# Patient Record
Sex: FEMALE | Race: WHITE | NOT HISPANIC OR LATINO | Employment: UNEMPLOYED | URBAN - METROPOLITAN AREA
[De-identification: names, ages, dates, MRNs, and addresses within clinical notes are randomized per-mention and may not be internally consistent; named-entity substitution may affect disease eponyms.]

---

## 2021-01-01 ENCOUNTER — APPOINTMENT (EMERGENCY)
Dept: RADIOLOGY | Facility: HOSPITAL | Age: 31
End: 2021-01-01
Payer: MEDICARE

## 2021-01-01 ENCOUNTER — HOSPITAL ENCOUNTER (EMERGENCY)
Facility: HOSPITAL | Age: 31
Discharge: HOME | End: 2021-01-01
Attending: EMERGENCY MEDICINE | Admitting: EMERGENCY MEDICINE
Payer: MEDICARE

## 2021-01-01 VITALS
DIASTOLIC BLOOD PRESSURE: 56 MMHG | RESPIRATION RATE: 28 BRPM | OXYGEN SATURATION: 96 % | TEMPERATURE: 98 F | SYSTOLIC BLOOD PRESSURE: 115 MMHG | WEIGHT: 251.32 LBS | HEART RATE: 83 BPM

## 2021-01-01 DIAGNOSIS — R07.9 CHEST PAIN: Primary | ICD-10-CM

## 2021-01-01 LAB
ALBUMIN SERPL BCP-MCNC: 3.3 G/DL (ref 3.5–5)
ALP SERPL-CCNC: 79 U/L (ref 46–116)
ALT SERPL W P-5'-P-CCNC: 26 U/L (ref 12–78)
ANION GAP SERPL CALCULATED.3IONS-SCNC: 12 MMOL/L (ref 4–13)
AST SERPL W P-5'-P-CCNC: 14 U/L (ref 5–45)
BASOPHILS # BLD AUTO: 0.03 THOUSANDS/ΜL (ref 0–0.1)
BASOPHILS NFR BLD AUTO: 0 % (ref 0–1)
BILIRUB SERPL-MCNC: 0.2 MG/DL (ref 0.2–1)
BUN SERPL-MCNC: 17 MG/DL (ref 5–25)
CALCIUM ALBUM COR SERPL-MCNC: 8.9 MG/DL (ref 8.3–10.1)
CALCIUM SERPL-MCNC: 8.3 MG/DL (ref 8.3–10.1)
CHLORIDE SERPL-SCNC: 107 MMOL/L (ref 100–108)
CO2 SERPL-SCNC: 21 MMOL/L (ref 21–32)
CREAT SERPL-MCNC: 0.82 MG/DL (ref 0.6–1.3)
EOSINOPHIL # BLD AUTO: 0.27 THOUSAND/ΜL (ref 0–0.61)
EOSINOPHIL NFR BLD AUTO: 3 % (ref 0–6)
ERYTHROCYTE [DISTWIDTH] IN BLOOD BY AUTOMATED COUNT: 13.3 % (ref 11.6–15.1)
GFR SERPL CREATININE-BSD FRML MDRD: 96 ML/MIN/1.73SQ M
GLUCOSE SERPL-MCNC: 168 MG/DL (ref 65–140)
HCT VFR BLD AUTO: 42.1 % (ref 34.8–46.1)
HGB BLD-MCNC: 13.2 G/DL (ref 11.5–15.4)
IMM GRANULOCYTES # BLD AUTO: 0.05 THOUSAND/UL (ref 0–0.2)
IMM GRANULOCYTES NFR BLD AUTO: 1 % (ref 0–2)
LYMPHOCYTES # BLD AUTO: 3.04 THOUSANDS/ΜL (ref 0.6–4.47)
LYMPHOCYTES NFR BLD AUTO: 37 % (ref 14–44)
MCH RBC QN AUTO: 29.2 PG (ref 26.8–34.3)
MCHC RBC AUTO-ENTMCNC: 31.4 G/DL (ref 31.4–37.4)
MCV RBC AUTO: 93 FL (ref 82–98)
MONOCYTES # BLD AUTO: 0.78 THOUSAND/ΜL (ref 0.17–1.22)
MONOCYTES NFR BLD AUTO: 9 % (ref 4–12)
NEUTROPHILS # BLD AUTO: 4.1 THOUSANDS/ΜL (ref 1.85–7.62)
NEUTS SEG NFR BLD AUTO: 50 % (ref 43–75)
NRBC BLD AUTO-RTO: 0 /100 WBCS
PLATELET # BLD AUTO: 233 THOUSANDS/UL (ref 149–390)
PMV BLD AUTO: 9.2 FL (ref 8.9–12.7)
POTASSIUM SERPL-SCNC: 3.9 MMOL/L (ref 3.5–5.3)
PROT SERPL-MCNC: 6.7 G/DL (ref 6.4–8.2)
RBC # BLD AUTO: 4.52 MILLION/UL (ref 3.81–5.12)
SODIUM SERPL-SCNC: 140 MMOL/L (ref 136–145)
TROPONIN I SERPL-MCNC: <0.02 NG/ML
TROPONIN I SERPL-MCNC: <0.02 NG/ML
WBC # BLD AUTO: 8.27 THOUSAND/UL (ref 4.31–10.16)

## 2021-01-01 PROCEDURE — 96374 THER/PROPH/DIAG INJ IV PUSH: CPT

## 2021-01-01 PROCEDURE — 99285 EMERGENCY DEPT VISIT HI MDM: CPT | Performed by: EMERGENCY MEDICINE

## 2021-01-01 PROCEDURE — 36415 COLL VENOUS BLD VENIPUNCTURE: CPT | Performed by: EMERGENCY MEDICINE

## 2021-01-01 PROCEDURE — 80053 COMPREHEN METABOLIC PANEL: CPT | Performed by: EMERGENCY MEDICINE

## 2021-01-01 PROCEDURE — 93005 ELECTROCARDIOGRAM TRACING: CPT

## 2021-01-01 PROCEDURE — 99285 EMERGENCY DEPT VISIT HI MDM: CPT

## 2021-01-01 PROCEDURE — 71046 X-RAY EXAM CHEST 2 VIEWS: CPT

## 2021-01-01 PROCEDURE — 85025 COMPLETE CBC W/AUTO DIFF WBC: CPT | Performed by: EMERGENCY MEDICINE

## 2021-01-01 PROCEDURE — 84484 ASSAY OF TROPONIN QUANT: CPT | Performed by: EMERGENCY MEDICINE

## 2021-01-01 RX ORDER — LORAZEPAM 0.5 MG/1
0.5 TABLET ORAL ONCE
Status: COMPLETED | OUTPATIENT
Start: 2021-01-01 | End: 2021-01-01

## 2021-01-01 RX ORDER — KETOROLAC TROMETHAMINE 30 MG/ML
15 INJECTION, SOLUTION INTRAMUSCULAR; INTRAVENOUS ONCE
Status: COMPLETED | OUTPATIENT
Start: 2021-01-01 | End: 2021-01-01

## 2021-01-01 RX ADMIN — KETOROLAC TROMETHAMINE 15 MG: 30 INJECTION, SOLUTION INTRAMUSCULAR at 19:55

## 2021-01-01 RX ADMIN — LORAZEPAM 0.5 MG: 0.5 TABLET ORAL at 20:48

## 2021-01-02 LAB
ATRIAL RATE: 84 BPM
ATRIAL RATE: 85 BPM
ATRIAL RATE: 90 BPM
P AXIS: 33 DEGREES
P AXIS: 49 DEGREES
P AXIS: 53 DEGREES
PR INTERVAL: 156 MS
PR INTERVAL: 158 MS
PR INTERVAL: 160 MS
QRS AXIS: 61 DEGREES
QRS AXIS: 69 DEGREES
QRS AXIS: 71 DEGREES
QRSD INTERVAL: 88 MS
QT INTERVAL: 344 MS
QT INTERVAL: 370 MS
QT INTERVAL: 372 MS
QTC INTERVAL: 420 MS
QTC INTERVAL: 437 MS
QTC INTERVAL: 442 MS
T WAVE AXIS: 60 DEGREES
T WAVE AXIS: 66 DEGREES
T WAVE AXIS: 68 DEGREES
VENTRICULAR RATE: 84 BPM
VENTRICULAR RATE: 85 BPM
VENTRICULAR RATE: 90 BPM

## 2021-01-02 PROCEDURE — 93010 ELECTROCARDIOGRAM REPORT: CPT | Performed by: INTERNAL MEDICINE

## 2021-01-02 NOTE — ED NOTES
Patient's visitor at nurse's station asking "when can Lawerence Lesch go home, Lawerence Lesch is feeling better her chest isn't as tight and she can breath better"  Dr Nikky Abbott overheard and explained the plan of treatment       John Garza  01/01/21 5695

## 2021-01-02 NOTE — ED PROVIDER NOTES
History  Chief Complaint   Patient presents with    Chest Pain     [facility] " Pt is c/o CP x 1 day  Received 324 ASA but provided no relief "      27 y o  F presents to the ED w CP  Patient states the pain started today - she states that she was napping when her cat when she developed chest pain, this woke her up from sleep  Patient had similar pains 1 time in the past, 1 month ago  This was attributed to anxiety and she was started on Zyprexa by her primary care provider  Patient continues to take her Zyprexa  Today she had a repeat episode of the chest pain which caused her to come to the emergency department  Patient is unable to describe the chest pain  Denies SOB  Denies cough  No F/C  No recent illness or known exposure  No nausea or vomiting  None       Past Medical History:   Diagnosis Date    Anxiety     [facility] [address] )     Epilepsy ([facility] )     Psychiatric disorder        Past Surgical History:   Procedure Laterality Date    TONSILLECTOMY         History reviewed  No pertinent family history  I have reviewed and agree with the history as documented  E-Cigarette/Vaping    E-Cigarette Use Never User      E-Cigarette/Vaping Substances    Nicotine Yes      Social History     Tobacco Use    Smoking status: Former Smoker     Quit date: 2020     Years since quittin 0    Smokeless tobacco: Never Used   Substance Use Topics    Alcohol use: Never     Frequency: Never    Drug use: Never       Review of Systems   Constitutional: Negative for chills, fatigue and fever  HENT: Negative for congestion and rhinorrhea  Respiratory: Negative for apnea, cough, chest tightness and shortness of breath  Cardiovascular: Positive for chest pain  Negative for palpitations and leg swelling  Gastrointestinal: Negative for abdominal pain, constipation, diarrhea, nausea and vomiting  Genitourinary: Negative for dysuria and flank pain     Musculoskeletal: Negative for back pain and neck pain  Skin: Negative for color change, pallor and rash  Allergic/Immunologic: Negative for immunocompromised state  Neurological: Negative for dizziness, syncope, weakness, light-headedness, numbness and headaches  Physical Exam  Physical Exam  Vitals signs reviewed  Constitutional:       General: She is not in acute distress  Appearance: She is well-developed  She is not ill-appearing, toxic-appearing or diaphoretic  HENT:      Head: Normocephalic and atraumatic  Eyes:      General: No scleral icterus  Right eye: No discharge  Left eye: No discharge  Conjunctiva/sclera: Conjunctivae normal       Pupils: Pupils are equal, round, and reactive to light  Neck:      Musculoskeletal: Normal range of motion and neck supple  Vascular: No JVD  Cardiovascular:      Rate and Rhythm: Normal rate and regular rhythm  Heart sounds: Normal heart sounds  No murmur  No friction rub  No gallop  Comments: No reproducible CP  Pulmonary:      Effort: Pulmonary effort is normal  No respiratory distress  Breath sounds: Normal breath sounds  No decreased breath sounds, wheezing, rhonchi or rales  Chest:      Chest wall: No tenderness  Abdominal:      General: Bowel sounds are normal  There is no distension  Palpations: Abdomen is soft  Tenderness: There is no abdominal tenderness  There is no guarding or rebound  Musculoskeletal: Normal range of motion  General: No tenderness or deformity  Right lower leg: She exhibits no tenderness  No edema  Left lower leg: She exhibits no tenderness  No edema  Skin:     General: Skin is warm and dry  Coloration: Skin is not pale  Findings: No erythema or rash  Neurological:      Mental Status: She is alert and oriented to person, place, and time  Cranial Nerves: No cranial nerve deficit  Psychiatric:         Mood and Affect: Mood is anxious           Behavior: Behavior normal          Vital Signs  ED Triage Vitals   Temperature Pulse Respirations Blood Pressure SpO2   01/01/21 2145 01/01/21 1922 01/01/21 1922 01/01/21 1922 01/01/21 1922   98 °F (36 7 °C) 88 20 124/77 99 %      Temp src Heart Rate Source Patient Position - Orthostatic VS BP Location FiO2 (%)   -- 01/01/21 2015 01/01/21 2015 -- --    Monitor Sitting        Pain Score       01/01/21 1922       8           Vitals:    01/01/21 2015 01/01/21 2030 01/01/21 2145 01/01/21 2230   BP: 135/67 126/59 121/60 115/56   Pulse: 85 82 84 83   Patient Position - Orthostatic VS: Sitting Sitting           Visual Acuity      ED Medications  Medications   ketorolac (TORADOL) injection 15 mg (15 mg Intravenous Given 1/1/21 1955)   LORazepam (ATIVAN) tablet 0 5 mg (0 5 mg Oral Given 1/1/21 2048)       Diagnostic Studies  Results Reviewed     Procedure Component Value Units Date/Time    Troponin I [[id number]]  (Normal) Collected: 01/01/21 2241    Lab Status: Final result Specimen: Blood from Arm, Right Updated: 01/01/21 2305     Troponin I <0 02 ng/mL     Troponin I [[id number]]  (Normal) Collected: 01/01/21 1949    Lab Status: Final result Specimen: Blood from Arm, Right Updated: 01/01/21 2017     Troponin I <0 02 ng/mL     Comprehensive metabolic panel [[id number]]  (Abnormal) Collected: 01/01/21 1949    Lab Status: Final result Specimen: Blood from Arm, Right Updated: 01/01/21 2015     Sodium 140 mmol/L      Potassium 3 9 mmol/L      Chloride 107 mmol/L      CO2 21 mmol/L      ANION GAP 12 mmol/L      BUN 17 mg/dL      Creatinine 0 82 mg/dL      Glucose 168 mg/dL      Calcium 8 3 mg/dL      Corrected Calcium 8 9 mg/dL      AST 14 U/L      ALT 26 U/L      Alkaline Phosphatase 79 U/L      Total Protein 6 7 g/dL      Albumin 3 3 g/dL      Total Bilirubin 0 20 mg/dL      eGFR 96 ml/min/1 73sq m     Narrative:      [place] guidelines for Chronic Kidney Disease (CKD):     Stage 1 with normal or high GFR (GFR > 90 mL/min/1 73 square meters)    Stage 2 Mild CKD (GFR = 60-89 mL/min/1 73 square meters)    Stage 3A Moderate CKD (GFR = 45-59 mL/min/1 73 square meters)    Stage 3B Moderate CKD (GFR = 30-44 mL/min/1 73 square meters)    Stage 4 Severe CKD (GFR = 15-29 mL/min/1 73 square meters)    Stage 5 End Stage CKD (GFR <15 mL/min/1 73 square meters)  Note: GFR calculation is accurate only with a steady state creatinine    CBC and differential [[id number]] Collected: 01/01/21 1949    Lab Status: Final result Specimen: Blood from Arm, Right Updated: 01/01/21 2000     WBC 8 27 Thousand/uL      RBC 4 52 Million/uL      Hemoglobin 13 2 g/dL      Hematocrit 42 1 %      MCV 93 fL      MCH 29 2 pg      MCHC 31 4 g/dL      RDW 13 3 %      MPV 9 2 fL      Platelets 022 Thousands/uL      nRBC 0 /100 WBCs      Neutrophils Relative 50 %      Immat GRANS % 1 %      Lymphocytes Relative 37 %      Monocytes Relative 9 %      Eosinophils Relative 3 %      Basophils Relative 0 %      Neutrophils Absolute 4 10 Thousands/µL      Immature Grans Absolute 0 05 Thousand/uL      Lymphocytes Absolute 3 04 Thousands/µL      Monocytes Absolute 0 78 Thousand/µL      Eosinophils Absolute 0 27 Thousand/µL      Basophils Absolute 0 03 Thousands/µL                  XR chest 2 views   ED Interpretation by Jovita Liriano DO (01/01 2048)   No acute cardiopulmonary abnormalities      Final Result by Gia Carrizales MD (01/02 0578)      No acute cardiopulmonary disease                    Workstation performed: [id number]                    Procedures  ECG 12 Lead Documentation Only    Date/Time: 1/1/2021 8:30 PM  Performed by: Jovita Liriano DO  Authorized by: Jovita Liriano DO     Indications / Diagnosis:  CP  ECG reviewed by me, the ED Provider: yes    Patient location:  ED  Previous ECG:     Previous ECG:  Compared to current    Comparison to cardiac monitor: Yes    Interpretation:     Interpretation: normal    Rate:     ECG rate: 85    ECG rate assessment: normal    Rhythm:     Rhythm: sinus rhythm    Ectopy:     Ectopy: none    QRS:     QRS axis:  Normal    QRS intervals:  Normal  Conduction:     Conduction: normal    ST segments:     ST segments:  Normal  T waves:     T waves: normal               ED Course  ED Course as of Jan 03 2149 Fri Jan 01, 2021 2155 Patient resting comfortably  Agreeable to be monitored for delta trop/ekg  HEART Risk Score      Most Recent Value   Heart Score Risk Calculator   History  0 Filed at: 01/01/2021 2040   ECG  1 Filed at: 01/01/2021 2040   Age  0 Filed at: 01/01/2021 2040   Risk Factors  1 Filed at: 01/01/2021 2040   Troponin  0 Filed at: 01/01/2021 2040   HEART Score  2 Filed at: 01/01/2021 2040                      SBIRT 20yo+      Most Recent Value   SBIRT (23 yo +)   In order to provide better care to our patients, we are screening all of our patients for alcohol and drug use  Would it be okay to ask you these screening questions? No Filed at: 01/01/2021 2156                    MDM  Number of Diagnoses or Management Options  Chest pain:   Diagnosis management comments: CP  Likely anxiety  Will do cardiac work up with delta trop/ekg  Will give ativan  Patient is improved in prior time 2nd troponin  Is agreeable to stay troponin and EKG which were discharged on return precautions advised to follow-up with primary care provider for further care regarding and for further workup as needed  discharged in stable condition         Amount and/or Complexity of Data Reviewed  Clinical lab tests: ordered and reviewed  Tests in the radiology section of CPT®: ordered and reviewed        Disposition  Final diagnoses:   Chest pain     Time reflects when diagnosis was documented in both MDM as applicable and the Disposition within this note     Time User Action Codes Description Comment    1/1/2021  9:14 PM Yamilex Garsia Add [R07 9] Chest pain       ED Disposition     ED Disposition Condition Date/Time Comment    Discharge Stable Fri Jan 1, 2021 10:23 PM Kim Montes discharge to home/self care  Follow-up Information     Follow up With Specialties Details Why Contact Info Additional Information    [address] [facility] Emergency Department Emergency Medicine  If symptoms worsen: worsening chest pain, trouble breathing, fever/chills, etc Jen Ribera [address] ED, [address] [place] [address], [id number]    [employer]   call to find a PCP if you do not have one [phone]             There are no discharge medications for this patient  No discharge procedures on file      PDMP Review     None          ED Provider  Electronically Signed by           Finn Villafuerte DO  01/03/21 4334

## 2021-01-19 ENCOUNTER — APPOINTMENT (EMERGENCY)
Dept: RADIOLOGY | Facility: HOSPITAL | Age: 31
DRG: 624 | End: 2021-01-19
Payer: MEDICARE

## 2021-01-19 ENCOUNTER — HOSPITAL ENCOUNTER (INPATIENT)
Facility: HOSPITAL | Age: 31
LOS: 3 days | Discharge: HOME | DRG: 624 | End: 2021-01-22
Attending: EMERGENCY MEDICINE | Admitting: INTERNAL MEDICINE
Payer: MEDICARE

## 2021-01-19 DIAGNOSIS — L97.509 DIABETIC FOOT ULCER (HCC): Primary | ICD-10-CM

## 2021-01-19 DIAGNOSIS — E11.621 DIABETIC FOOT ULCER (HCC): Primary | ICD-10-CM

## 2021-01-19 PROBLEM — E11.628 DIABETIC FOOT INFECTION (HCC): Status: ACTIVE | Noted: 2021-01-19

## 2021-01-19 PROBLEM — L08.9 DIABETIC FOOT INFECTION (HCC): Status: ACTIVE | Noted: 2021-01-19

## 2021-01-19 PROBLEM — G40.909 SEIZURE DISORDER (HCC): Status: ACTIVE | Noted: 2021-01-19

## 2021-01-19 PROBLEM — E78.5 HYPERLIPIDEMIA: Status: ACTIVE | Noted: 2021-01-19

## 2021-01-19 PROBLEM — E11.9 DIABETES (HCC): Status: ACTIVE | Noted: 2021-01-19

## 2021-01-19 LAB
ALBUMIN SERPL BCP-MCNC: 3.7 G/DL (ref 3.5–5)
ALP SERPL-CCNC: 118 U/L (ref 46–116)
ALT SERPL W P-5'-P-CCNC: 23 U/L (ref 12–78)
ANION GAP SERPL CALCULATED.3IONS-SCNC: 11 MMOL/L (ref 4–13)
AST SERPL W P-5'-P-CCNC: 14 U/L (ref 5–45)
BASOPHILS # BLD AUTO: 0.06 THOUSANDS/ΜL (ref 0–0.1)
BASOPHILS NFR BLD AUTO: 1 % (ref 0–1)
BILIRUB SERPL-MCNC: 0.4 MG/DL (ref 0.2–1)
BUN SERPL-MCNC: 15 MG/DL (ref 5–25)
CALCIUM SERPL-MCNC: 9.1 MG/DL (ref 8.3–10.1)
CHLORIDE SERPL-SCNC: 107 MMOL/L (ref 100–108)
CO2 SERPL-SCNC: 26 MMOL/L (ref 21–32)
CREAT SERPL-MCNC: 0.86 MG/DL (ref 0.6–1.3)
EOSINOPHIL # BLD AUTO: 0.23 THOUSAND/ΜL (ref 0–0.61)
EOSINOPHIL NFR BLD AUTO: 2 % (ref 0–6)
ERYTHROCYTE [DISTWIDTH] IN BLOOD BY AUTOMATED COUNT: 12.8 % (ref 11.6–15.1)
GFR SERPL CREATININE-BSD FRML MDRD: 91 ML/MIN/1.73SQ M
GLUCOSE SERPL-MCNC: 102 MG/DL (ref 65–140)
GLUCOSE SERPL-MCNC: 107 MG/DL (ref 65–140)
GLUCOSE SERPL-MCNC: 120 MG/DL (ref 65–140)
HCT VFR BLD AUTO: 46 % (ref 34.8–46.1)
HGB BLD-MCNC: 14.3 G/DL (ref 11.5–15.4)
IMM GRANULOCYTES # BLD AUTO: 0.03 THOUSAND/UL (ref 0–0.2)
IMM GRANULOCYTES NFR BLD AUTO: 0 % (ref 0–2)
LACTATE SERPL-SCNC: 2 MMOL/L (ref 0.5–2)
LACTATE SERPL-SCNC: 2.4 MMOL/L (ref 0.5–2)
LYMPHOCYTES # BLD AUTO: 3.24 THOUSANDS/ΜL (ref 0.6–4.47)
LYMPHOCYTES NFR BLD AUTO: 31 % (ref 14–44)
MCH RBC QN AUTO: 28.9 PG (ref 26.8–34.3)
MCHC RBC AUTO-ENTMCNC: 31.1 G/DL (ref 31.4–37.4)
MCV RBC AUTO: 93 FL (ref 82–98)
MONOCYTES # BLD AUTO: 0.74 THOUSAND/ΜL (ref 0.17–1.22)
MONOCYTES NFR BLD AUTO: 7 % (ref 4–12)
NEUTROPHILS # BLD AUTO: 6.18 THOUSANDS/ΜL (ref 1.85–7.62)
NEUTS SEG NFR BLD AUTO: 59 % (ref 43–75)
NRBC BLD AUTO-RTO: 0 /100 WBCS
PLATELET # BLD AUTO: 305 THOUSANDS/UL (ref 149–390)
PMV BLD AUTO: 9.1 FL (ref 8.9–12.7)
POTASSIUM SERPL-SCNC: 3.7 MMOL/L (ref 3.5–5.3)
PROT SERPL-MCNC: 7.8 G/DL (ref 6.4–8.2)
RBC # BLD AUTO: 4.94 MILLION/UL (ref 3.81–5.12)
SODIUM SERPL-SCNC: 144 MMOL/L (ref 136–145)
WBC # BLD AUTO: 10.48 THOUSAND/UL (ref 4.31–10.16)

## 2021-01-19 PROCEDURE — 82948 REAGENT STRIP/BLOOD GLUCOSE: CPT

## 2021-01-19 PROCEDURE — 99285 EMERGENCY DEPT VISIT HI MDM: CPT | Performed by: EMERGENCY MEDICINE

## 2021-01-19 PROCEDURE — 99285 EMERGENCY DEPT VISIT HI MDM: CPT

## 2021-01-19 PROCEDURE — 96365 THER/PROPH/DIAG IV INF INIT: CPT

## 2021-01-19 PROCEDURE — 80053 COMPREHEN METABOLIC PANEL: CPT | Performed by: EMERGENCY MEDICINE

## 2021-01-19 PROCEDURE — 87040 BLOOD CULTURE FOR BACTERIA: CPT | Performed by: EMERGENCY MEDICINE

## 2021-01-19 PROCEDURE — 85025 COMPLETE CBC W/AUTO DIFF WBC: CPT | Performed by: EMERGENCY MEDICINE

## 2021-01-19 PROCEDURE — 73630 X-RAY EXAM OF FOOT: CPT

## 2021-01-19 PROCEDURE — 36415 COLL VENOUS BLD VENIPUNCTURE: CPT | Performed by: EMERGENCY MEDICINE

## 2021-01-19 PROCEDURE — 83605 ASSAY OF LACTIC ACID: CPT | Performed by: EMERGENCY MEDICINE

## 2021-01-19 PROCEDURE — 99223 1ST HOSP IP/OBS HIGH 75: CPT | Performed by: INTERNAL MEDICINE

## 2021-01-19 PROCEDURE — 83036 HEMOGLOBIN GLYCOSYLATED A1C: CPT | Performed by: INTERNAL MEDICINE

## 2021-01-19 RX ORDER — SODIUM CHLORIDE, SODIUM GLUCONATE, SODIUM ACETATE, POTASSIUM CHLORIDE, MAGNESIUM CHLORIDE, SODIUM PHOSPHATE, DIBASIC, AND POTASSIUM PHOSPHATE .53; .5; .37; .037; .03; .012; .00082 G/100ML; G/100ML; G/100ML; G/100ML; G/100ML; G/100ML; G/100ML
1000 INJECTION, SOLUTION INTRAVENOUS ONCE
Status: COMPLETED | OUTPATIENT
Start: 2021-01-19 | End: 2021-01-19

## 2021-01-19 RX ORDER — OLANZAPINE 2.5 MG/1
5 TABLET ORAL
Status: DISCONTINUED | OUTPATIENT
Start: 2021-01-19 | End: 2021-01-22 | Stop reason: HOSPADM

## 2021-01-19 RX ORDER — GABAPENTIN 300 MG/1
600 CAPSULE ORAL 3 TIMES DAILY
COMMUNITY

## 2021-01-19 RX ORDER — ATORVASTATIN CALCIUM 20 MG/1
20 TABLET, FILM COATED ORAL
COMMUNITY

## 2021-01-19 RX ORDER — LAMOTRIGINE 100 MG/1
100 TABLET ORAL DAILY
Status: ON HOLD | COMMUNITY
End: 2021-01-19

## 2021-01-19 RX ORDER — ZIPRASIDONE HYDROCHLORIDE 60 MG/1
60 CAPSULE ORAL
COMMUNITY
End: 2021-07-03 | Stop reason: HOSPADM

## 2021-01-19 RX ORDER — TOPIRAMATE 100 MG/1
100 TABLET, FILM COATED ORAL
COMMUNITY

## 2021-01-19 RX ORDER — GLIPIZIDE 5 MG/1
5 TABLET, FILM COATED, EXTENDED RELEASE ORAL DAILY
COMMUNITY

## 2021-01-19 RX ORDER — ATORVASTATIN CALCIUM 20 MG/1
20 TABLET, FILM COATED ORAL
Status: DISCONTINUED | OUTPATIENT
Start: 2021-01-19 | End: 2021-01-22 | Stop reason: HOSPADM

## 2021-01-19 RX ORDER — ACETAMINOPHEN 325 MG/1
650 TABLET ORAL EVERY 6 HOURS PRN
Status: DISCONTINUED | OUTPATIENT
Start: 2021-01-19 | End: 2021-01-22 | Stop reason: HOSPADM

## 2021-01-19 RX ORDER — ZIPRASIDONE HYDROCHLORIDE 20 MG/1
60 CAPSULE ORAL 2 TIMES DAILY WITH MEALS
Status: DISCONTINUED | OUTPATIENT
Start: 2021-01-20 | End: 2021-01-22 | Stop reason: HOSPADM

## 2021-01-19 RX ORDER — SODIUM CHLORIDE 9 MG/ML
125 INJECTION, SOLUTION INTRAVENOUS CONTINUOUS
Status: DISCONTINUED | OUTPATIENT
Start: 2021-01-19 | End: 2021-01-22 | Stop reason: HOSPADM

## 2021-01-19 RX ORDER — ZIPRASIDONE HYDROCHLORIDE 60 MG/1
60 CAPSULE ORAL 2 TIMES DAILY WITH MEALS
Status: ON HOLD | COMMUNITY
End: 2021-01-19

## 2021-01-19 RX ORDER — CEFAZOLIN SODIUM 2 G/50ML
2000 SOLUTION INTRAVENOUS EVERY 8 HOURS
Status: DISCONTINUED | OUTPATIENT
Start: 2021-01-20 | End: 2021-01-22 | Stop reason: HOSPADM

## 2021-01-19 RX ORDER — GABAPENTIN 300 MG/1
300 CAPSULE ORAL 3 TIMES DAILY
Status: ON HOLD | COMMUNITY
End: 2021-01-19

## 2021-01-19 RX ORDER — ATORVASTATIN CALCIUM 20 MG/1
20 TABLET, FILM COATED ORAL DAILY
Status: ON HOLD | COMMUNITY
End: 2021-01-19

## 2021-01-19 RX ORDER — METRONIDAZOLE 500 MG/1
500 TABLET ORAL EVERY 8 HOURS SCHEDULED
Status: DISCONTINUED | OUTPATIENT
Start: 2021-01-19 | End: 2021-01-22 | Stop reason: HOSPADM

## 2021-01-19 RX ORDER — LAMOTRIGINE 100 MG/1
100 TABLET ORAL
COMMUNITY

## 2021-01-19 RX ORDER — TOPIRAMATE 100 MG/1
100 TABLET, FILM COATED ORAL 2 TIMES DAILY
Status: DISCONTINUED | OUTPATIENT
Start: 2021-01-19 | End: 2021-01-22 | Stop reason: HOSPADM

## 2021-01-19 RX ORDER — GABAPENTIN 300 MG/1
300 CAPSULE ORAL 3 TIMES DAILY
Status: DISCONTINUED | OUTPATIENT
Start: 2021-01-19 | End: 2021-01-22 | Stop reason: HOSPADM

## 2021-01-19 RX ORDER — NAPROXEN 500 MG/1
500 TABLET ORAL 2 TIMES DAILY WITH MEALS
COMMUNITY
End: 2021-07-03 | Stop reason: HOSPADM

## 2021-01-19 RX ADMIN — SODIUM CHLORIDE, SODIUM GLUCONATE, SODIUM ACETATE, POTASSIUM CHLORIDE, MAGNESIUM CHLORIDE, SODIUM PHOSPHATE, DIBASIC, AND POTASSIUM PHOSPHATE 1000 ML: .53; .5; .37; .037; .03; .012; .00082 INJECTION, SOLUTION INTRAVENOUS at 18:53

## 2021-01-19 RX ADMIN — OLANZAPINE 5 MG: 2.5 TABLET, FILM COATED ORAL at 22:19

## 2021-01-19 RX ADMIN — SODIUM CHLORIDE 125 ML/HR: 0.9 INJECTION, SOLUTION INTRAVENOUS at 20:49

## 2021-01-19 RX ADMIN — GABAPENTIN 300 MG: 300 CAPSULE ORAL at 20:50

## 2021-01-19 RX ADMIN — VANCOMYCIN HYDROCHLORIDE 1750 MG: 1 INJECTION, POWDER, LYOPHILIZED, FOR SOLUTION INTRAVENOUS at 18:54

## 2021-01-19 RX ADMIN — ATORVASTATIN CALCIUM 20 MG: 20 TABLET, FILM COATED ORAL at 20:50

## 2021-01-19 RX ADMIN — TOPIRAMATE 100 MG: 100 TABLET ORAL at 20:50

## 2021-01-19 RX ADMIN — CEFEPIME HYDROCHLORIDE 2000 MG: 2 INJECTION, POWDER, FOR SOLUTION INTRAVENOUS at 17:55

## 2021-01-19 RX ADMIN — METRONIDAZOLE 500 MG: 500 TABLET ORAL at 22:19

## 2021-01-19 NOTE — ASSESSMENT & PLAN NOTE
No results found for: HGBA1C    Recent Labs     01/19/21  1721   POCGLU 102       Blood Sugar Average: Last 72 hrs:  (P) 102     Hold home p o   Diabetic agents  No previous records patient is new to the area  Will check A1c  Sliding scale insulin

## 2021-01-19 NOTE — H&P
H&P- Mark Barillas 1990, 27 y o  female MRN: [id number]    Unit/Bed#: -01 Encounter: [id number]    Primary Care Provider: No primary care provider on file  Date and time admitted to hospital: 1/19/2021  5:17 PM        Seizure disorder [facility])  Assessment & Plan  Reports noncompliance with Topamax and Geodon  But takes Lamictal, reports no seizures in quite sometime  Will continue with Lamictal for now will need outpatient f/u    Diabetes [facility])  Assessment & Plan  No results found for: HGBA1C    Recent Labs     01/19/21  1721   POCGLU 102       Blood Sugar Average: Last 72 hrs:  (P) 102     Hold home p o  Diabetic agents  No previous records patient is new to the area  Will check A1c  Sliding scale insulin    Hyperlipidemia  Assessment & Plan  Continue home statin    VTE Prophylaxis: Pharmacologic VTE Prophylaxis contraindicated due to low risk  / sequential compression device   Code Status: full code  POLST: There is no POLST form on file for this patient (pre-hospital)  Discussion with family: pt    Anticipated Length of Stay:  Patient will be admitted on an Inpatient basis with an anticipated length of stay of  > 2 midnights  Justification for Hospital Stay:  Diabetic foot infection    Total Time for Visit, including Counseling / Coordination of Care: 1 hour  Greater than 50% of this total time spent on direct patient counseling and coordination of care  Chief Complaint:   Foot ulcer    History of Present Illness:    Mark Barillas is a 27 y o  female with past medical history significant seizure disorder, type 2 diabetes initially presented with bilateral foot ulcers  She notes over the past 1-2 weeks he has had worsening drainage redness or warmth  She denies any fevers, chills, abdominal pain, chest pain, shortness breath, cough, diarrhea, constipation, nausea, vomiting, numbness, weakness or any other symptoms    She notes she is new to the area and does not currently have a PCP     Review of Systems:    Review of Systems   Constitutional: Negative for activity change, appetite change, chills, diaphoresis, fever and unexpected weight change  HENT: Negative for congestion, facial swelling and rhinorrhea  Eyes: Negative for photophobia and visual disturbance  Respiratory: Negative for cough, shortness of breath and wheezing  Cardiovascular: Negative for chest pain and palpitations  Gastrointestinal: Negative for abdominal pain, blood in stool, constipation, diarrhea, nausea and vomiting  Genitourinary: Negative for decreased urine volume, difficulty urinating, dysuria, flank pain, frequency, hematuria and urgency  Musculoskeletal: Negative for arthralgias, back pain, joint swelling and myalgias  Neurological: Negative for dizziness, syncope, facial asymmetry, light-headedness, numbness and headaches  Psychiatric/Behavioral: Negative for confusion and decreased concentration  The patient is not nervous/anxious  Past Medical and Surgical History:     Past Medical History:   Diagnosis Date    Anxiety     Diabetes mellitus ([facility] )     Epilepsy ([facility] )     Psychiatric disorder        Past Surgical History:   Procedure Laterality Date    TONSILLECTOMY         Meds/Allergies:    Prior to Admission medications    Not on File     I have reviewed home medications with patient personally  Allergies:    Allergies   Allergen Reactions    Amoxicillin Itching    Macrobid [Nitrofurantoin] Itching    Vancomycin Rash       Social History:     Marital Status: Single     Patient Pre-hospital Living Situation: home  Patient Pre-hospital Level of Mobility: independent  Patient Pre-hospital Diet Restrictions: diabetic  Substance Use History:   Social History     Substance and Sexual Activity   Alcohol Use Never    Frequency: Never     Social History     Tobacco Use   Smoking Status Former Smoker    Quit date: 2020    Years since quittin 0   Smokeless Tobacco Never Used     Social History     Substance and Sexual Activity   Drug Use Never       Family History:    History reviewed  No pertinent family history  Physical Exam:     Vitals:   Blood Pressure: 107/65 (01/19/21 1929)  Pulse: 86 (01/19/21 1929)  Temperature: 98 6 °F (37 °C) (01/19/21 1718)  Temp Source: Oral (01/19/21 1718)  Respirations: 18 (01/19/21 1929)  SpO2: 92 % (01/19/21 1929)    Physical Exam  Constitutional:       General: She is not in acute distress  Appearance: She is well-developed  She is not diaphoretic  HENT:      Head: Normocephalic and atraumatic  Nose: Nose normal       Mouth/Throat:      Pharynx: No oropharyngeal exudate  Eyes:      General: No scleral icterus  Right eye: No discharge  Left eye: No discharge  Conjunctiva/sclera: Conjunctivae normal       Pupils: Pupils are equal, round, and reactive to light  Neck:      Musculoskeletal: Normal range of motion and neck supple  Thyroid: No thyromegaly  Vascular: No JVD  Cardiovascular:      Rate and Rhythm: Normal rate and regular rhythm  Heart sounds: Normal heart sounds  No murmur  No friction rub  No gallop  Pulmonary:      Effort: Pulmonary effort is normal  No respiratory distress  Breath sounds: Normal breath sounds  No wheezing or rales  Chest:      Chest wall: No tenderness  Abdominal:      General: Bowel sounds are normal  There is no distension  Palpations: Abdomen is soft  Tenderness: There is no abdominal tenderness  There is no guarding or rebound  Musculoskeletal: Normal range of motion  General: No tenderness or deformity  Skin:     General: Skin is warm and dry  Findings: Erythema and lesion present  No rash  Neurological:      Mental Status: She is alert and oriented to person, place, and time  Cranial Nerves: No cranial nerve deficit  Sensory: No sensory deficit  Motor: No abnormal muscle tone        Coordination: Coordination normal          Additional Data:     Lab Results: I have personally reviewed pertinent reports  Results from last 7 days   Lab Units 01/19/21  1750   WBC Thousand/uL 10 48*   HEMOGLOBIN g/dL 14 3   HEMATOCRIT % 46 0   PLATELETS Thousands/uL 305   NEUTROS PCT % 59   LYMPHS PCT % 31   MONOS PCT % 7   EOS PCT % 2     Results from last 7 days   Lab Units 01/19/21  1750   SODIUM mmol/L 144   POTASSIUM mmol/L 3 7   CHLORIDE mmol/L 107   CO2 mmol/L 26   BUN mg/dL 15   CREATININE mg/dL 0 86   ANION GAP mmol/L 11   CALCIUM mg/dL 9 1   ALBUMIN g/dL 3 7   TOTAL BILIRUBIN mg/dL 0 40   ALK PHOS U/L 118*   ALT U/L 23   AST U/L 14   GLUCOSE RANDOM mg/dL 120         Results from last 7 days   Lab Units 01/19/21  1721   POC GLUCOSE mg/dl 102         Results from last 7 days   Lab Units 01/19/21  1750   LACTIC ACID mmol/L 2 4*       Imaging: I have personally reviewed pertinent reports  XR foot 3+ views RIGHT    (Results Pending)   XR foot 3+ views LEFT    (Results Pending)       EKG, Pathology, and Other Studies Reviewed on Admission:   · EKG: reviewed    [facility] Epic Records Reviewed: Yes     ** Please Note: This note has been constructed using a voice recognition system   **

## 2021-01-19 NOTE — ASSESSMENT & PLAN NOTE
No results found for: HGBA1C    Recent Labs     01/19/21  1721   POCGLU 102       Blood Sugar Average: Last 72 hrs:  (P) 102     Presented with purulent drainage and ulceration of both of her great toes with associated redness warmth for the past week  Currently afebrile  Mild leukocytosis 10 5, lactic acid 2 4  Will provide IV cefazolin and metronidazole  Wound culture  Trend WBC count  Podiatry evaluation  X-ray pending  May need MRI to rule out osteomyelitis  Pain control  Trend WBC count fever curve

## 2021-01-19 NOTE — ED PROVIDER NOTES
History  Chief Complaint   Patient presents with    Foot Ulcer     b/l foot ulcers, purlent drainage  hx T2DM     HPI  26 yo F with PMH DM, anxiety, epilepsy presents with bilateral big toe ulcers to the plantar side  She has had them "a couple weeks" but states they are worsening with redness around toes and purulent drainage  No fevers or chills  She states she recently moved to the area and does not follow with a podiatrist or primary care doctor  None       Past Medical History:   Diagnosis Date    Anxiety     Diabetes mellitus ([facility] )     Epilepsy ([facility] )     Psychiatric disorder        Past Surgical History:   Procedure Laterality Date    TONSILLECTOMY         History reviewed  No pertinent family history  I have reviewed and agree with the history as documented  E-Cigarette/Vaping    E-Cigarette Use Never User      E-Cigarette/Vaping Substances    Nicotine Yes      Social History     Tobacco Use    Smoking status: Former Smoker     Quit date: 2020     Years since quittin 0    Smokeless tobacco: Never Used   Substance Use Topics    Alcohol use: Never     Frequency: Never    Drug use: Never       Review of Systems   Constitutional: Negative for chills and fever  HENT: Negative for dental problem and ear pain  Eyes: Negative for pain and redness  Respiratory: Negative for cough and shortness of breath  Cardiovascular: Negative for chest pain and palpitations  Gastrointestinal: Negative for abdominal pain and nausea  Endocrine: Negative for polydipsia and polyphagia  Genitourinary: Negative for dysuria and frequency  Musculoskeletal: Negative for arthralgias and joint swelling         +toe ulcers   Skin: Negative for color change and rash  Neurological: Negative for dizziness and headaches  Psychiatric/Behavioral: Negative for behavioral problems and confusion  All other systems reviewed and are negative        Physical Exam  Physical Exam  Vitals signs and nursing note reviewed  Constitutional:       General: She is not in acute distress  Appearance: She is well-developed  She is not diaphoretic  HENT:      Head: Atraumatic  Right Ear: External ear normal       Left Ear: External ear normal       Nose: Nose normal    Eyes:      Conjunctiva/sclera: Conjunctivae normal       Pupils: Pupils are equal, round, and reactive to light  Neck:      Musculoskeletal: Normal range of motion and neck supple  Vascular: No JVD  Cardiovascular:      Rate and Rhythm: Normal rate and regular rhythm  Heart sounds: Normal heart sounds  No murmur  Pulmonary:      Effort: Pulmonary effort is normal  No respiratory distress  Breath sounds: Normal breath sounds  No wheezing  Abdominal:      General: Bowel sounds are normal  There is no distension  Palpations: Abdomen is soft  Tenderness: There is no abdominal tenderness  Musculoskeletal: Normal range of motion  Comments: Bilateral great toes with ulcerations to plantar aspect with erythema around toe, no streaking   Skin:     General: Skin is warm and dry  Capillary Refill: Capillary refill takes less than 2 seconds  Neurological:      Mental Status: She is alert and oriented to person, place, and time  Cranial Nerves: No cranial nerve deficit     Psychiatric:         Behavior: Behavior normal          Vital Signs  ED Triage Vitals [01/19/21 1718]   Temperature Pulse Respirations Blood Pressure SpO2   98 6 °F (37 °C) 94 20 113/65 96 %      Temp Source Heart Rate Source Patient Position - Orthostatic VS BP Location FiO2 (%)   Oral -- Sitting Left arm --      Pain Score       --           Vitals:    01/19/21 1718   BP: 113/65   Pulse: 94   Patient Position - Orthostatic VS: Sitting         Visual Acuity      ED Medications  Medications   vancomycin (VANCOCIN) 1,750 mg in sodium chloride 0 9 % 500 mL IVPB (1,750 mg Intravenous New Bag 1/19/21 4049)   sodium chloride 0 9 % infusion (has no administration in time range)   acetaminophen (TYLENOL) tablet 650 mg (has no administration in time range)   ceFAZolin (ANCEF) IVPB (premix in dextrose) 2,000 mg 50 mL (has no administration in time range)   metroNIDAZOLE (FLAGYL) tablet 500 mg (has no administration in time range)   atorvastatin (LIPITOR) tablet 20 mg (has no administration in time range)   gabapentin (NEURONTIN) capsule 300 mg (has no administration in time range)   ziprasidone (GEODON) capsule 60 mg (has no administration in time range)   topiramate (TOPAMAX) tablet 100 mg (has no administration in time range)   insulin lispro (HumaLOG) 100 units/mL subcutaneous injection 1-6 Units (has no administration in time range)   insulin lispro (HumaLOG) 100 units/mL subcutaneous injection 1-5 Units (has no administration in time range)   cefepime (MAXIPIME) 2,000 mg in dextrose 5 % 50 mL IVPB (0 mg Intravenous Stopped 1/19/21 1825)   multi-electrolyte (PLASMALYTE-A/ISOLYTE-S PH 7 4) IV solution 1,000 mL (1,000 mL Intravenous New Bag 1/19/21 1853)       Diagnostic Studies  Results Reviewed     Procedure Component Value Units Date/Time    Wound culture and Gram stain [[id number]]     Lab Status: No result Specimen: Wound     Lactic acid, plasma [[id number]]  (Abnormal) Collected: 01/19/21 1750    Lab Status: Final result Specimen: Blood from Arm, Left Updated: 01/19/21 1838     LACTIC ACID 2 4 mmol/L     Narrative:      Result may be elevated if tourniquet was used during collection      Lactic acid 2 Hours [[id number]]     Lab Status: No result Specimen: Blood     Comprehensive metabolic panel [[id number]]  (Abnormal) Collected: 01/19/21 1750    Lab Status: Final result Specimen: Blood from Arm, Left Updated: 01/19/21 1837     Sodium 144 mmol/L      Potassium 3 7 mmol/L      Chloride 107 mmol/L      CO2 26 mmol/L      ANION GAP 11 mmol/L      BUN 15 mg/dL      Creatinine 0 86 mg/dL      Glucose 120 mg/dL      Calcium 9 1 mg/dL      AST 14 U/L      ALT 23 U/L Alkaline Phosphatase 118 U/L      Total Protein 7 8 g/dL      Albumin 3 7 g/dL      Total Bilirubin 0 40 mg/dL      eGFR 91 ml/min/1 73sq m     Narrative:      National Kidney Disease Foundation guidelines for Chronic Kidney Disease (CKD):     Stage 1 with normal or high GFR (GFR > 90 mL/min/1 73 square meters)    Stage 2 Mild CKD (GFR = 60-89 mL/min/1 73 square meters)    Stage 3A Moderate CKD (GFR = 45-59 mL/min/1 73 square meters)    Stage 3B Moderate CKD (GFR = 30-44 mL/min/1 73 square meters)    Stage 4 Severe CKD (GFR = 15-29 mL/min/1 73 square meters)    Stage 5 End Stage CKD (GFR <15 mL/min/1 73 square meters)  Note: GFR calculation is accurate only with a steady state creatinine    CBC and differential [[id number]]  (Abnormal) Collected: 01/19/21 1750    Lab Status: Final result Specimen: Blood from Arm, Left Updated: 01/19/21 1758     WBC 10 48 Thousand/uL      RBC 4 94 Million/uL      Hemoglobin 14 3 g/dL      Hematocrit 46 0 %      MCV 93 fL      MCH 28 9 pg      MCHC 31 1 g/dL      RDW 12 8 %      MPV 9 1 fL      Platelets 700 Thousands/uL      nRBC 0 /100 WBCs      Neutrophils Relative 59 %      Immat GRANS % 0 %      Lymphocytes Relative 31 %      Monocytes Relative 7 %      Eosinophils Relative 2 %      Basophils Relative 1 %      Neutrophils Absolute 6 18 Thousands/µL      Immature Grans Absolute 0 03 Thousand/uL      Lymphocytes Absolute 3 24 Thousands/µL      Monocytes Absolute 0 74 Thousand/µL      Eosinophils Absolute 0 23 Thousand/µL      Basophils Absolute 0 06 Thousands/µL     Blood culture [[id number]] Collected: 01/19/21 1750    Lab Status: In process Specimen: Blood from Arm, Right Updated: 01/19/21 1755    Blood culture [[id number]] Collected: 01/19/21 1750    Lab Status:  In process Specimen: Blood from Arm, Left Updated: 01/19/21 1755    Fingerstick Glucose (POCT) [[id number]]  (Normal) Collected: 01/19/21 1721    Lab Status: Final result Updated: 01/19/21 1722     POC Glucose 102 mg/dl                  XR foot 3+ views RIGHT    (Results Pending)   XR foot 3+ views LEFT    (Results Pending)              Procedures  Procedures         ED Course                             SBIRT 22yo+      Most Recent Value   SBIRT (22 yo +)   In order to provide better care to our patients, we are screening all of our patients for alcohol and drug use  Would it be okay to ask you these screening questions? Unable to answer at this time Filed at: 01/19/2021 1727                    MDM  28 yo F presents with infected bilateral great toe ulcers, h/o diabetes  Labs show leukocytosis, lactic acidosis  Will start broad spectrum antibiotics and admit for further management  Disposition  Final diagnoses:   Diabetic foot ulcer ([address] )     Time reflects when diagnosis was documented in both MDM as applicable and the Disposition within this note     Time User Action Codes Description Comment    1/19/2021  6:45 PM Raj Coker Add [X06 [id number],  L97 [id number]] Diabetic foot ulcer [facility])       ED Disposition     ED Disposition Condition Date/Time Comment    Admit Stable Tue Jan 19, 2021  6:50 PM Case was discussed with Dr Neel Biswas and the patient's admission status was agreed to be Admission Status: inpatient status to the service of Dr Neel Biswas   Follow-up Information    None         Patient's Medications    No medications on file     No discharge procedures on file      PDMP Review     None          ED Provider  Electronically Signed by           Seymour Barton MD  01/19/21 8472

## 2021-01-20 LAB
ANION GAP SERPL CALCULATED.3IONS-SCNC: 10 MMOL/L (ref 4–13)
BASOPHILS # BLD AUTO: 0.04 THOUSANDS/ΜL (ref 0–0.1)
BASOPHILS NFR BLD AUTO: 1 % (ref 0–1)
BUN SERPL-MCNC: 16 MG/DL (ref 5–25)
CALCIUM SERPL-MCNC: 8.3 MG/DL (ref 8.3–10.1)
CHLORIDE SERPL-SCNC: 109 MMOL/L (ref 100–108)
CO2 SERPL-SCNC: 22 MMOL/L (ref 21–32)
CREAT SERPL-MCNC: 0.69 MG/DL (ref 0.6–1.3)
EOSINOPHIL # BLD AUTO: 0.23 THOUSAND/ΜL (ref 0–0.61)
EOSINOPHIL NFR BLD AUTO: 3 % (ref 0–6)
ERYTHROCYTE [DISTWIDTH] IN BLOOD BY AUTOMATED COUNT: 12.8 % (ref 11.6–15.1)
EST. AVERAGE GLUCOSE BLD GHB EST-MCNC: 131 MG/DL
GFR SERPL CREATININE-BSD FRML MDRD: 117 ML/MIN/1.73SQ M
GLUCOSE SERPL-MCNC: 100 MG/DL (ref 65–140)
GLUCOSE SERPL-MCNC: 110 MG/DL (ref 65–140)
GLUCOSE SERPL-MCNC: 110 MG/DL (ref 65–140)
GLUCOSE SERPL-MCNC: 111 MG/DL (ref 65–140)
GLUCOSE SERPL-MCNC: 117 MG/DL (ref 65–140)
GLUCOSE SERPL-MCNC: 118 MG/DL (ref 65–140)
HBA1C MFR BLD: 6.2 %
HCT VFR BLD AUTO: 40.2 % (ref 34.8–46.1)
HGB BLD-MCNC: 12.5 G/DL (ref 11.5–15.4)
IMM GRANULOCYTES # BLD AUTO: 0.05 THOUSAND/UL (ref 0–0.2)
IMM GRANULOCYTES NFR BLD AUTO: 1 % (ref 0–2)
LYMPHOCYTES # BLD AUTO: 2.29 THOUSANDS/ΜL (ref 0.6–4.47)
LYMPHOCYTES NFR BLD AUTO: 32 % (ref 14–44)
MCH RBC QN AUTO: 28.8 PG (ref 26.8–34.3)
MCHC RBC AUTO-ENTMCNC: 31.1 G/DL (ref 31.4–37.4)
MCV RBC AUTO: 93 FL (ref 82–98)
MONOCYTES # BLD AUTO: 0.59 THOUSAND/ΜL (ref 0.17–1.22)
MONOCYTES NFR BLD AUTO: 8 % (ref 4–12)
NEUTROPHILS # BLD AUTO: 3.9 THOUSANDS/ΜL (ref 1.85–7.62)
NEUTS SEG NFR BLD AUTO: 55 % (ref 43–75)
NRBC BLD AUTO-RTO: 0 /100 WBCS
PLATELET # BLD AUTO: 241 THOUSANDS/UL (ref 149–390)
PMV BLD AUTO: 9.1 FL (ref 8.9–12.7)
POTASSIUM SERPL-SCNC: 3.7 MMOL/L (ref 3.5–5.3)
RBC # BLD AUTO: 4.34 MILLION/UL (ref 3.81–5.12)
SODIUM SERPL-SCNC: 141 MMOL/L (ref 136–145)
WBC # BLD AUTO: 7.1 THOUSAND/UL (ref 4.31–10.16)

## 2021-01-20 PROCEDURE — 0JBQ0ZZ EXCISION OF RIGHT FOOT SUBCUTANEOUS TISSUE AND FASCIA, OPEN APPROACH: ICD-10-PCS | Performed by: PODIATRIST

## 2021-01-20 PROCEDURE — 80048 BASIC METABOLIC PNL TOTAL CA: CPT | Performed by: INTERNAL MEDICINE

## 2021-01-20 PROCEDURE — 0JBR0ZZ EXCISION OF LEFT FOOT SUBCUTANEOUS TISSUE AND FASCIA, OPEN APPROACH: ICD-10-PCS | Performed by: PODIATRIST

## 2021-01-20 PROCEDURE — 87147 CULTURE TYPE IMMUNOLOGIC: CPT | Performed by: INTERNAL MEDICINE

## 2021-01-20 PROCEDURE — 85025 COMPLETE CBC W/AUTO DIFF WBC: CPT | Performed by: INTERNAL MEDICINE

## 2021-01-20 PROCEDURE — 87070 CULTURE OTHR SPECIMN AEROBIC: CPT | Performed by: INTERNAL MEDICINE

## 2021-01-20 PROCEDURE — 99232 SBSQ HOSP IP/OBS MODERATE 35: CPT | Performed by: INTERNAL MEDICINE

## 2021-01-20 PROCEDURE — 87205 SMEAR GRAM STAIN: CPT | Performed by: INTERNAL MEDICINE

## 2021-01-20 PROCEDURE — 82948 REAGENT STRIP/BLOOD GLUCOSE: CPT

## 2021-01-20 PROCEDURE — 87186 SC STD MICRODIL/AGAR DIL: CPT | Performed by: INTERNAL MEDICINE

## 2021-01-20 RX ORDER — HEPARIN SODIUM 5000 [USP'U]/ML
5000 INJECTION, SOLUTION INTRAVENOUS; SUBCUTANEOUS EVERY 8 HOURS SCHEDULED
Status: DISCONTINUED | OUTPATIENT
Start: 2021-01-20 | End: 2021-01-22 | Stop reason: HOSPADM

## 2021-01-20 RX ADMIN — SODIUM CHLORIDE 125 ML/HR: 0.9 INJECTION, SOLUTION INTRAVENOUS at 15:14

## 2021-01-20 RX ADMIN — CEFAZOLIN SODIUM 2000 MG: 2 SOLUTION INTRAVENOUS at 15:14

## 2021-01-20 RX ADMIN — GABAPENTIN 300 MG: 300 CAPSULE ORAL at 08:37

## 2021-01-20 RX ADMIN — ATORVASTATIN CALCIUM 20 MG: 20 TABLET, FILM COATED ORAL at 17:35

## 2021-01-20 RX ADMIN — OLANZAPINE 5 MG: 2.5 TABLET, FILM COATED ORAL at 21:41

## 2021-01-20 RX ADMIN — SODIUM CHLORIDE 125 ML/HR: 0.9 INJECTION, SOLUTION INTRAVENOUS at 06:20

## 2021-01-20 RX ADMIN — CEFAZOLIN SODIUM 2000 MG: 2 SOLUTION INTRAVENOUS at 06:20

## 2021-01-20 RX ADMIN — CEFAZOLIN SODIUM 2000 MG: 2 SOLUTION INTRAVENOUS at 22:39

## 2021-01-20 RX ADMIN — SODIUM CHLORIDE 125 ML/HR: 0.9 INJECTION, SOLUTION INTRAVENOUS at 22:39

## 2021-01-20 RX ADMIN — GABAPENTIN 300 MG: 300 CAPSULE ORAL at 17:35

## 2021-01-20 RX ADMIN — TOPIRAMATE 100 MG: 100 TABLET ORAL at 17:35

## 2021-01-20 RX ADMIN — METRONIDAZOLE 500 MG: 500 TABLET ORAL at 21:41

## 2021-01-20 RX ADMIN — ZIPRASIDONE HYDROCHLORIDE 60 MG: 20 CAPSULE ORAL at 17:35

## 2021-01-20 RX ADMIN — GABAPENTIN 300 MG: 300 CAPSULE ORAL at 21:41

## 2021-01-20 RX ADMIN — METRONIDAZOLE 500 MG: 500 TABLET ORAL at 13:43

## 2021-01-20 RX ADMIN — METRONIDAZOLE 500 MG: 500 TABLET ORAL at 05:20

## 2021-01-20 NOTE — ED NOTES
Patient stated that she is allergic to vancomycin  Provider spoke with patient, explaining the risk vs  Benefit  Provider stated to slow the drip rate to decrease risk of Red man syndrome  Patient educated       Nimesh Hogue RN  01/19/21 1910

## 2021-01-20 NOTE — CASE MANAGEMENT
LOS 1 DAY  RISK OF UNPLANNED READMISSION SCORE 8  30 DAY READMISSION: NO  BUNDLE: NO    CM met with patient bedside  Patient reports living with friend in a 2-story home  Patient states IPTA with all ADLs  Patient states she recently moved to the area from [facility] where she was previously living with her parents  Patient denies any DME use at home  No Hx VNA, STR, or SA identified  MH Hx confirmed  Patient states she has multiple Dx including AVE, MDD, and bipolar  Patient does not follow with an OP [place] [address] provider at this time  CM provided information regarding local OP [place] [address] provider  Patient states she will follow-up with this independently  PCP: Patient states [facility] provided  Preferred Pharmacy: [facility], no barriers identified to obtaining Rx from that location  CM reviewed discharge planning process including the following: identifying help at home, patient preference for discharge planning needs, pharmacy preference, and availability of treatment team to discuss questions or concerns patient and/or family may have regarding understanding medications and recognizing signs and symptoms once discharged  CM also encouraged patient to follow up with all recommended appointments after discharge  Patient advised of importance for patient and family to participate in managing patients medical well being  CM name and role reviewed  Discharge Checklist reviewed and CM will continue to monitor for progress toward discharge goals in nursing and provider rounds  Patient reports that her friend, [facility], will transport at discharge

## 2021-01-20 NOTE — PLAN OF CARE
Problem: Potential for Falls  Goal: Patient will remain free of falls  Description: INTERVENTIONS:  - Assess patient frequently for physical needs  -  Identify cognitive and physical deficits and behaviors that affect risk of falls    -  [place] fall precautions as indicated by assessment   - Educate patient/family on patient safety including physical limitations  - Instruct patient to call for assistance with activity based on assessment  - Modify environment to reduce risk of injury  - Consider OT/PT consult to assist with strengthening/mobility  Outcome: Progressing     Problem: PAIN - ADULT  Goal: Verbalizes/displays adequate comfort level or baseline comfort level  Description: Interventions:  - Encourage patient to monitor pain and request assistance  - Assess pain using appropriate pain scale  - Administer analgesics based on type and severity of pain and evaluate response  - Implement non-pharmacological measures as appropriate and evaluate response  - Consider cultural and social influences on pain and pain management  - Notify physician/advanced practitioner if interventions unsuccessful or patient reports new pain  Outcome: Progressing     Problem: INFECTION - ADULT  Goal: Absence or prevention of progression during hospitalization  Description: INTERVENTIONS:  - Assess and monitor for signs and symptoms of infection  - Monitor lab/diagnostic results  - Monitor all insertion sites, i e  indwelling lines, tubes, and drains  - Monitor endotracheal if appropriate and nasal secretions for changes in amount and color  - [place] appropriate cooling/warming therapies per order  - Administer medications as ordered  - Instruct and encourage patient and family to use good hand hygiene technique  - Identify and instruct in appropriate isolation precautions for identified infection/condition  Outcome: Progressing  Goal: Absence of fever/infection during neutropenic period  Description: INTERVENTIONS:  - Monitor WBC    Outcome: Progressing     Problem: SAFETY ADULT  Goal: Patient will remain free of falls  Description: INTERVENTIONS:  - Assess patient frequently for physical needs  -  Identify cognitive and physical deficits and behaviors that affect risk of falls    -  [place] fall precautions as indicated by assessment   - Educate patient/family on patient safety including physical limitations  - Instruct patient to call for assistance with activity based on assessment  - Modify environment to reduce risk of injury  - Consider OT/PT consult to assist with strengthening/mobility  Outcome: Progressing  Goal: Maintain or return to baseline ADL function  Description: INTERVENTIONS:  -  Assess patient's ability to carry out ADLs; assess patient's baseline for ADL function and identify physical deficits which impact ability to perform ADLs (bathing, care of mouth/teeth, toileting, grooming, dressing, etc )  - Assess/evaluate cause of self-care deficits   - Assess range of motion  - Assess patient's mobility; develop plan if impaired  - Assess patient's need for assistive devices and provide as appropriate  - Encourage maximum independence but intervene and supervise when necessary  - Involve family in performance of ADLs  - Assess for home care needs following discharge   - Consider OT consult to assist with ADL evaluation and planning for discharge  - Provide patient education as appropriate  Outcome: Progressing  Goal: Maintain or return mobility status to optimal level  Description: INTERVENTIONS:  - Assess patient's baseline mobility status (ambulation, transfers, stairs, etc )    - Identify cognitive and physical deficits and behaviors that affect mobility  - Identify mobility aids required to assist with transfers and/or ambulation (gait belt, sit-to-stand, lift, walker, cane, etc )  - [place] fall precautions as indicated by assessment  - Record patient progress and toleration of activity level on Mobility SBAR; progress patient to next Phase/Stage  - Instruct patient to call for assistance with activity based on assessment  - Consider rehabilitation consult to assist with strengthening/weightbearing, etc   Outcome: Progressing     Problem: DISCHARGE PLANNING  Goal: Discharge to home or other facility with appropriate resources  Description: INTERVENTIONS:  - Identify barriers to discharge w/patient and caregiver  - Arrange for needed discharge resources and transportation as appropriate  - Identify discharge learning needs (meds, wound care, etc )  - Arrange for interpretive services to assist at discharge as needed  - Refer to Case Management Department for coordinating discharge planning if the patient needs post-hospital services based on physician/advanced practitioner order or complex needs related to functional status, cognitive ability, or social support system  Outcome: Progressing     Problem: Knowledge Deficit  Goal: Patient/family/caregiver demonstrates understanding of disease process, treatment plan, medications, and discharge instructions  Description: Complete learning assessment and assess knowledge base    Interventions:  - Provide teaching at level of understanding  - Provide teaching via preferred learning methods  Outcome: Progressing

## 2021-01-20 NOTE — ASSESSMENT & PLAN NOTE
Lab Results   Component Value Date    HGBA1C 6 2 (H) 01/19/2021       Recent Labs     01/19/21  2111 01/20/21  0605 01/20/21  0740 01/20/21  1134   POCGLU 107 118 100 111     Blood Sugar Average: Last 72 hrs:  (P) 107 6     Presented with purulent drainage and ulceration of both of her great toes with associated redness warmth for the past week  Currently afebrile  Evaluated by podiatry  X-ray did not show any signs of osteomyelitis  Wound culture and blood culture still pending  Follow-up the result  Local wound care  Given IV cefepime and vancomycin in the emergency room  Currently on IV cefepime and metronidazole    Continue

## 2021-01-20 NOTE — PROGRESS NOTES
Progress Note - Michelle Buckley 1990, 27 y o  female MRN: [id number]    Unit/Bed#: -01 Encounter: [id number]    Primary Care Provider: No primary care provider on file  Date and time admitted to hospital: 1/19/2021  5:17 PM        * Diabetic foot infection [facility])  Assessment & Plan  Lab Results   Component Value Date    HGBA1C 6 2 (H) 01/19/2021       Recent Labs     01/19/21  2111 01/20/21  0605 01/20/21  0740 01/20/21  1134   POCGLU 107 118 100 111     Blood Sugar Average: Last 72 hrs:  (P) 107 6     Presented with purulent drainage and ulceration of both of her great toes with associated redness warmth for the past week  Currently afebrile  Evaluated by podiatry  X-ray did not show any signs of osteomyelitis  Wound culture and blood culture still pending  Follow-up the result  Local wound care  Given IV cefepime and vancomycin in the emergency room  Currently on IV cefepime and metronidazole  Continue    Seizure disorder [facility])  Assessment & Plan  Reports noncompliance with Topamax and Geodon  But takes Lamictal, reports no seizures in quite sometime  Will continue with Lamictal for now will need outpatient f/u    Diabetes [facility])  Assessment & Plan  No results found for: HGBA1C    Recent Labs     01/19/21  1721   POCGLU 102       Blood Sugar Average: Last 72 hrs:  (P) 102     Hold home p o  Diabetic agents  No previous records patient is new to the area  Will check A1c  Sliding scale insulin    Hyperlipidemia  Assessment & Plan  Continue home statin        VTE Pharmacologic Prophylaxis:   Pharmacologic: Heparin  Mechanical VTE Prophylaxis in Place: Yes    Patient Centered Rounds: I have performed bedside rounds with nursing staff today  Discussions with Specialists or Other Care Team Provider:  Podiatry, case management    Education and Discussions with Family / Patient:  Patient    Time Spent for Care:    More than 50% of total time spent on counseling and coordination of care as described above  Current Length of Stay: 1 day(s)    Current Patient Status: Inpatient   Certification Statement: The patient will continue to require additional inpatient hospital stay due to See above    Discharge Plan:  48 hours    Code Status: Level 1 - Full Code      Subjective:   I have seen and examined the patient bedside this morning  Patient denies any new complaints  Afebrile now  Had bedside debridement done by podiatry this morning  Objective:     Vitals:   Temp (24hrs), Av 4 °F (36 9 °C), Min:98 1 °F (36 7 °C), Max:98 6 °F (37 °C)    Temp:  [98 1 °F (36 7 °C)-98 6 °F (37 °C)] 98 1 °F (36 7 °C)  HR:  [84-94] 91  Resp:  [17-20] 18  BP: (107-113)/(62-71) 107/71  SpO2:  [91 %-96 %] 94 %  There is no height or weight on file to calculate BMI  Input and Output Summary (last 24 hours):        Intake/Output Summary (Last 24 hours) at 2021 1454  Last data filed at 2021 1300  Gross per 24 hour   Intake 660 ml   Output --   Net 660 ml       Physical Exam:     Physical Exam  General- Awake, alert and oriented x 3, looks comfortable  HEENT- Normocephalic, atraumatic  CVS- Normal S1/ S2, Regular rate and rhythm  Respiratory system- B/L clear breath sounds  Abdomen- Soft, Non distended, no tenderness  Musculoskeletal- both great toe ulcer, dressing present  CNS- No acute focal neurologic deficit noted    Additional Data:     Labs:    Results from last 7 days   Lab Units 21  0515   WBC Thousand/uL 7 10   HEMOGLOBIN g/dL 12 5   HEMATOCRIT % 40 2   PLATELETS Thousands/uL 241   NEUTROS PCT % 55   LYMPHS PCT % 32   MONOS PCT % 8   EOS PCT % 3     Results from last 7 days   Lab Units 21  0515 21  1750   SODIUM mmol/L 141 144   POTASSIUM mmol/L 3 7 3 7   CHLORIDE mmol/L 109* 107   CO2 mmol/L 22 26   BUN mg/dL 16 15   CREATININE mg/dL 0 69 0 86   ANION GAP mmol/L 10 11   CALCIUM mg/dL 8 3 9 1   ALBUMIN g/dL  --  3 7   TOTAL BILIRUBIN mg/dL  --  0 40   ALK PHOS U/L  --  118*   ALT U/L  --  23   AST U/L  --  14   GLUCOSE RANDOM mg/dL 117 120         Results from last 7 days   Lab Units 01/20/21  1134 01/20/21  0740 01/20/21  0605 01/19/21  2111 01/19/21  1721   POC GLUCOSE mg/dl 111 100 118 107 102     Results from last 7 days   Lab Units 01/19/21  1750   HEMOGLOBIN A1C % 6 2*     Results from last 7 days   Lab Units 01/19/21  2044 01/19/21  1750   LACTIC ACID mmol/L 2 0 2 4*           * I Have Reviewed All Lab Data Listed Above  * Additional Pertinent Lab Tests Reviewed: All Labs Within Last 24 Hours Reviewed    Imaging:    Imaging Reports Reviewed Today Include:   Imaging Personally Reviewed by Myself Includes:      Recent Cultures (last 7 days):     Results from last 7 days   Lab Units 01/19/21  1750   BLOOD CULTURE  Received in Microbiology Lab  Culture in Progress  Received in Microbiology Lab  Culture in Progress  Last 24 Hours Medication List:   Current Facility-Administered Medications   Medication Dose Route Frequency Provider Last Rate    acetaminophen  650 mg Oral Q6H PRN Dustin Wayne MD      atorvastatin  20 mg Oral Daily With Lisa Nichols MD      cefazolin  2,000 mg Intravenous Q8H Dustin Wayne MD 2,000 mg (01/20/21 7204)    gabapentin  300 mg Oral TID Dustin Wayne MD      insulin lispro  1-5 Units Subcutaneous HS Dustin Wayne MD      insulin lispro  1-6 Units Subcutaneous TID AC Dustin Wayne MD      metroNIDAZOLE  500 mg Oral Q8H [facility] Dustin Wayne MD      OLANZapine  5 mg Oral HS Ely Enriquez PA-C      sodium chloride  125 mL/hr Intravenous Continuous Dustin Wayne  mL/hr (01/20/21 8198)    topiramate  100 mg Oral BID Dustin Wayne MD      ziprasidone  60 mg Oral BID With Manan George MD          Today, Patient Was Seen By: Livia Ashley MD    ** Please Note: Dictation voice to text software may have been used in the creation of this document   **

## 2021-01-20 NOTE — PLAN OF CARE
Problem: Potential for Falls  Goal: Patient will remain free of falls  Description: INTERVENTIONS:  - Assess patient frequently for physical needs  -  Identify cognitive and physical deficits and behaviors that affect risk of falls    -  [place] fall precautions as indicated by assessment   - Educate patient/family on patient safety including physical limitations  - Instruct patient to call for assistance with activity based on assessment  - Modify environment to reduce risk of injury  - Consider OT/PT consult to assist with strengthening/mobility  Outcome: Progressing     Problem: PAIN - ADULT  Goal: Verbalizes/displays adequate comfort level or baseline comfort level  Description: Interventions:  - Encourage patient to monitor pain and request assistance  - Assess pain using appropriate pain scale  - Administer analgesics based on type and severity of pain and evaluate response  - Implement non-pharmacological measures as appropriate and evaluate response  - Consider cultural and social influences on pain and pain management  - Notify physician/advanced practitioner if interventions unsuccessful or patient reports new pain  Outcome: Progressing     Problem: INFECTION - ADULT  Goal: Absence or prevention of progression during hospitalization  Description: INTERVENTIONS:  - Assess and monitor for signs and symptoms of infection  - Monitor lab/diagnostic results  - Monitor all insertion sites, i e  indwelling lines, tubes, and drains  - Monitor endotracheal if appropriate and nasal secretions for changes in amount and color  - [place] appropriate cooling/warming therapies per order  - Administer medications as ordered  - Instruct and encourage patient and family to use good hand hygiene technique  - Identify and instruct in appropriate isolation precautions for identified infection/condition  Outcome: Progressing  Goal: Absence of fever/infection during neutropenic period  Description: INTERVENTIONS:  - Monitor WBC    Outcome: Progressing     Problem: SAFETY ADULT  Goal: Patient will remain free of falls  Description: INTERVENTIONS:  - Assess patient frequently for physical needs  -  Identify cognitive and physical deficits and behaviors that affect risk of falls    -  [place] fall precautions as indicated by assessment   - Educate patient/family on patient safety including physical limitations  - Instruct patient to call for assistance with activity based on assessment  - Modify environment to reduce risk of injury  - Consider OT/PT consult to assist with strengthening/mobility  Outcome: Progressing  Goal: Maintain or return to baseline ADL function  Description: INTERVENTIONS:  -  Assess patient's ability to carry out ADLs; assess patient's baseline for ADL function and identify physical deficits which impact ability to perform ADLs (bathing, care of mouth/teeth, toileting, grooming, dressing, etc )  - Assess/evaluate cause of self-care deficits   - Assess range of motion  - Assess patient's mobility; develop plan if impaired  - Assess patient's need for assistive devices and provide as appropriate  - Encourage maximum independence but intervene and supervise when necessary  - Involve family in performance of ADLs  - Assess for home care needs following discharge   - Consider OT consult to assist with ADL evaluation and planning for discharge  - Provide patient education as appropriate  Outcome: Progressing  Goal: Maintain or return mobility status to optimal level  Description: INTERVENTIONS:  - Assess patient's baseline mobility status (ambulation, transfers, stairs, etc )    - Identify cognitive and physical deficits and behaviors that affect mobility  - Identify mobility aids required to assist with transfers and/or ambulation (gait belt, sit-to-stand, lift, walker, cane, etc )  - [place] fall precautions as indicated by assessment  - Record patient progress and toleration of activity level on Mobility SBAR; progress patient to next Phase/Stage  - Instruct patient to call for assistance with activity based on assessment  - Consider rehabilitation consult to assist with strengthening/weightbearing, etc   Outcome: Progressing     Problem: DISCHARGE PLANNING  Goal: Discharge to home or other facility with appropriate resources  Description: INTERVENTIONS:  - Identify barriers to discharge w/patient and caregiver  - Arrange for needed discharge resources and transportation as appropriate  - Identify discharge learning needs (meds, wound care, etc )  - Arrange for interpretive services to assist at discharge as needed  - Refer to Case Management Department for coordinating discharge planning if the patient needs post-hospital services based on physician/advanced practitioner order or complex needs related to functional status, cognitive ability, or social support system  Outcome: Progressing     Problem: Knowledge Deficit  Goal: Patient/family/caregiver demonstrates understanding of disease process, treatment plan, medications, and discharge instructions  Description: Complete learning assessment and assess knowledge base    Interventions:  - Provide teaching at level of understanding  - Provide teaching via preferred learning methods  Outcome: Progressing

## 2021-01-20 NOTE — ASSESSMENT & PLAN NOTE
Reports noncompliance with Topamax and Geodon  But takes Lamictal, reports no seizures in quite sometime  Will continue with Lamictal for now will need outpatient f/u

## 2021-01-20 NOTE — CONSULTS
Consult - Danny Meadescottyuday 27 y o  female MRN: [id number]  Unit/Bed#: -01 Encounter: [id number]    Assessment/Plan     Assessment:  Diabetes with peripheral neuropathy  Bilateral infected ulcers both big toes  Plan:  Reviewed chart, labs, and imaging  Reviewed pathophysiology and treatment of this condition with the patient  Ulcers were debrided cleansed and dressed  Nursing instructions given for daily wound care  The patient will need follow-up in the podiatry office after discharge  The patient has been advised of the importance of following her general medical regimen and compliance with a good foot program   Excisional debridement of ulcers today with scalpel and scissors  Debridement of epidermis, dermis, subcutaneous tissue and exudate and slough to bleeding healthy tissue  Description and dimensions as below  History of Present Illness     HPI:  Tamra Villagomez is a 27 y o  female who presents with infected ulcers both big toes  Patient reports condition has been present for several weeks  She has neuropathy and does not feel her feet and is not sure how the ulcers developed  She recently moved here in November from [facility] and has no doctors in this area  She does states that she had a podiatrist at 1 time in [facility] but does not recall the last time he saw him  She does report a previous history of diabetic foot ulcers  Consults  Review of Systems   Constitutional: Negative  HENT: Negative  Eyes: Negative  Respiratory: Negative  Cardiovascular: Negative  Gastrointestinal: Negative  Musculoskeletal:  Negative   Skin:  Previous history of diabetic foot ulcers   Neurological:  History of seizure disorder  Psych: negative         Historical Information   Past Medical History:   Diagnosis Date    Anxiety     [facility] [address] [facility]     [facility]     Psychiatric disorder      Past Surgical History:   Procedure Laterality Date    OTHER SURGICAL HISTORY      Patient states she has surgery on "ear tubes", and bone spur in R hand, and 2nd toe on left foot    TONSILLECTOMY       Social History   Social History     Substance and Sexual Activity   Alcohol Use Never    Frequency: Never     Social History     Substance and Sexual Activity   Drug Use Never     Social History     Tobacco Use   Smoking Status Former Smoker    Quit date: 2020    Years since quittin 0   Smokeless Tobacco Never Used     Family History: History reviewed  No pertinent family history      Meds/Allergies   Medications Prior to Admission   Medication    glipiZIDE (GLUCOTROL XL) 5 mg 24 hr tablet    metFORMIN (GLUCOPHAGE) 1000 MG tablet    naproxen (NAPROSYN) 500 mg tablet    topiramate (TOPAMAX) 100 mg tablet    atorvastatin (LIPITOR) 20 mg tablet    Empagliflozin 10 MG TABS    gabapentin (NEURONTIN) 300 mg capsule    lamoTRIgine (LaMICtal) 100 mg tablet    Mirabegron ER 50 MG TB24    ziprasidone (GEODON) 60 mg capsule     Allergies   Allergen Reactions    Amoxicillin Itching    Macrobid [Nitrofurantoin] Itching    Vancomycin Rash       Objective   First Vitals:   Blood Pressure: 113/65 (21)  Pulse: 94 (21)  Temperature: 98 6 °F (37 °C) (21)  Temp Source: Oral (21)  Respirations: 20 (21)  SpO2: 96 % (21)    Current Vitals:   Blood Pressure: 108/62 (21)  Pulse: 84 (21)  Temperature: 98 6 °F (37 °C) (21)  Temp Source: Oral (21)  Respirations: 17 (21)  SpO2: 91 % (21)        /62   Pulse 84   Temp 98 6 °F (37 °C) (Oral)   Resp 17   SpO2 91%      General Appearance:    Alert, cooperative, no distress   Head:    Normocephalic, without obvious abnormality, atraumatic   Eyes:    PERRL, conjunctiva/corneas clear, EOM's intact        Nose:   Moist mucous membranes   Neck:   Supple, symmetrical, trachea midline   Back: Symmetric   Lungs:     Respirations unlabored   Heart:    Regular rate and rhythm, S1 and S2 normal, no murmur, rub   or gallop   Abdomen:     Soft, non-tender   Extremities: There are no acute deformities  There is no acute pain to palpation or range of motion  There is no focal weakness  Pulses:   Pedal pulses are palpable  There is no cyanosis or gangrene  There is no sign of acute evolving dysvascularity  Skin:   The skin is warm and dry  There is local edema and erythema confined mostly to the forefoot in the area of the great toe  This is present bilaterally  Left hallux shows full-thickness ulceration in the plantar aspect of the hallux and the toe sulcus  Ulcers irregularly shaped about 1 5 cm x 1 5 cm x 0 3 cm deep  There is a modest amount of dried crust and slough  There is thin filmy/fibrinous exudate which debrided to healthy bleeding tissue  There is no deep track or pus  There is no exposed bone or tendon  There is no palpable crepitus or fluctuance  There is no ascending streak  Right hallux shows full-thickness ulceration about 2 5 cm x 2 5 cm x 0 1 cm  Thin fibrinous exudate and some dry crust debride to healthy appearing bleeding tissue  There is no deep track or pus  There is no visible bone or tendon  There is no palpable crepitus or fluctuance  There is no ascending streak  Neurologic:   Gross sensation is diminished  Protective sensation is absent             Lab Results:   Admission on 01/19/2021   Component Date Value    POC Glucose 01/19/2021 102     WBC 01/19/2021 10 48*    RBC 01/19/2021 4 94     Hemoglobin 01/19/2021 14 3     Hematocrit 01/19/2021 46 0     MCV 01/19/2021 93     MCH 01/19/2021 28 9     MCHC 01/19/2021 31 1*    RDW 01/19/2021 12 8     MPV 01/19/2021 9 1     Platelets 83/29/1344 305     nRBC 01/19/2021 0     Neutrophils Relative 01/19/2021 59     Immat GRANS % 01/19/2021 0     Lymphocytes Relative 01/19/2021 31     Monocytes Relative 01/19/2021 7     Eosinophils Relative 01/19/2021 2     Basophils Relative 01/19/2021 1     Neutrophils Absolute 01/19/2021 6 18     Immature Grans Absolute 01/19/2021 0 03     Lymphocytes Absolute 01/19/2021 3 24     Monocytes Absolute 01/19/2021 0 74     Eosinophils Absolute 01/19/2021 0 23     Basophils Absolute 01/19/2021 0 06     Sodium 01/19/2021 144     Potassium 01/19/2021 3 7     Chloride 01/19/2021 107     CO2 01/19/2021 26     ANION GAP 01/19/2021 11     BUN 01/19/2021 15     Creatinine 01/19/2021 0 86     Glucose 01/19/2021 120     Calcium 01/19/2021 9 1     AST 01/19/2021 14     ALT 01/19/2021 23     Alkaline Phosphatase 01/19/2021 118*    Total Protein 01/19/2021 7 8     Albumin 01/19/2021 3 7     Total Bilirubin 01/19/2021 0 40     eGFR 01/19/2021 91     LACTIC ACID 01/19/2021 2 4*    Blood Culture 01/19/2021 Received in Microbiology Lab  Culture in Progress   Blood Culture 01/19/2021 Received in Microbiology Lab  Culture in Progress       LACTIC ACID 01/19/2021 2 0     Hemoglobin A1C 01/19/2021 6 2*    EAG 01/19/2021 131     POC Glucose 01/19/2021 107     Sodium 01/20/2021 141     Potassium 01/20/2021 3 7     Chloride 01/20/2021 109*    CO2 01/20/2021 22     ANION GAP 01/20/2021 10     BUN 01/20/2021 16     Creatinine 01/20/2021 0 69     Glucose 01/20/2021 117     Calcium 01/20/2021 8 3     eGFR 01/20/2021 117     WBC 01/20/2021 7 10     RBC 01/20/2021 4 34     Hemoglobin 01/20/2021 12 5     Hematocrit 01/20/2021 40 2     MCV 01/20/2021 93     MCH 01/20/2021 28 8     MCHC 01/20/2021 31 1*    RDW 01/20/2021 12 8     MPV 01/20/2021 9 1     Platelets 46/93/3383 241     nRBC 01/20/2021 0     Neutrophils Relative 01/20/2021 55     Immat GRANS % 01/20/2021 1     Lymphocytes Relative 01/20/2021 32     Monocytes Relative 01/20/2021 8     Eosinophils Relative 01/20/2021 3     Basophils Relative 01/20/2021 1     Neutrophils Absolute 01/20/2021 3 90     Immature Grans Absolute 01/20/2021 0 05     Lymphocytes Absolute 01/20/2021 2 29     Monocytes Absolute 01/20/2021 0 59     Eosinophils Absolute 01/20/2021 0 23     Basophils Absolute 01/20/2021 0 04     POC Glucose 01/20/2021 118              Results from last 7 days   Lab Units 01/19/21  1750   BLOOD CULTURE  Received in Microbiology Lab  Culture in Progress  Received in Microbiology Lab  Culture in Progress  Invalid input(s): LABAEARO            Imaging: I have personally reviewed pertinent films in PACS  EKG, Pathology, and Other Studies: I have personally reviewed pertinent reports        Code Status: Level 1 - Full Code  Advance Directive and Living Will:      Power of :    POLST:

## 2021-01-21 LAB
ANION GAP SERPL CALCULATED.3IONS-SCNC: 11 MMOL/L (ref 4–13)
BASOPHILS # BLD AUTO: 0.03 THOUSANDS/ΜL (ref 0–0.1)
BASOPHILS NFR BLD AUTO: 1 % (ref 0–1)
BUN SERPL-MCNC: 11 MG/DL (ref 5–25)
CALCIUM SERPL-MCNC: 8.8 MG/DL (ref 8.3–10.1)
CHLORIDE SERPL-SCNC: 109 MMOL/L (ref 100–108)
CO2 SERPL-SCNC: 22 MMOL/L (ref 21–32)
CREAT SERPL-MCNC: 0.73 MG/DL (ref 0.6–1.3)
EOSINOPHIL # BLD AUTO: 0.22 THOUSAND/ΜL (ref 0–0.61)
EOSINOPHIL NFR BLD AUTO: 4 % (ref 0–6)
ERYTHROCYTE [DISTWIDTH] IN BLOOD BY AUTOMATED COUNT: 12.7 % (ref 11.6–15.1)
EST. AVERAGE GLUCOSE BLD GHB EST-MCNC: 131 MG/DL
GFR SERPL CREATININE-BSD FRML MDRD: 111 ML/MIN/1.73SQ M
GLUCOSE SERPL-MCNC: 108 MG/DL (ref 65–140)
GLUCOSE SERPL-MCNC: 113 MG/DL (ref 65–140)
GLUCOSE SERPL-MCNC: 115 MG/DL (ref 65–140)
GLUCOSE SERPL-MCNC: 118 MG/DL (ref 65–140)
GLUCOSE SERPL-MCNC: 95 MG/DL (ref 65–140)
HBA1C MFR BLD: 6.2 %
HCT VFR BLD AUTO: 38.7 % (ref 34.8–46.1)
HGB BLD-MCNC: 12 G/DL (ref 11.5–15.4)
IMM GRANULOCYTES # BLD AUTO: 0.03 THOUSAND/UL (ref 0–0.2)
IMM GRANULOCYTES NFR BLD AUTO: 1 % (ref 0–2)
LYMPHOCYTES # BLD AUTO: 1.65 THOUSANDS/ΜL (ref 0.6–4.47)
LYMPHOCYTES NFR BLD AUTO: 30 % (ref 14–44)
MCH RBC QN AUTO: 29.1 PG (ref 26.8–34.3)
MCHC RBC AUTO-ENTMCNC: 31 G/DL (ref 31.4–37.4)
MCV RBC AUTO: 94 FL (ref 82–98)
MONOCYTES # BLD AUTO: 0.59 THOUSAND/ΜL (ref 0.17–1.22)
MONOCYTES NFR BLD AUTO: 11 % (ref 4–12)
NEUTROPHILS # BLD AUTO: 3.08 THOUSANDS/ΜL (ref 1.85–7.62)
NEUTS SEG NFR BLD AUTO: 53 % (ref 43–75)
NRBC BLD AUTO-RTO: 0 /100 WBCS
PLATELET # BLD AUTO: 207 THOUSANDS/UL (ref 149–390)
PMV BLD AUTO: 9 FL (ref 8.9–12.7)
POTASSIUM SERPL-SCNC: 3.8 MMOL/L (ref 3.5–5.3)
RBC # BLD AUTO: 4.12 MILLION/UL (ref 3.81–5.12)
SODIUM SERPL-SCNC: 142 MMOL/L (ref 136–145)
WBC # BLD AUTO: 5.6 THOUSAND/UL (ref 4.31–10.16)

## 2021-01-21 PROCEDURE — 80048 BASIC METABOLIC PNL TOTAL CA: CPT | Performed by: INTERNAL MEDICINE

## 2021-01-21 PROCEDURE — 99232 SBSQ HOSP IP/OBS MODERATE 35: CPT | Performed by: INTERNAL MEDICINE

## 2021-01-21 PROCEDURE — 85025 COMPLETE CBC W/AUTO DIFF WBC: CPT | Performed by: INTERNAL MEDICINE

## 2021-01-21 PROCEDURE — 83036 HEMOGLOBIN GLYCOSYLATED A1C: CPT | Performed by: INTERNAL MEDICINE

## 2021-01-21 PROCEDURE — 82948 REAGENT STRIP/BLOOD GLUCOSE: CPT

## 2021-01-21 RX ADMIN — CEFAZOLIN SODIUM 2000 MG: 2 SOLUTION INTRAVENOUS at 22:58

## 2021-01-21 RX ADMIN — CEFAZOLIN SODIUM 2000 MG: 2 SOLUTION INTRAVENOUS at 14:55

## 2021-01-21 RX ADMIN — METRONIDAZOLE 500 MG: 500 TABLET ORAL at 06:16

## 2021-01-21 RX ADMIN — ZIPRASIDONE HYDROCHLORIDE 60 MG: 20 CAPSULE ORAL at 17:24

## 2021-01-21 RX ADMIN — ATORVASTATIN CALCIUM 20 MG: 20 TABLET, FILM COATED ORAL at 17:24

## 2021-01-21 RX ADMIN — METRONIDAZOLE 500 MG: 500 TABLET ORAL at 14:55

## 2021-01-21 RX ADMIN — TOPIRAMATE 100 MG: 100 TABLET ORAL at 17:24

## 2021-01-21 RX ADMIN — GABAPENTIN 300 MG: 300 CAPSULE ORAL at 21:27

## 2021-01-21 RX ADMIN — CEFAZOLIN SODIUM 2000 MG: 2 SOLUTION INTRAVENOUS at 06:16

## 2021-01-21 RX ADMIN — OLANZAPINE 5 MG: 2.5 TABLET, FILM COATED ORAL at 21:28

## 2021-01-21 RX ADMIN — GABAPENTIN 300 MG: 300 CAPSULE ORAL at 17:24

## 2021-01-21 RX ADMIN — SODIUM CHLORIDE 125 ML/HR: 0.9 INJECTION, SOLUTION INTRAVENOUS at 17:24

## 2021-01-21 RX ADMIN — SODIUM CHLORIDE 125 ML/HR: 0.9 INJECTION, SOLUTION INTRAVENOUS at 08:54

## 2021-01-21 RX ADMIN — METRONIDAZOLE 500 MG: 500 TABLET ORAL at 21:28

## 2021-01-21 RX ADMIN — GABAPENTIN 300 MG: 300 CAPSULE ORAL at 08:55

## 2021-01-21 NOTE — PROGRESS NOTES
Progress Note - Tamar Villagomez 1990, 27 y o  female MRN: [id number]    Unit/Bed#: -01 Encounter: [id number]    Primary Care Provider: Estelita Medina PA-C   Date and time admitted to hospital: 1/19/2021  5:17 PM        * Diabetic foot infection [facility])  Assessment & Plan  Lab Results   Component Value Date    HGBA1C 6 2 (H) 01/21/2021       Recent Labs     01/20/21  2222 01/21/21  0700 01/21/21  1121 01/21/21  1553   POCGLU 110 108 115 118     Blood Sugar Average: Last 72 hrs:  (P) 109 9     Presented with purulent drainage and ulceration of both of her great toes with associated redness warmth for the past week  Currently afebrile  Evaluated by podiatry  X-ray did not show any signs of osteomyelitis  Wound culture growing   3+ Growth of Staphylococcus aureusAbnormal        2+ Growth of Beta Hemolytic Streptococcus Group BAbnormal          and blood culture no growth  Local wound care  Given IV cefepime and vancomycin in the emergency room  Currently on IV cefazolin and metronidazole  Continue  Based on final wound culture will decide about further antibiotic    Seizure disorder [facility])  Assessment & Plan  Reports noncompliance with Topamax and Geodon  But takes Lamictal, reports no seizures in quite sometime  Will continue with Lamictal for now will need outpatient f/u    Diabetes [facility])  Assessment & Plan  Lab Results   Component Value Date    HGBA1C 6 2 (H) 01/21/2021       Recent Labs     01/20/21  2222 01/21/21  0700 01/21/21  1121 01/21/21  1553   POCGLU 110 108 115 118       Blood Sugar Average: Last 72 hrs:  (P) 109 9     Hold home p o   Diabetic agents  No previous records patient is new to the area  Hemoglobin A1c 6 2  Sliding scale insulin    Hyperlipidemia  Assessment & Plan  Continue home statin        VTE Pharmacologic Prophylaxis:   Pharmacologic: Heparin  Mechanical VTE Prophylaxis in Place: Yes    Patient Centered Rounds: I have performed bedside rounds with nursing staff today     Discussions with Specialists or Other Care Team Provider:  Case management    Education and Discussions with Family / Patient:  Patient    Time Spent for Care: More than 50% of total time spent on counseling and coordination of care as described above  Current Length of Stay: 2 day(s)    Current Patient Status: Inpatient   Certification Statement:     Discharge Plan:  24-48 hours    Code Status: Level 1 - Full Code      Subjective:   I have seen and examined the patient bedside this morning  Patient denies any new complaints  Afebrile  Waiting for wound culture  On IV antibiotics now  Objective:     Vitals:   Temp (24hrs), Av 4 °F (36 3 °C), Min:97 3 °F (36 3 °C), Max:97 6 °F (36 4 °C)    Temp:  [97 3 °F (36 3 °C)-97 6 °F (36 4 °C)] 97 3 °F (36 3 °C)  HR:  [79-99] 81  Resp:  [14-18] 14  BP: ()/(70-89) 105/70  SpO2:  [90 %-95 %] 94 %  There is no height or weight on file to calculate BMI  Input and Output Summary (last 24 hours):        Intake/Output Summary (Last 24 hours) at 2021 1622  Last data filed at 2021 0300  Gross per 24 hour   Intake 360 ml   Output 0 ml   Net 360 ml       Physical Exam:     Physical Exam  General- Awake, alert and oriented x 3, looks comfortable  HEENT- Normocephalic, atraumatic  CVS- Normal S1/ S2, Regular rate and rhythm  Respiratory system- B/L clear breath sounds, no wheezing  Abdomen- Soft, Non distended, no tenderness, Bowel sound- present  Musculoskeletal- bilateral great toe dressing present  CNS- No acute focal neurologic deficit noted    Additional Data:     Labs:    Results from last 7 days   Lab Units 21  0458   WBC Thousand/uL 5 60   HEMOGLOBIN g/dL 12 0   HEMATOCRIT % 38 7   PLATELETS Thousands/uL 207   NEUTROS PCT % 53   LYMPHS PCT % 30   MONOS PCT % 11   EOS PCT % 4     Results from last 7 days   Lab Units 21  0458  21  1750   SODIUM mmol/L 142   < > 144   POTASSIUM mmol/L 3 8   < > 3 7   CHLORIDE mmol/L 109*   < > 107   CO2 mmol/L 22   < > 26   BUN mg/dL 11   < > 15   CREATININE mg/dL 0 73   < > 0 86   ANION GAP mmol/L 11   < > 11   CALCIUM mg/dL 8 8   < > 9 1   ALBUMIN g/dL  --   --  3 7   TOTAL BILIRUBIN mg/dL  --   --  0 40   ALK PHOS U/L  --   --  118*   ALT U/L  --   --  23   AST U/L  --   --  14   GLUCOSE RANDOM mg/dL 113   < > 120    < > = values in this interval not displayed  Results from last 7 days   Lab Units 01/21/21  1553 01/21/21  1121 01/21/21  0700 01/20/21  2222 01/20/21  1646 01/20/21  1134 01/20/21  0740 01/20/21  0605 01/19/21  2111 01/19/21  1721   POC GLUCOSE mg/dl 118 115 108 110 110 111 100 118 107 102     Results from last 7 days   Lab Units 01/21/21  0458 01/19/21  1750   HEMOGLOBIN A1C % 6 2* 6 2*     Results from last 7 days   Lab Units 01/19/21  2044 01/19/21  1750   LACTIC ACID mmol/L 2 0 2 4*           * I Have Reviewed All Lab Data Listed Above  * Additional Pertinent Lab Tests Reviewed: All Labs Within Last 24 Hours Reviewed    Imaging:    Imaging Reports Reviewed Today Include:   Imaging Personally Reviewed by Myself Includes:      Recent Cultures (last 7 days):     Results from last 7 days   Lab Units 01/20/21  0720 01/19/21  1750   BLOOD CULTURE   --  No Growth at 24 hrs  No Growth at 24 hrs     GRAM STAIN RESULT  No polys seen*  1+ Gram positive cocci in pairs*  --    WOUND CULTURE  3+ Growth of Staphylococcus aureus*  2+ Growth of Beta Hemolytic Streptococcus Group B*  --        Last 24 Hours Medication List:   Current Facility-Administered Medications   Medication Dose Route Frequency Provider Last Rate    acetaminophen  650 mg Oral Q6H PRN Dustin Wayne MD      atorvastatin  20 mg Oral Daily With Daniela Oakes MD      cefazolin  2,000 mg Intravenous Q8H Dustin Wayne MD 2,000 mg (01/21/21 0855)    gabapentin  300 mg Oral TID Dustin Wyane MD      heparin (porcine)  5,000 Units Subcutaneous Q8H [address], MD      insulin lispro  1-5 Units Subcutaneous HS Dustin Wayne MD      insulin lispro  1-6 Units Subcutaneous TID AC Dustin Wayne MD      metroNIDAZOLE  500 mg Oral Q8H [address] Dustin Wayne MD      OLANZapine  5 mg Oral HS Eleazar Enriquez PA-C      sodium chloride  125 mL/hr Intravenous Continuous Dustin Wayne  mL/hr (01/21/21 0854)    topiramate  100 mg Oral BID Dustin Wayne MD      ziprasidone  60 mg Oral BID With Garry Neumann MD          Today, Patient Was Seen By: Moise Will MD    ** Please Note: Dictation voice to text software may have been used in the creation of this document   **

## 2021-01-21 NOTE — PLAN OF CARE
Problem: Potential for Falls  Goal: Patient will remain free of falls  Description: INTERVENTIONS:  - Assess patient frequently for physical needs  -  Identify cognitive and physical deficits and behaviors that affect risk of falls    -  [place] fall precautions as indicated by assessment   - Educate patient/family on patient safety including physical limitations  - Instruct patient to call for assistance with activity based on assessment  - Modify environment to reduce risk of injury  - Consider OT/PT consult to assist with strengthening/mobility  Outcome: Progressing     Problem: PAIN - ADULT  Goal: Verbalizes/displays adequate comfort level or baseline comfort level  Description: Interventions:  - Encourage patient to monitor pain and request assistance  - Assess pain using appropriate pain scale  - Administer analgesics based on type and severity of pain and evaluate response  - Implement non-pharmacological measures as appropriate and evaluate response  - Consider cultural and social influences on pain and pain management  - Notify physician/advanced practitioner if interventions unsuccessful or patient reports new pain  Outcome: Progressing     Problem: INFECTION - ADULT  Goal: Absence or prevention of progression during hospitalization  Description: INTERVENTIONS:  - Assess and monitor for signs and symptoms of infection  - Monitor lab/diagnostic results  - Monitor all insertion sites, i e  indwelling lines, tubes, and drains  - Monitor endotracheal if appropriate and nasal secretions for changes in amount and color  - [place] appropriate cooling/warming therapies per order  - Administer medications as ordered  - Instruct and encourage patient and family to use good hand hygiene technique  - Identify and instruct in appropriate isolation precautions for identified infection/condition  Outcome: Progressing  Goal: Absence of fever/infection during neutropenic period  Description: INTERVENTIONS:  - Monitor WBC    Outcome: Progressing     Problem: SAFETY ADULT  Goal: Patient will remain free of falls  Description: INTERVENTIONS:  - Assess patient frequently for physical needs  -  Identify cognitive and physical deficits and behaviors that affect risk of falls    -  [place] fall precautions as indicated by assessment   - Educate patient/family on patient safety including physical limitations  - Instruct patient to call for assistance with activity based on assessment  - Modify environment to reduce risk of injury  - Consider OT/PT consult to assist with strengthening/mobility  Outcome: Progressing  Goal: Maintain or return to baseline ADL function  Description: INTERVENTIONS:  -  Assess patient's ability to carry out ADLs; assess patient's baseline for ADL function and identify physical deficits which impact ability to perform ADLs (bathing, care of mouth/teeth, toileting, grooming, dressing, etc )  - Assess/evaluate cause of self-care deficits   - Assess range of motion  - Assess patient's mobility; develop plan if impaired  - Assess patient's need for assistive devices and provide as appropriate  - Encourage maximum independence but intervene and supervise when necessary  - Involve family in performance of ADLs  - Assess for home care needs following discharge   - Consider OT consult to assist with ADL evaluation and planning for discharge  - Provide patient education as appropriate  Outcome: Progressing  Goal: Maintain or return mobility status to optimal level  Description: INTERVENTIONS:  - Assess patient's baseline mobility status (ambulation, transfers, stairs, etc )    - Identify cognitive and physical deficits and behaviors that affect mobility  - Identify mobility aids required to assist with transfers and/or ambulation (gait belt, sit-to-stand, lift, walker, cane, etc )  - [place] fall precautions as indicated by assessment  - Record patient progress and toleration of activity level on Mobility SBAR; progress patient to next Phase/Stage  - Instruct patient to call for assistance with activity based on assessment  - Consider rehabilitation consult to assist with strengthening/weightbearing, etc   Outcome: Progressing     Problem: DISCHARGE PLANNING  Goal: Discharge to home or other facility with appropriate resources  Description: INTERVENTIONS:  - Identify barriers to discharge w/patient and caregiver  - Arrange for needed discharge resources and transportation as appropriate  - Identify discharge learning needs (meds, wound care, etc )  - Arrange for interpretive services to assist at discharge as needed  - Refer to Case Management Department for coordinating discharge planning if the patient needs post-hospital services based on physician/advanced practitioner order or complex needs related to functional status, cognitive ability, or social support system  Outcome: Progressing     Problem: Knowledge Deficit  Goal: Patient/family/caregiver demonstrates understanding of disease process, treatment plan, medications, and discharge instructions  Description: Complete learning assessment and assess knowledge base    Interventions:  - Provide teaching at level of understanding  - Provide teaching via preferred learning methods  Outcome: Progressing

## 2021-01-21 NOTE — ASSESSMENT & PLAN NOTE
Lab Results   Component Value Date    HGBA1C 6 2 (H) 01/21/2021       Recent Labs     01/20/21  2222 01/21/21  0700 01/21/21  1121 01/21/21  1553   POCGLU 110 108 115 118     Blood Sugar Average: Last 72 hrs:  (P) 109 9     Presented with purulent drainage and ulceration of both of her great toes with associated redness warmth for the past week  Currently afebrile  Evaluated by podiatry  X-ray did not show any signs of osteomyelitis  Wound culture growing   3+ Growth of Staphylococcus aureusAbnormal        2+ Growth of Beta Hemolytic Streptococcus Group BAbnormal          and blood culture no growth  Local wound care  Given IV cefepime and vancomycin in the emergency room  Currently on IV cefazolin and metronidazole    Continue  Based on final wound culture will decide about further antibiotic

## 2021-01-21 NOTE — ASSESSMENT & PLAN NOTE
Lab Results   Component Value Date    HGBA1C 6 2 (H) 01/21/2021       Recent Labs     01/20/21  2222 01/21/21  0700 01/21/21  1121 01/21/21  1553   POCGLU 110 108 115 118       Blood Sugar Average: Last 72 hrs:  (P) 109 9     Hold home p o   Diabetic agents  No previous records patient is new to the area  Hemoglobin A1c 6 2  Sliding scale insulin

## 2021-01-21 NOTE — PLAN OF CARE
Problem: Potential for Falls  Goal: Patient will remain free of falls  Description: INTERVENTIONS:  - Assess patient frequently for physical needs  -  Identify cognitive and physical deficits and behaviors that affect risk of falls    -  [place] fall precautions as indicated by assessment   - Educate patient/family on patient safety including physical limitations  - Instruct patient to call for assistance with activity based on assessment  - Modify environment to reduce risk of injury  - Consider OT/PT consult to assist with strengthening/mobility  Outcome: Progressing     Problem: PAIN - ADULT  Goal: Verbalizes/displays adequate comfort level or baseline comfort level  Description: Interventions:  - Encourage patient to monitor pain and request assistance  - Assess pain using appropriate pain scale  - Administer analgesics based on type and severity of pain and evaluate response  - Implement non-pharmacological measures as appropriate and evaluate response  - Consider cultural and social influences on pain and pain management  - Notify physician/advanced practitioner if interventions unsuccessful or patient reports new pain  Outcome: Progressing     Problem: INFECTION - ADULT  Goal: Absence or prevention of progression during hospitalization  Description: INTERVENTIONS:  - Assess and monitor for signs and symptoms of infection  - Monitor lab/diagnostic results  - Monitor all insertion sites, i e  indwelling lines, tubes, and drains  - Monitor endotracheal if appropriate and nasal secretions for changes in amount and color  - [place] appropriate cooling/warming therapies per order  - Administer medications as ordered  - Instruct and encourage patient and family to use good hand hygiene technique  - Identify and instruct in appropriate isolation precautions for identified infection/condition  Outcome: Progressing  Goal: Absence of fever/infection during neutropenic period  Description: INTERVENTIONS:  - Monitor WBC    Outcome: Progressing     Problem: SAFETY ADULT  Goal: Patient will remain free of falls  Description: INTERVENTIONS:  - Assess patient frequently for physical needs  -  Identify cognitive and physical deficits and behaviors that affect risk of falls    -  [place] fall precautions as indicated by assessment   - Educate patient/family on patient safety including physical limitations  - Instruct patient to call for assistance with activity based on assessment  - Modify environment to reduce risk of injury  - Consider OT/PT consult to assist with strengthening/mobility  Outcome: Progressing  Goal: Maintain or return to baseline ADL function  Description: INTERVENTIONS:  -  Assess patient's ability to carry out ADLs; assess patient's baseline for ADL function and identify physical deficits which impact ability to perform ADLs (bathing, care of mouth/teeth, toileting, grooming, dressing, etc )  - Assess/evaluate cause of self-care deficits   - Assess range of motion  - Assess patient's mobility; develop plan if impaired  - Assess patient's need for assistive devices and provide as appropriate  - Encourage maximum independence but intervene and supervise when necessary  - Involve family in performance of ADLs  - Assess for home care needs following discharge   - Consider OT consult to assist with ADL evaluation and planning for discharge  - Provide patient education as appropriate  Outcome: Progressing  Goal: Maintain or return mobility status to optimal level  Description: INTERVENTIONS:  - Assess patient's baseline mobility status (ambulation, transfers, stairs, etc )    - Identify cognitive and physical deficits and behaviors that affect mobility  - Identify mobility aids required to assist with transfers and/or ambulation (gait belt, sit-to-stand, lift, walker, cane, etc )  - [place] fall precautions as indicated by assessment  - Record patient progress and toleration of activity level on Mobility SBAR; progress patient to next Phase/Stage  - Instruct patient to call for assistance with activity based on assessment  - Consider rehabilitation consult to assist with strengthening/weightbearing, etc   Outcome: Progressing     Problem: DISCHARGE PLANNING  Goal: Discharge to home or other facility with appropriate resources  Description: INTERVENTIONS:  - Identify barriers to discharge w/patient and caregiver  - Arrange for needed discharge resources and transportation as appropriate  - Identify discharge learning needs (meds, wound care, etc )  - Arrange for interpretive services to assist at discharge as needed  - Refer to Case Management Department for coordinating discharge planning if the patient needs post-hospital services based on physician/advanced practitioner order or complex needs related to functional status, cognitive ability, or social support system  Outcome: Progressing     Problem: Knowledge Deficit  Goal: Patient/family/caregiver demonstrates understanding of disease process, treatment plan, medications, and discharge instructions  Description: Complete learning assessment and assess knowledge base    Interventions:  - Provide teaching at level of understanding  - Provide teaching via preferred learning methods  Outcome: Progressing

## 2021-01-22 VITALS
TEMPERATURE: 98.5 F | SYSTOLIC BLOOD PRESSURE: 98 MMHG | RESPIRATION RATE: 19 BRPM | HEART RATE: 107 BPM | DIASTOLIC BLOOD PRESSURE: 64 MMHG | OXYGEN SATURATION: 93 %

## 2021-01-22 LAB
BACTERIA WND AEROBE CULT: ABNORMAL
BACTERIA WND AEROBE CULT: ABNORMAL
BASOPHILS # BLD AUTO: 0.03 THOUSANDS/ΜL (ref 0–0.1)
BASOPHILS NFR BLD AUTO: 1 % (ref 0–1)
CRP SERPL QL: 7 MG/L
EOSINOPHIL # BLD AUTO: 0.18 THOUSAND/ΜL (ref 0–0.61)
EOSINOPHIL NFR BLD AUTO: 3 % (ref 0–6)
ERYTHROCYTE [DISTWIDTH] IN BLOOD BY AUTOMATED COUNT: 12.4 % (ref 11.6–15.1)
GLUCOSE SERPL-MCNC: 115 MG/DL (ref 65–140)
GLUCOSE SERPL-MCNC: 98 MG/DL (ref 65–140)
GRAM STN SPEC: ABNORMAL
GRAM STN SPEC: ABNORMAL
HCT VFR BLD AUTO: 40.1 % (ref 34.8–46.1)
HGB BLD-MCNC: 12.5 G/DL (ref 11.5–15.4)
IMM GRANULOCYTES # BLD AUTO: 0.03 THOUSAND/UL (ref 0–0.2)
IMM GRANULOCYTES NFR BLD AUTO: 1 % (ref 0–2)
LYMPHOCYTES # BLD AUTO: 2.26 THOUSANDS/ΜL (ref 0.6–4.47)
LYMPHOCYTES NFR BLD AUTO: 38 % (ref 14–44)
MCH RBC QN AUTO: 28.7 PG (ref 26.8–34.3)
MCHC RBC AUTO-ENTMCNC: 31.2 G/DL (ref 31.4–37.4)
MCV RBC AUTO: 92 FL (ref 82–98)
MONOCYTES # BLD AUTO: 0.53 THOUSAND/ΜL (ref 0.17–1.22)
MONOCYTES NFR BLD AUTO: 9 % (ref 4–12)
NEUTROPHILS # BLD AUTO: 2.94 THOUSANDS/ΜL (ref 1.85–7.62)
NEUTS SEG NFR BLD AUTO: 48 % (ref 43–75)
NRBC BLD AUTO-RTO: 0 /100 WBCS
PLATELET # BLD AUTO: 214 THOUSANDS/UL (ref 149–390)
PMV BLD AUTO: 9.1 FL (ref 8.9–12.7)
PROCALCITONIN SERPL-MCNC: <0.05 NG/ML
RBC # BLD AUTO: 4.35 MILLION/UL (ref 3.81–5.12)
WBC # BLD AUTO: 5.97 THOUSAND/UL (ref 4.31–10.16)

## 2021-01-22 PROCEDURE — 86140 C-REACTIVE PROTEIN: CPT | Performed by: INTERNAL MEDICINE

## 2021-01-22 PROCEDURE — 84145 PROCALCITONIN (PCT): CPT | Performed by: INTERNAL MEDICINE

## 2021-01-22 PROCEDURE — 85025 COMPLETE CBC W/AUTO DIFF WBC: CPT | Performed by: INTERNAL MEDICINE

## 2021-01-22 PROCEDURE — 99238 HOSP IP/OBS DSCHRG MGMT 30/<: CPT | Performed by: INTERNAL MEDICINE

## 2021-01-22 PROCEDURE — 82948 REAGENT STRIP/BLOOD GLUCOSE: CPT

## 2021-01-22 RX ORDER — ACETAMINOPHEN 325 MG/1
650 TABLET ORAL EVERY 6 HOURS PRN
Refills: 0
Start: 2021-01-22 | End: 2021-01-27

## 2021-01-22 RX ORDER — SACCHAROMYCES BOULARDII 250 MG
250 CAPSULE ORAL 2 TIMES DAILY
Qty: 14 CAPSULE | Refills: 0 | Status: SHIPPED | OUTPATIENT
Start: 2021-01-22 | End: 2021-01-29

## 2021-01-22 RX ORDER — CEPHALEXIN 500 MG/1
500 CAPSULE ORAL EVERY 6 HOURS SCHEDULED
Qty: 28 CAPSULE | Refills: 0 | Status: SHIPPED | OUTPATIENT
Start: 2021-01-22 | End: 2021-01-29

## 2021-01-22 RX ADMIN — GABAPENTIN 300 MG: 300 CAPSULE ORAL at 09:04

## 2021-01-22 RX ADMIN — SODIUM CHLORIDE 125 ML/HR: 0.9 INJECTION, SOLUTION INTRAVENOUS at 10:00

## 2021-01-22 RX ADMIN — METRONIDAZOLE 500 MG: 500 TABLET ORAL at 06:15

## 2021-01-22 RX ADMIN — CEFAZOLIN SODIUM 2000 MG: 2 SOLUTION INTRAVENOUS at 06:15

## 2021-01-22 RX ADMIN — SODIUM CHLORIDE 125 ML/HR: 0.9 INJECTION, SOLUTION INTRAVENOUS at 00:17

## 2021-01-22 NOTE — DISCHARGE INSTR - AVS FIRST PAGE
Follow-up PCP in 1 week  Follow-up with podiatry in 1 week  Local wound care-   Clean both great toe with soap and water, cover with 4 x 4 Kerlex  Come back to the emergency room if condition worsen or recur  If worsening of wound, fever, chills come to ER or follow with podiatry

## 2021-01-22 NOTE — DISCHARGE INSTRUCTIONS
Diabetic Foot Ulcers   WHAT YOU NEED TO KNOW:   A diabetic foot ulcer can be redness over a bony area or an open sore  The ulcer can develop anywhere on your foot or toes  Ulcers usually develop on the bottom of the foot  You may not know you have an ulcer until you notice drainage on your sock  Drainage is fluid that may be yellow, brown, or red  The fluid may also contain pus or blood  DISCHARGE INSTRUCTIONS:   Call your local emergency number (911 in the [address] [facility]) if:  · You have a fever with chills  · You begin vomiting  · You feel faint or become confused  Call your doctor if:   · You see new drainage on your sock  · Your foot becomes red, warm, and swollen  · Your foot ulcer has a bad smell or is draining pus  · You feel pain in a foot that used to have little or no feeling  · You see black or dead tissue in or around your ulcer  · Your ulcer becomes bigger, deeper, or does not heal      · You have questions or concerns about your condition or care  Medicines:   · Antibiotics  may be given to help treat a bacterial infection  · Take your medicine as directed  Contact your healthcare provider if you think your medicine is not helping or if you have side effects  Tell him or her if you are allergic to any medicine  Keep a list of the medicines, vitamins, and herbs you take  Include the amounts, and when and why you take them  Bring the list or the pill bottles to follow-up visits  Carry your medicine list with you in case of an emergency  Care for your wound as directed:  A bandage will be put on your ulcer  Your healthcare provider will give you instructions on changing your bandage  You may need to clean the wound and change the bandage daily  The bandage may contain medicines to help your ulcer heal  You may be asked to put medicine on your foot ulcer before putting on the bandage  The medicine may also prevent growth of tissue that is not healthy   You may need to cover your wound with a plastic bag while you bathe  Ask your healthcare provider for instructions on bathing until your foot heals  Prevent diabetic foot ulcers:  Good foot care may help prevent ulcers, or keep them from getting worse  Ask someone to help you if you are not able to check your feet by yourself  You or another person may need to do any of the following:  · Keep your blood sugar levels under control  Continue the plan for your diabetes that you and your healthcare provider have discussed  Healthy food choices and taking your medicines as directed may help control blood sugars  Contact your healthcare provider if your blood sugar levels are higher than directed  · Wash your feet each day with soap and warm water  Do not use hot water, because this can injure your foot  Dry your feet gently with a towel after you wash them  Dry between and under your toes  · Apply lotion or a moisturizer on your dry feet  Ask your healthcare provider what lotions are best to use  Do not put lotion or moisturizer between your toes  Moisture between your toes could lead to skin breakdown  · Check your feet each day  Look at your whole foot, including the bottom, and between and under your toes  Check for wounds, corns, and calluses  Feel your feet by running your hands along the tops, bottoms, sides, and between your toes  Use a nonbreakable mirror to check your feet if you have trouble seeing the bottoms  Do not try to remove corns or calluses yourself  File or cut your toenails straight across  · Protect your feet  Do not walk barefoot or wear your shoes without socks  Check your shoes for rocks or other objects that can hurt your feet  Wear cotton socks to help keep your feet dry  Wear socks without toe seams, or wear them with the seams inside out  Change your socks each day  Do not wear socks that are dirty or damp  · Wear shoes that fit well    Wear shoes that do not rub against any area of your feet  Your shoes should be ½ to ¾ inch (1 to 2 centimeters) longer than your feet  Your shoes should also have extra space around the widest part of your feet  Walking or athletic shoes with laces or straps that adjust are best  Ask your healthcare provider for help to choose shoes that fit you best  Ask him or her if you need to wear an insert, orthotic, or bandage on your feet  · Do not smoke  Nicotine can cause damage to your blood vessels and increases your risk for foot ulcers  Do not use e-cigarettes or smokeless tobacco in place of cigarettes or to help you quit  They still contain nicotine  Ask your healthcare provider for information if you currently smoke and need help quitting  · Know the risks if you choose to drink alcohol  Alcohol can cause your blood sugar levels to be low if you use insulin  Alcohol can cause high blood sugar levels and weight gain if you drink too much  A drink of alcohol is 12 ounces of beer, 5 ounces of wine, or 1½ ounces of liquor  · Maintain a healthy weight  Ask your healthcare provider how much you should weigh  A healthy weight can help you control your diabetes  Ask him or her to help you create a weight loss plan if you are overweight  Even a 10 to 15 pound weight loss can help you better manage your blood sugar level  Follow up with your healthcare provider or foot specialist as directed: You may need to return often to have your wound checked  Your wound may be measured to see if it is getting smaller  Bring any offloading devices or footwear to your follow-up visits so your healthcare provider or specialist can check them  Write down your questions so you remember to ask them during your visits  © Copyright [address] Information is for End User's use only and may not be sold, redistributed or otherwise used for commercial purposes   All illustrations and images included in CareNotes® are the copyrighted property of [employer]  or [employer] [facility]  The above information is an  only  It is not intended as medical advice for individual conditions or treatments  Talk to your doctor, nurse or pharmacist before following any medical regimen to see if it is safe and effective for you

## 2021-01-22 NOTE — CASE MANAGEMENT
Per LEXII, patient is being discharged today  Her friend is going to assist with wound care  No CM needs  Cm department will continue to follow patient through discharge

## 2021-01-22 NOTE — PLAN OF CARE
Problem: Potential for Falls  Goal: Patient will remain free of falls  Description: INTERVENTIONS:  - Assess patient frequently for physical needs  -  Identify cognitive and physical deficits and behaviors that affect risk of falls    -  [place] fall precautions as indicated by assessment   - Educate patient/family on patient safety including physical limitations  - Instruct patient to call for assistance with activity based on assessment  - Modify environment to reduce risk of injury  - Consider OT/PT consult to assist with strengthening/mobility  Outcome: Progressing     Problem: PAIN - ADULT  Goal: Verbalizes/displays adequate comfort level or baseline comfort level  Description: Interventions:  - Encourage patient to monitor pain and request assistance  - Assess pain using appropriate pain scale  - Administer analgesics based on type and severity of pain and evaluate response  - Implement non-pharmacological measures as appropriate and evaluate response  - Consider cultural and social influences on pain and pain management  - Notify physician/advanced practitioner if interventions unsuccessful or patient reports new pain  Outcome: Progressing     Problem: INFECTION - ADULT  Goal: Absence or prevention of progression during hospitalization  Description: INTERVENTIONS:  - Assess and monitor for signs and symptoms of infection  - Monitor lab/diagnostic results  - Monitor all insertion sites, i e  indwelling lines, tubes, and drains  - Monitor endotracheal if appropriate and nasal secretions for changes in amount and color  - [place] appropriate cooling/warming therapies per order  - Administer medications as ordered  - Instruct and encourage patient and family to use good hand hygiene technique  - Identify and instruct in appropriate isolation precautions for identified infection/condition  Outcome: Progressing  Goal: Absence of fever/infection during neutropenic period  Description: INTERVENTIONS:  - Monitor WBC    Outcome: Progressing     Problem: SAFETY ADULT  Goal: Patient will remain free of falls  Description: INTERVENTIONS:  - Assess patient frequently for physical needs  -  Identify cognitive and physical deficits and behaviors that affect risk of falls    -  [place] fall precautions as indicated by assessment   - Educate patient/family on patient safety including physical limitations  - Instruct patient to call for assistance with activity based on assessment  - Modify environment to reduce risk of injury  - Consider OT/PT consult to assist with strengthening/mobility  Outcome: Progressing  Goal: Maintain or return to baseline ADL function  Description: INTERVENTIONS:  -  Assess patient's ability to carry out ADLs; assess patient's baseline for ADL function and identify physical deficits which impact ability to perform ADLs (bathing, care of mouth/teeth, toileting, grooming, dressing, etc )  - Assess/evaluate cause of self-care deficits   - Assess range of motion  - Assess patient's mobility; develop plan if impaired  - Assess patient's need for assistive devices and provide as appropriate  - Encourage maximum independence but intervene and supervise when necessary  - Involve family in performance of ADLs  - Assess for home care needs following discharge   - Consider OT consult to assist with ADL evaluation and planning for discharge  - Provide patient education as appropriate  Outcome: Progressing  Goal: Maintain or return mobility status to optimal level  Description: INTERVENTIONS:  - Assess patient's baseline mobility status (ambulation, transfers, stairs, etc )    - Identify cognitive and physical deficits and behaviors that affect mobility  - Identify mobility aids required to assist with transfers and/or ambulation (gait belt, sit-to-stand, lift, walker, cane, etc )  - [place] fall precautions as indicated by assessment  - Record patient progress and toleration of activity level on Mobility SBAR; progress patient to next Phase/Stage  - Instruct patient to call for assistance with activity based on assessment  - Consider rehabilitation consult to assist with strengthening/weightbearing, etc   Outcome: Progressing     Problem: DISCHARGE PLANNING  Goal: Discharge to home or other facility with appropriate resources  Description: INTERVENTIONS:  - Identify barriers to discharge w/patient and caregiver  - Arrange for needed discharge resources and transportation as appropriate  - Identify discharge learning needs (meds, wound care, etc )  - Arrange for interpretive services to assist at discharge as needed  - Refer to Case Management Department for coordinating discharge planning if the patient needs post-hospital services based on physician/advanced practitioner order or complex needs related to functional status, cognitive ability, or social support system  Outcome: Progressing     Problem: Knowledge Deficit  Goal: Patient/family/caregiver demonstrates understanding of disease process, treatment plan, medications, and discharge instructions  Description: Complete learning assessment and assess knowledge base    Interventions:  - Provide teaching at level of understanding  - Provide teaching via preferred learning methods  Outcome: Progressing

## 2021-01-22 NOTE — PLAN OF CARE
Problem: Potential for Falls  Goal: Patient will remain free of falls  Description: INTERVENTIONS:  - Assess patient frequently for physical needs  -  Identify cognitive and physical deficits and behaviors that affect risk of falls    -  [place] fall precautions as indicated by assessment   - Educate patient/family on patient safety including physical limitations  - Instruct patient to call for assistance with activity based on assessment  - Modify environment to reduce risk of injury  - Consider OT/PT consult to assist with strengthening/mobility  Outcome: Progressing     Problem: PAIN - ADULT  Goal: Verbalizes/displays adequate comfort level or baseline comfort level  Description: Interventions:  - Encourage patient to monitor pain and request assistance  - Assess pain using appropriate pain scale  - Administer analgesics based on type and severity of pain and evaluate response  - Implement non-pharmacological measures as appropriate and evaluate response  - Consider cultural and social influences on pain and pain management  - Notify physician/advanced practitioner if interventions unsuccessful or patient reports new pain  Outcome: Progressing     Problem: INFECTION - ADULT  Goal: Absence or prevention of progression during hospitalization  Description: INTERVENTIONS:  - Assess and monitor for signs and symptoms of infection  - Monitor lab/diagnostic results  - Monitor all insertion sites, i e  indwelling lines, tubes, and drains  - Monitor endotracheal if appropriate and nasal secretions for changes in amount and color  - [place] appropriate cooling/warming therapies per order  - Administer medications as ordered  - Instruct and encourage patient and family to use good hand hygiene technique  - Identify and instruct in appropriate isolation precautions for identified infection/condition  Outcome: Progressing  Goal: Absence of fever/infection during neutropenic period  Description: INTERVENTIONS:  - Monitor WBC    Outcome: Progressing     Problem: SAFETY ADULT  Goal: Patient will remain free of falls  Description: INTERVENTIONS:  - Assess patient frequently for physical needs  -  Identify cognitive and physical deficits and behaviors that affect risk of falls    -  [place] fall precautions as indicated by assessment   - Educate patient/family on patient safety including physical limitations  - Instruct patient to call for assistance with activity based on assessment  - Modify environment to reduce risk of injury  - Consider OT/PT consult to assist with strengthening/mobility  Outcome: Progressing  Goal: Maintain or return to baseline ADL function  Description: INTERVENTIONS:  -  Assess patient's ability to carry out ADLs; assess patient's baseline for ADL function and identify physical deficits which impact ability to perform ADLs (bathing, care of mouth/teeth, toileting, grooming, dressing, etc )  - Assess/evaluate cause of self-care deficits   - Assess range of motion  - Assess patient's mobility; develop plan if impaired  - Assess patient's need for assistive devices and provide as appropriate  - Encourage maximum independence but intervene and supervise when necessary  - Involve family in performance of ADLs  - Assess for home care needs following discharge   - Consider OT consult to assist with ADL evaluation and planning for discharge  - Provide patient education as appropriate  Outcome: Progressing  Goal: Maintain or return mobility status to optimal level  Description: INTERVENTIONS:  - Assess patient's baseline mobility status (ambulation, transfers, stairs, etc )    - Identify cognitive and physical deficits and behaviors that affect mobility  - Identify mobility aids required to assist with transfers and/or ambulation (gait belt, sit-to-stand, lift, walker, cane, etc )  - [place] fall precautions as indicated by assessment  - Record patient progress and toleration of activity level on Mobility SBAR; progress patient to next Phase/Stage  - Instruct patient to call for assistance with activity based on assessment  - Consider rehabilitation consult to assist with strengthening/weightbearing, etc   Outcome: Progressing     Problem: DISCHARGE PLANNING  Goal: Discharge to home or other facility with appropriate resources  Description: INTERVENTIONS:  - Identify barriers to discharge w/patient and caregiver  - Arrange for needed discharge resources and transportation as appropriate  - Identify discharge learning needs (meds, wound care, etc )  - Arrange for interpretive services to assist at discharge as needed  - Refer to Case Management Department for coordinating discharge planning if the patient needs post-hospital services based on physician/advanced practitioner order or complex needs related to functional status, cognitive ability, or social support system  Outcome: Progressing     Problem: Knowledge Deficit  Goal: Patient/family/caregiver demonstrates understanding of disease process, treatment plan, medications, and discharge instructions  Description: Complete learning assessment and assess knowledge base    Interventions:  - Provide teaching at level of understanding  - Provide teaching via preferred learning methods  Outcome: Progressing

## 2021-01-25 LAB
BACTERIA BLD CULT: NORMAL
BACTERIA BLD CULT: NORMAL

## 2021-01-25 NOTE — ASSESSMENT & PLAN NOTE
Lab Results   Component Value Date    HGBA1C 6 2 (H) 01/21/2021       No results for input(s): POCGLU in the last 72 hours      Blood Sugar Average: Last 72 hrs:  (P) 106 5     Resume home medication

## 2021-01-25 NOTE — DISCHARGE SUMMARY
Discharge- Rajeev Álvarez 1990, 27 y o  female MRN: [id number]    Unit/Bed#: -01 Encounter: [id number]    Primary Care Provider: Denise Wright PA-C   Date and time admitted to hospital: 1/19/2021  5:17 PM        * Diabetic foot infection [facility])  Assessment & Plan  Lab Results   Component Value Date    HGBA1C 6 2 (H) 01/21/2021       No results for input(s): POCGLU in the last 72 hours  Blood Sugar Average: Last 72 hrs:  (P) 106 5     Presented with purulent drainage and ulceration of both of her great toes with associated redness warmth for the past week  Currently afebrile  Evaluated by podiatry  X-ray did not show any signs of osteomyelitis  Wound culture growing MSSA  Wound looks much better now  Discussed with podiatry  Cleared for discharge with outpatient follow-up  Will discharge on oral antibiotics    Seizure disorder [facility])  Assessment & Plan  Resume home medication    Diabetes [facility])  Assessment & Plan  Lab Results   Component Value Date    HGBA1C 6 2 (H) 01/21/2021       No results for input(s): POCGLU in the last 72 hours      Blood Sugar Average: Last 72 hrs:  (P) 106 5     Resume home medication    Hyperlipidemia  Assessment & Plan  Continue home statin        Discharging Physician / Practitioner: Oscar Crigler, MD  PCP: Denise Wright PA-C  Admission Date:   Admission Orders (From admission, onward)     Ordered        01/19/21 Kurtis Garcia 57  Once                   Discharge Date: 01/22/21    Resolved Problems  Date Reviewed: 1/19/2021    None          Consultations During Hospital Stay:  · Podiatry    Procedures Performed:   · Wound cleaning and wound culture    Significant Findings / Test Results:   · Wound culture MSSA    No osteomyelitis and x-ray foot both  Chest x-ray no acute disease    Incidental Findings:   ·      Test Results Pending at Discharge (will require follow up):   ·      Outpatient Tests Requested:  · Follow-up with podiatry and PCP    Complications:      Reason for Admission:  Foot ulcer    Hospital Course:     Beka Paredes is a 27 y o  female patient who originally presented to the hospital on 1/19/2021 due to both great to foot ulcer  Ulcers are superficial, not deep  Clean by podiatry and started on IV antibiotics  Wound culture growing MSSA  Discussed with podiatry  Cleared for discharge with oral antibiotics  Follow-up with podiatry as outpatient  Local wound care  Patient said that her roommate will do the dressing, does not want VNA  Discussed with case management  Currently clinically and hemodynamically stable for discharge  Please see above list of diagnoses and related plan for additional information  Condition at Discharge: good     Discharge Day Visit / Exam:     Subjective:    Vitals: Blood Pressure: 98/64 (01/22/21 0806)  Pulse: (!) 107 (01/22/21 0806)  Temperature: 98 5 °F (36 9 °C) (01/22/21 0806)  Temp Source: Oral (01/19/21 1718)  Respirations: 19 (01/22/21 0806)  SpO2: 93 % (01/22/21 0806)  Exam:   Physical Exam  General- Awake, alert and oriented x 3, looks comfortable  HEENT- Normocephalic, atraumatic  CVS- Normal S1/ S2, Regular rate and rhythm  Respiratory system- B/L clear breath sounds, no wheezing  Abdomen- Soft, Non distended, no tenderness, Bowel sound- present  Musculoskeletal- bilateral great toe superficial wound, no redness now  CNS- No acute focal neurologic deficit noted  Discussion with Family:     Discharge instructions/Information to patient and family:   See after visit summary for information provided to patient and family  Provisions for Follow-Up Care:  See after visit summary for information related to follow-up care and any pertinent home health orders        Disposition:     Home    For Discharges to [facility] SNF:   · Not Applicable to this Patient - Not Applicable to this Patient    Planned Readmission:      Discharge Statement:  I spent 28 minutes discharging the patient  This time was spent on the day of discharge  I had direct contact with the patient on the day of discharge  Greater than 50% of the total time was spent examining patient, answering all patient questions, arranging and discussing plan of care with patient as well as directly providing post-discharge instructions  Additional time then spent on discharge activities  Discharge Medications:  See after visit summary for reconciled discharge medications provided to patient and family        ** Please Note: This note has been constructed using a voice recognition system **

## 2021-01-25 NOTE — ASSESSMENT & PLAN NOTE
Lab Results   Component Value Date    HGBA1C 6 2 (H) 01/21/2021       No results for input(s): POCGLU in the last 72 hours  Blood Sugar Average: Last 72 hrs:  (P) 106 5     Presented with purulent drainage and ulceration of both of her great toes with associated redness warmth for the past week  Currently afebrile  Evaluated by podiatry  X-ray did not show any signs of osteomyelitis  Wound culture growing MSSA  Wound looks much better now  Discussed with podiatry  Cleared for discharge with outpatient follow-up    Will discharge on oral antibiotics

## 2021-01-30 ENCOUNTER — HOSPITAL ENCOUNTER (EMERGENCY)
Facility: HOSPITAL | Age: 31
End: 2021-01-31
Attending: EMERGENCY MEDICINE
Payer: MEDICARE

## 2021-01-30 DIAGNOSIS — F32.A DEPRESSION: Primary | ICD-10-CM

## 2021-01-30 DIAGNOSIS — Z72.89 DELIBERATE SELF-CUTTING: ICD-10-CM

## 2021-01-30 LAB
ALBUMIN SERPL BCP-MCNC: 3.6 G/DL (ref 3.5–5)
ALP SERPL-CCNC: 99 U/L (ref 46–116)
ALT SERPL W P-5'-P-CCNC: 23 U/L (ref 12–78)
AMPHETAMINES SERPL QL SCN: NEGATIVE
ANION GAP SERPL CALCULATED.3IONS-SCNC: 11 MMOL/L (ref 4–13)
AST SERPL W P-5'-P-CCNC: 11 U/L (ref 5–45)
BARBITURATES UR QL: NEGATIVE
BASOPHILS # BLD AUTO: 0.02 THOUSANDS/ΜL (ref 0–0.1)
BASOPHILS NFR BLD AUTO: 0 % (ref 0–1)
BENZODIAZ UR QL: NEGATIVE
BILIRUB SERPL-MCNC: 0.4 MG/DL (ref 0.2–1)
BUN SERPL-MCNC: 17 MG/DL (ref 5–25)
CALCIUM SERPL-MCNC: 8.9 MG/DL (ref 8.3–10.1)
CHLORIDE SERPL-SCNC: 106 MMOL/L (ref 100–108)
CO2 SERPL-SCNC: 26 MMOL/L (ref 21–32)
COCAINE UR QL: NEGATIVE
CREAT SERPL-MCNC: 0.77 MG/DL (ref 0.6–1.3)
EOSINOPHIL # BLD AUTO: 0.32 THOUSAND/ΜL (ref 0–0.61)
EOSINOPHIL NFR BLD AUTO: 3 % (ref 0–6)
ERYTHROCYTE [DISTWIDTH] IN BLOOD BY AUTOMATED COUNT: 13.1 % (ref 11.6–15.1)
ETHANOL EXG-MCNC: 0 MG/DL
FLUAV RNA RESP QL NAA+PROBE: NEGATIVE
FLUBV RNA RESP QL NAA+PROBE: NEGATIVE
GFR SERPL CREATININE-BSD FRML MDRD: 104 ML/MIN/1.73SQ M
GLUCOSE SERPL-MCNC: 107 MG/DL (ref 65–140)
HCG SERPL QL: NEGATIVE
HCT VFR BLD AUTO: 44.7 % (ref 34.8–46.1)
HGB BLD-MCNC: 13.9 G/DL (ref 11.5–15.4)
LYMPHOCYTES # BLD AUTO: 4.04 THOUSANDS/ΜL (ref 0.6–4.47)
LYMPHOCYTES NFR BLD AUTO: 43 % (ref 14–44)
MCH RBC QN AUTO: 28.7 PG (ref 26.8–34.3)
MCHC RBC AUTO-ENTMCNC: 31.1 G/DL (ref 31.4–37.4)
MCV RBC AUTO: 92 FL (ref 82–98)
METHADONE UR QL: NEGATIVE
MONOCYTES # BLD AUTO: 0.75 THOUSAND/ΜL (ref 0.17–1.22)
MONOCYTES NFR BLD AUTO: 8 % (ref 4–12)
NEUTROPHILS # BLD AUTO: 4.2 THOUSANDS/ΜL (ref 1.85–7.62)
NEUTS SEG NFR BLD AUTO: 46 % (ref 43–75)
OPIATES UR QL SCN: NEGATIVE
OXYCODONE+OXYMORPHONE UR QL SCN: NEGATIVE
PCP UR QL: NEGATIVE
PLATELET # BLD AUTO: 295 THOUSANDS/UL (ref 149–390)
PMV BLD AUTO: 9.8 FL (ref 8.9–12.7)
POTASSIUM SERPL-SCNC: 3.4 MMOL/L (ref 3.5–5.3)
PROT SERPL-MCNC: 7.6 G/DL (ref 6.4–8.2)
RBC # BLD AUTO: 4.85 MILLION/UL (ref 3.81–5.12)
RSV RNA RESP QL NAA+PROBE: NEGATIVE
SARS-COV-2 RNA RESP QL NAA+PROBE: NEGATIVE
SODIUM SERPL-SCNC: 143 MMOL/L (ref 136–145)
THC UR QL: NEGATIVE
WBC # BLD AUTO: 9.33 THOUSAND/UL (ref 4.31–10.16)

## 2021-01-30 PROCEDURE — 93005 ELECTROCARDIOGRAM TRACING: CPT

## 2021-01-30 PROCEDURE — 80307 DRUG TEST PRSMV CHEM ANLYZR: CPT | Performed by: EMERGENCY MEDICINE

## 2021-01-30 PROCEDURE — 36415 COLL VENOUS BLD VENIPUNCTURE: CPT | Performed by: EMERGENCY MEDICINE

## 2021-01-30 PROCEDURE — 0241U HB NFCT DS VIR RESP RNA 4 TRGT: CPT | Performed by: EMERGENCY MEDICINE

## 2021-01-30 PROCEDURE — 80053 COMPREHEN METABOLIC PANEL: CPT | Performed by: EMERGENCY MEDICINE

## 2021-01-30 PROCEDURE — 85025 COMPLETE CBC W/AUTO DIFF WBC: CPT | Performed by: EMERGENCY MEDICINE

## 2021-01-30 PROCEDURE — 82075 ASSAY OF BREATH ETHANOL: CPT | Performed by: EMERGENCY MEDICINE

## 2021-01-30 PROCEDURE — 84703 CHORIONIC GONADOTROPIN ASSAY: CPT | Performed by: EMERGENCY MEDICINE

## 2021-01-30 PROCEDURE — 99285 EMERGENCY DEPT VISIT HI MDM: CPT | Performed by: EMERGENCY MEDICINE

## 2021-01-30 PROCEDURE — 99285 EMERGENCY DEPT VISIT HI MDM: CPT

## 2021-01-30 RX ORDER — GABAPENTIN 300 MG/1
300 CAPSULE ORAL ONCE
Status: COMPLETED | OUTPATIENT
Start: 2021-01-30 | End: 2021-01-30

## 2021-01-30 RX ORDER — LAMOTRIGINE 25 MG/1
25 TABLET ORAL ONCE
Status: COMPLETED | OUTPATIENT
Start: 2021-01-30 | End: 2021-01-30

## 2021-01-30 RX ORDER — GLIPIZIDE 2.5 MG/1
5 TABLET, EXTENDED RELEASE ORAL
Status: DISCONTINUED | OUTPATIENT
Start: 2021-01-31 | End: 2021-01-31 | Stop reason: HOSPADM

## 2021-01-30 RX ORDER — TOPIRAMATE 25 MG/1
25 TABLET ORAL 2 TIMES DAILY
Status: DISCONTINUED | OUTPATIENT
Start: 2021-01-31 | End: 2021-01-31 | Stop reason: HOSPADM

## 2021-01-30 RX ORDER — ATORVASTATIN CALCIUM 10 MG/1
10 TABLET, FILM COATED ORAL
Status: DISCONTINUED | OUTPATIENT
Start: 2021-01-31 | End: 2021-01-31 | Stop reason: HOSPADM

## 2021-01-30 RX ORDER — SACCHAROMYCES BOULARDII 250 MG
250 CAPSULE ORAL 2 TIMES DAILY
Status: DISCONTINUED | OUTPATIENT
Start: 2021-01-31 | End: 2021-01-31 | Stop reason: HOSPADM

## 2021-01-30 RX ADMIN — METFORMIN HYDROCHLORIDE 500 MG: 500 TABLET ORAL at 23:37

## 2021-01-30 RX ADMIN — GABAPENTIN 300 MG: 300 CAPSULE ORAL at 23:37

## 2021-01-30 RX ADMIN — LAMOTRIGINE 25 MG: 25 TABLET ORAL at 23:38

## 2021-01-30 NOTE — ED PROVIDER NOTES
History  Chief Complaint   Patient presents with    Psychiatric Evaluation     Pt presents to ED via BLS from home with c/o's depression, pt states she had an anxiety attack last night, states she cut herself on her (L) wrist yesterday morning  Healthy appearing adult presents in no distress  27year old female patient presents emergency department for evaluation of suicide attempt by cutting  There are no lacerations that appear in need of suturing  The patient states that she did not feel safe at home, she is concerned that she will kill herself, she came in voluntarily to commit herself for psychiatric evaluation  Because of the clear plan on her suicide attempt, if the patient were to decide to not sign a 201 I would petition a 302  History provided by:  Patient   used: No    Psychiatric Evaluation  Presenting symptoms: suicidal thoughts, suicidal threats and suicide attempt    Degree of incapacity (severity): Moderate  Timing:  Constant  Relieved by:  Nothing  Associated symptoms: anhedonia, anxiety and poor judgment        Prior to Admission Medications   Prescriptions Last Dose Informant Patient Reported? Taking?    Empagliflozin 10 MG TABS   Yes No   Sig: Take 10 mg by mouth   Mirabegron ER 50 MG TB24   Yes No   Sig: Take 50 mg by mouth   atorvastatin (LIPITOR) 20 mg tablet   Yes No   Sig: Take 20 mg by mouth   cephalexin (KEFLEX) 500 mg capsule   No No   Sig: Take 1 capsule (500 mg total) by mouth every 6 (six) hours for 7 days   gabapentin (NEURONTIN) 300 mg capsule   Yes No   Sig: Take 300 mg by mouth   glipiZIDE (GLUCOTROL XL) 5 mg 24 hr tablet   Yes No   Sig: Take 5 mg by mouth daily   lamoTRIgine (LaMICtal) 100 mg tablet   Yes No   Sig: Take 100 mg by mouth   metFORMIN (GLUCOPHAGE) 1000 MG tablet   Yes No   Sig: Take 1,000 mg by mouth 2 (two) times a day with meals   naproxen (NAPROSYN) 500 mg tablet   Yes No   Sig: Take 500 mg by mouth 2 (two) times a day with meals saccharomyces boulardii (FLORASTOR) 250 mg capsule   No No   Sig: Take 1 capsule (250 mg total) by mouth 2 (two) times a day for 7 days   topiramate (TOPAMAX) 100 mg tablet   Yes No   Sig: Take 100 mg by mouth Takes 2 tabs at bed time   ziprasidone (GEODON) 60 mg capsule   Yes No   Sig: Take 60 mg by mouth      Facility-Administered Medications: None       Past Medical History:   Diagnosis Date    Anxiety     Bipolar disorder ([place] [address] )     Depression     Diabetes mellitus ([place] [address] )     Epilepsy ([place] [address] )     Psychiatric disorder        Past Surgical History:   Procedure Laterality Date    OTHER SURGICAL HISTORY      Patient states she has surgery on "ear tubes", and bone spur in R hand, and 2nd toe on left foot    TONSILLECTOMY         No family history on file  I have reviewed and agree with the history as documented  E-Cigarette/Vaping     E-Cigarette/Vaping Substances    Nicotine No      Social History     Tobacco Use    Smoking status: Former Smoker     Quit date: 2020     Years since quittin 1    Smokeless tobacco: Never Used    Tobacco comment: Pt reports smoking aprox 1 pk of cigarettes daily   Substance Use Topics    Alcohol use: Never     Frequency: Never    Drug use: Never       Review of Systems   Psychiatric/Behavioral: Positive for suicidal ideas  The patient is nervous/anxious  All other systems reviewed and are negative  Physical Exam  Physical Exam  Vitals signs and nursing note reviewed  Constitutional:       Appearance: She is well-developed  HENT:      Head: Normocephalic and atraumatic  Right Ear: External ear normal       Left Ear: External ear normal    Eyes:      Conjunctiva/sclera: Conjunctivae normal    Neck:      Thyroid: No thyromegaly  Vascular: No JVD  Trachea: No tracheal deviation  Cardiovascular:      Rate and Rhythm: Normal rate  Pulmonary:      Effort: Pulmonary effort is normal       Breath sounds: Normal breath sounds  No stridor  Abdominal:      General: There is no distension  Palpations: Abdomen is soft  There is no mass  Tenderness: There is no abdominal tenderness  There is no guarding  Hernia: No hernia is present  Musculoskeletal: Normal range of motion  General: No tenderness or deformity  Lymphadenopathy:      Cervical: No cervical adenopathy  Skin:     General: Skin is warm  Coloration: Skin is not pale  Findings: No erythema or rash  Neurological:      Mental Status: She is alert and oriented to person, place, and time     Psychiatric:         Behavior: Behavior normal          Vital Signs  ED Triage Vitals   Temperature Pulse Respirations Blood Pressure SpO2   01/30/21 1617 01/30/21 1617 01/30/21 1617 01/30/21 1617 01/30/21 1617   99 °F (37 2 °C) 94 18 134/73 98 %      Temp Source Heart Rate Source Patient Position - Orthostatic VS BP Location FiO2 (%)   01/30/21 1617 01/30/21 1617 01/30/21 1617 01/30/21 1617 --   Oral Monitor Sitting Right arm       Pain Score       01/30/21 2120       No Pain           Vitals:    01/30/21 1617 01/30/21 2120 01/31/21 0727 01/31/21 1303   BP: 134/73 117/78 101/55 115/62   Pulse: 94 82 104 98   Patient Position - Orthostatic VS: Sitting Sitting Lying Sitting         Visual Acuity      ED Medications  Medications   gabapentin (NEURONTIN) capsule 300 mg (300 mg Oral Given 1/30/21 2337)   lamoTRIgine (LaMICtal) tablet 25 mg (25 mg Oral Given 1/30/21 2338)   metFORMIN (GLUCOPHAGE) tablet 500 mg (500 mg Oral Given 1/30/21 2337)       Diagnostic Studies  Results Reviewed     Procedure Component Value Units Date/Time    Fingerstick Glucose (POCT) [[id number]]  (Normal) Collected: 01/31/21 0725    Lab Status: Final result Updated: 01/31/21 0924     POC Glucose 115 mg/dl     Comprehensive metabolic panel [[id number]]  (Abnormal) Collected: 01/30/21 2317    Lab Status: Final result Specimen: Blood from Arm, Right Updated: 01/30/21 2334     Sodium 143 mmol/L Potassium 3 4 mmol/L      Chloride 106 mmol/L      CO2 26 mmol/L      ANION GAP 11 mmol/L      BUN 17 mg/dL      Creatinine 0 77 mg/dL      Glucose 107 mg/dL      Calcium 8 9 mg/dL      AST 11 U/L      ALT 23 U/L      Alkaline Phosphatase 99 U/L      Total Protein 7 6 g/dL      Albumin 3 6 g/dL      Total Bilirubin 0 40 mg/dL      eGFR 104 ml/min/1 73sq m     Narrative:      National Kidney Disease Foundation guidelines for Chronic Kidney Disease (CKD):     Stage 1 with normal or high GFR (GFR > 90 mL/min/1 73 square meters)    Stage 2 Mild CKD (GFR = 60-89 mL/min/1 73 square meters)    Stage 3A Moderate CKD (GFR = 45-59 mL/min/1 73 square meters)    Stage 3B Moderate CKD (GFR = 30-44 mL/min/1 73 square meters)    Stage 4 Severe CKD (GFR = 15-29 mL/min/1 73 square meters)    Stage 5 End Stage CKD (GFR <15 mL/min/1 73 square meters)  Note: GFR calculation is accurate only with a steady state creatinine    CBC and differential [[id number]]  (Abnormal) Collected: 01/30/21 2317    Lab Status: Final result Specimen: Blood from Arm, Right Updated: 01/30/21 2321     WBC 9 33 Thousand/uL      RBC 4 85 Million/uL      Hemoglobin 13 9 g/dL      Hematocrit 44 7 %      MCV 92 fL      MCH 28 7 pg      MCHC 31 1 g/dL      RDW 13 1 %      MPV 9 8 fL      Platelets 197 Thousands/uL      Neutrophils Relative 46 %      Lymphocytes Relative 43 %      Monocytes Relative 8 %      Eosinophils Relative 3 %      Basophils Relative 0 %      Neutrophils Absolute 4 20 Thousands/µL      Lymphocytes Absolute 4 04 Thousands/µL      Monocytes Absolute 0 75 Thousand/µL      Eosinophils Absolute 0 32 Thousand/µL      Basophils Absolute 0 02 Thousands/µL     COVID19, Influenza A/B, RSV PCR, [facility] [[id number]]  (Normal) Collected: 01/30/21 1751    Lab Status: Final result Specimen: Nares from Nasopharyngeal Swab Updated: 01/30/21 1847     SARS-CoV-2 Negative     INFLUENZA A PCR Negative     INFLUENZA B PCR Negative     RSV PCR Negative Narrative: This test has been authorized by FDA under an EUA (Emergency Use Assay) for use by authorized laboratories  Clinical caution and judgement should be used with the interpretation of these results with consideration of the clinical impression and other laboratory testing  Testing reported as "Positive" or "Negative" has been proven to be accurate according to standard laboratory validation requirements  All testing is performed with control materials showing appropriate reactivity at standard intervals  hCG, qualitative pregnancy [[id number]]  (Normal) Collected: 01/30/21 1751    Lab Status: Final result Specimen: Blood from Arm, Right Updated: 01/30/21 1820     Preg, Serum Negative    Rapid drug screen, urine [[id number]]  (Normal) Collected: 01/30/21 1648    Lab Status: Final result Specimen: Urine, Clean Catch Updated: 01/30/21 1719     Amph/Meth UR Negative     Barbiturate Ur Negative     Benzodiazepine Urine Negative     Cocaine Urine Negative     Methadone Urine Negative     Opiate Urine Negative     PCP Ur Negative     THC Urine Negative     Oxycodone Urine Negative    Narrative:      FOR MEDICAL PURPOSES ONLY  IF CONFIRMATION NEEDED PLEASE CONTACT THE LAB WITHIN 5 DAYS  Drug Screen Cutoff Levels:  AMPHETAMINE/METHAMPHETAMINES  1000 ng/mL  BARBITURATES     200 ng/mL  BENZODIAZEPINES     200 ng/mL  COCAINE      300 ng/mL  METHADONE      300 ng/mL  OPIATES      300 ng/mL  PHENCYCLIDINE     25 ng/mL  THC       50 ng/mL  OXYCODONE      100 ng/mL    POCT alcohol breath test [[id number]]  (Normal) Resulted: 01/30/21 1647    Lab Status: Final result Updated: 01/30/21 1647     EXTBreath Alcohol 0 000                 No orders to display              Procedures  Procedures         ED Course                             SBIRT 20yo+      Most Recent Value   SBIRT (25 yo +)   In order to provide better care to our patients, we are screening all of our patients for alcohol and drug use   Would it be okay to ask you these screening questions? Yes Filed at: 01/30/2021 1708   Initial Alcohol Screen: US AUDIT-C    1  How often do you have a drink containing alcohol?  0 Filed at: 01/30/2021 1708   2  How many drinks containing alcohol do you have on a typical day you are drinking? 0 Filed at: 01/30/2021 1708   3a  Male UNDER 65: How often do you have five or more drinks on one occasion? 0 Filed at: 01/30/2021 1708   3b  FEMALE Any Age, or MALE 65+: How often do you have 4 or more drinks on one occassion? 0 Filed at: 01/30/2021 1708   Audit-C Score  0 Filed at: 01/30/2021 1708   HERACLIO: How many times in the past year have you    Used an illegal drug or used a prescription medication for non-medical reasons? Never Filed at: 01/30/2021 1708                    MDM  Number of Diagnoses or Management Options  Deliberate self-cutting: new and requires workup  Depression: new and requires workup      Disposition  Final diagnoses:   Depression   Deliberate self-cutting     Time reflects when diagnosis was documented in both MDM as applicable and the Disposition within this note     Time User Action Codes Description Comment    1/31/2021  2:59 AM Mino Silveira Add [F32 9] Depression     1/31/2021  2:59 AM Mino Silveira Add [Z72 89] Deliberate self-cutting       ED Disposition     ED Disposition Condition Date/Time Comment    Transfer to [facility] Jan 31, 2021  2:59 AM Zhanna Shultz should be transferred out to [facility] and has been medically cleared          MD Documentation      Most Recent Value   Patient Condition  The patient has been stabilized such that within reasonable medical probability, no material deterioration of the patient condition or the condition of the unborn child(kalina) is likely to result from the transfer   Reason for Transfer  Level of Care needed not available at this facility   Benefits of Transfer  Specialized equipment and/or services available at the receiving facility (Include comment)________________________   Risks of Transfer  Potential for delay in receiving treatment   Accepting Physician  Dr Alycia Avery Name, [facility], [address] , [facility] [address]    (Name & Tel number)  Latia Harrison, [phone]   Transported by [employer] and Unit #)  CTS   Sending MD Dr Isrrael Lovelace Name, [facility], [address] , [facility] [address]    (Name & Tel number)  Latia Harrison, [phone]   Transported by [employer] and Unit #)  CTS   Patient Belongings Disposition  Sent with patient   Transfer Date  01/31/21      Follow-up Information    None         Discharge Medication List as of 1/31/2021  2:41 PM      CONTINUE these medications which have NOT CHANGED    Details   glipiZIDE (GLUCOTROL XL) 5 mg 24 hr tablet Take 5 mg by mouth daily, Historical Med      metFORMIN (GLUCOPHAGE) 1000 MG tablet Take 1,000 mg by mouth 2 (two) times a day with meals, Historical Med      naproxen (NAPROSYN) 500 mg tablet Take 500 mg by mouth 2 (two) times a day with meals, Historical Med      topiramate (TOPAMAX) 100 mg tablet Take 100 mg by mouth Takes 2 tabs at bed time, Historical Med      atorvastatin (LIPITOR) 20 mg tablet Take 20 mg by mouth, Historical Med      Empagliflozin 10 MG TABS Take 10 mg by mouth, Historical Med      gabapentin (NEURONTIN) 300 mg capsule Take 300 mg by mouth, Historical Med      lamoTRIgine (LaMICtal) 100 mg tablet Take 100 mg by mouth, Historical Med      Mirabegron ER 50 MG TB24 Take 50 mg by mouth, Historical Med      ziprasidone (GEODON) 60 mg capsule Take 60 mg by mouth, Historical Med         STOP taking these medications       cephalexin (KEFLEX) 500 mg capsule Comments:   Reason for Stopping:         saccharomyces boulardii (FLORASTOR) 250 mg capsule Comments:   Reason for Stopping: No discharge procedures on file      PDMP Review     None          ED Provider  Electronically Signed by           Antoine Morrow DO  02/03/21 6694

## 2021-01-30 NOTE — ED NOTES
Pt Belongings: In duffle bag:  -5 bracelets  -1 necklace  -4 leggings  -1 pair of jeans  -sweatshirt  -5 shirts  -hairbrush  -mouthwash  -toothpaste  -sweatpants  -underwear  -1 pair of shoes (boots)  -jacket    In Purse:  -1 bottle of Cephalexin  -1 mask  -wallet  -Epi-pen (pt states, "It is my friend's epi-pen")    Belongings charted on paper and are currently hanging in patient's room   (rm 06)     Kathi Hammond  01/30/21 2667

## 2021-01-30 NOTE — LETTER
[address] [facility] [address]  [address] [facility] [address]  Dept: [phone]      EMTALA TRANSFER CONSENT    NAME Gurpreet Leon                        1990                              MRN [id number]    I have been informed of my rights regarding examination, treatment, and transfer   by Dr iMrna Hernandez: Specialized equipment and/or services available at the receiving facility (Include comment)________________________    Risks: Potential for delay in receiving treatment      Consent for Transfer:  I acknowledge that my medical condition has been evaluated and explained to me by the emergency department physician or other qualified medical person and/or my attending physician, who has recommended that I be transferred to the service of  Accepting Physician: Dr Patel Vieira at [address] [facility] [address] [address] Name, [address] : [place] [address]  The above potential benefits of such transfer, the potential risks associated with such transfer, and the probable risks of not being transferred have been explained to me, and I fully understand them  The doctor has explained that, in my case, the benefits of transfer outweigh the risks  I agree to be transferred  I authorize the performance of emergency medical procedures and treatments upon me in both transit and upon arrival at the receiving facility  Additionally, I authorize the release of any and all medical records to the receiving facility and request they be transported with me, if possible  I understand that the safest mode of transportation during a medical emergency is an ambulance and that the Hospital advocates the use of this mode of transport  Risks of traveling to the receiving facility by car, including absence of medical control, life sustaining equipment, such as oxygen, and medical personnel has been explained to me and I fully understand them      ([address]RECT [address] BELOW)  [X]  I consent to the stated transfer and to be transported by ambulance/helicopter  [  ]  I consent to the stated transfer, but refuse transportation by ambulance and accept full responsibility for my transportation by car  I understand the risks of non-ambulance transfers and I exonerate the Hospital and its staff from any deterioration in my condition that results from this refusal     X___________________________________________    DATE  21  TIME________  Signature of patient or legally responsible individual signing on patient behalf           RELATIONSHIP TO PATIENT_________________________          Provider Certification    NAME Vania Heart                       1990                              MRN [id number]    A medical screening exam was performed on the above named patient  Based on the examination:    Condition Necessitating Transfer The primary encounter diagnosis was Depression  A diagnosis of Deliberate self-cutting was also pertinent to this visit      Patient Condition: The patient has been stabilized such that within reasonable medical probability, no material deterioration of the patient condition or the condition of the unborn child(kalina) is likely to result from the transfer    Reason for Transfer: Level of Care needed not available at this facility    Transfer Requirements: [facility], [address] , [address] [address]   · Space available and qualified personnel available for treatment as acknowledged by Jose Villavicencio, [address], [phone]  · Agreed to accept transfer and to provide appropriate medical treatment as acknowledged by       Dr Gemma Allen  · Appropriate medical records of the examination and treatment of the patient are provided at the time of transfer   [address] _______  · Transfer will be performed by qualified personnel from [address] [place] [address]  and appropriate transfer equipment as required, including the use of necessary and appropriate life support measures  Provider Certification: I have examined the patient and explained the following risks and benefits of being transferred/refusing transfer to the patient/family:         Based on these reasonable risks and benefits to the patient and/or the unborn child(kalina), and based upon the information available at the time of the patients examination, I certify that the medical benefits reasonably to be expected from the provision of appropriate medical treatments at another medical facility outweigh the increasing risks, if any, to the individuals medical condition, and in the case of labor to the unborn child, from effecting the transfer      X____________________________________________ DATE 01/31/21        TIME_______      ORIGINAL - SEND TO MEDICAL RECORDS   COPY - SEND WITH PATIENT DURING TRANSFER

## 2021-01-30 NOTE — ED NOTES
Pt presents from home via EMS as a self-referral due to increased depression, anxiety attack, and cut wrist  Pt reports a hx of bipolar disorder, depression, and anxiety for many years  Pt reports a hx of IP and OP svcs  Pt states, "I think the depression is getting worse  I got scared last night which made me have a panic attack after a nightmare and my sleep hasn't been good"  Pt reports being compliant w/meds but states, "I think my Geodon requires some changes, I don't think it's working the way it used to"  Pt denies HI's and or AVH's  Pt does not endorse active SI's w/plan but cannot contract for safety outside of a hosptial environment at this time  Pt is fearful she may act on something  Pt did have a psychiatrist in [address]  Pt reports moving to the area recently and was provided w/a new psychiatrist and is currently waiting for the appointment but forgot dr's name  Pt denies legal issues, use of illegal substances and or ETOH  Pt does confirm use of tobacco products, aprox 1 pack of cigarettes daily  Pt denies any issues w/appetite  CW provided pt w/bed search placement process and pt is receptive to plan  CW provided Dr Du Garcia w/details and findings       TDS, CW

## 2021-01-30 NOTE — ED NOTES
CW notes, pt and Dr Michael Major have completed the 201 commitment  CW read and reviewed 201 rights w/pt  CW currently awaiting lab results  Once lab results are available CW will begin bed search  CW reminded pt of bed search process  Pt did not indicate any barriers to location of treatment       TDS, CW

## 2021-01-31 VITALS
TEMPERATURE: 98.3 F | DIASTOLIC BLOOD PRESSURE: 62 MMHG | RESPIRATION RATE: 18 BRPM | WEIGHT: 251.32 LBS | BODY MASS INDEX: 40.39 KG/M2 | HEART RATE: 98 BPM | HEIGHT: 66 IN | SYSTOLIC BLOOD PRESSURE: 115 MMHG | OXYGEN SATURATION: 98 %

## 2021-01-31 LAB
ATRIAL RATE: 83 BPM
GLUCOSE SERPL-MCNC: 115 MG/DL (ref 65–140)
P AXIS: 46 DEGREES
PR INTERVAL: 152 MS
QRS AXIS: 80 DEGREES
QRSD INTERVAL: 84 MS
QT INTERVAL: 368 MS
QTC INTERVAL: 432 MS
T WAVE AXIS: 64 DEGREES
VENTRICULAR RATE: 83 BPM

## 2021-01-31 PROCEDURE — 82948 REAGENT STRIP/BLOOD GLUCOSE: CPT

## 2021-01-31 PROCEDURE — 93010 ELECTROCARDIOGRAM REPORT: CPT | Performed by: INTERNAL MEDICINE

## 2021-01-31 RX ADMIN — ZIPRASIDONE HYDROCHLORIDE 60 MG: 40 CAPSULE ORAL at 11:58

## 2021-01-31 RX ADMIN — GLIPIZIDE 5 MG: 2.5 TABLET, EXTENDED RELEASE ORAL at 11:57

## 2021-01-31 RX ADMIN — Medication 250 MG: at 11:58

## 2021-01-31 RX ADMIN — TOPIRAMATE 25 MG: 25 TABLET, FILM COATED ORAL at 11:58

## 2021-01-31 NOTE — ED NOTES
[facility]IS faxed referral to [facility] for review      No beds available at:  Joel()  [facility](Gloria)  [facility](Dede)  [facility](Milagros)

## 2021-01-31 NOTE — ED NOTES
CTS arrived for Pt transport   All Pt belongings were given to Transport team      Carine Lozada  01/31/21 3587

## 2021-01-31 NOTE — ED NOTES
Patient vitals due at this time  Patient is currently sleeping  Will take vitals when patient arises       Jeb High  01/30/21 7953

## 2021-01-31 NOTE — ED NOTES
Patient is accepted at [facility]   Patient is accepted by Dr Ivone Taylor per Chrissy Chatman  Transportation is arranged with TBD  Transportation is scheduled for **  Patient may go to the floor at **  Nurse report is to be called to [phone] prior to patient transfer

## 2021-01-31 NOTE — ED NOTES
CW placed call to [facility], spoke samantha/Al  Pt was accepted by Dr Jammie Leyden and may arrive at any time  CW requested nurse to nurse report  Once pt is awake, CW will provide pt w/wireless phone for pt to speak w/toy  CW placed call to [facility], ana luisa singh/Vincenzo  As per Mare Kearns, will call back w/ETA once available  CW placed call to [facility], ana luisa singh/Александр  CW provided COB information      TDS, CW

## 2021-01-31 NOTE — ED NOTES
CW received call from [facility]  As per Jen Tucker, facility doctor is requesting pt's arrive a little later today  As per Jen Tucker perhaps after 2ish  CW placed call to [facility], spoke w/Vincenzo  As per Cisco Ceballos will inquire w/CTS and provide ETA when available, possible holds due to pending weather, but will clarify w/CTS      TDS, CW

## 2021-01-31 NOTE — ED NOTES
[facility] requested the following information which was now provided to [facility]:  Patient graduated from high school, lives with five friends, last had a seizure approximately six years ago, and was hospitalized for mental health in [address], 2015, [address], 2014 and [place], 2006/2007  CIS provided information to [facility] also requested CBC and CMP  CIS informed Provider

## 2021-01-31 NOTE — ED NOTES
CW placed call to [facility], spoke w/Namita    Insurance Authorization for admission:   Phone call placed to [facility]  Phone number: [phone]     Spoke to [facility]     ** days approved  Level of care: **   Review on **  Authorization # CW requested COB  As per [facility] to call [facility] upon pt's arrival and fax COB paperwork upon pt's discharge  EVS (Eligibility Verification System) called - [phone]  Automated system indicates: N/A    NO PRE-CERT REQUIRED FOR CTS TRANSPORT    Insurance Authorization for Transportation:    Phone call placed to **  Phone number **  Spoke to **     Authorization #: **

## 2021-02-01 NOTE — ED CARE HANDOFF
Emergency Department Sign Out Note        Sign out and transfer of care from [facility]  See Separate Emergency Department note  The patient, Mark Barillas, was evaluated by the previous provider for SI  Workup Completed:  Psychiatric work up    ED Course / Workup Pending (followup):  201 for SI  Pending placement  Patient will require a 302 if 201 rescinded  Procedures  MDM  Number of Diagnoses or Management Options  Deliberate self-cutting:   Depression:   Diagnosis management comments: Depression, SI with deliberate self cutting  Patient signed a 12 and is transferred to [facility] for further care  Disposition  Final diagnoses:   Depression   Deliberate self-cutting     Time reflects when diagnosis was documented in both MDM as applicable and the Disposition within this note     Time User Action Codes Description Comment    1/31/2021  2:59 AM Mahoney-Tesoriero, Patrcia Favre Add [F32 9] Depression     1/31/2021  2:59 AM Mahoney-Tesoriero, Patrcia Favre Add [Z72 89] Deliberate self-cutting       ED Disposition     ED Disposition Condition Date/Time Comment    Transfer to [address] [place] [address] Jan 31, 2021  2:59 AM Mark Barillas should be transferred out to [facility] and has been medically cleared          MD Documentation      Most Recent Value   Patient Condition  The patient has been stabilized such that within reasonable medical probability, no material deterioration of the patient condition or the condition of the unborn child(kalina) is likely to result from the transfer   Reason for Transfer  Level of Care needed not available at this facility   Benefits of Transfer  Specialized equipment and/or services available at the receiving facility (Include comment)________________________   Risks of Transfer  Potential for delay in receiving treatment   Accepting Physician  Dr Palomares Fees Name, [facility], [place] [address] [place] [address]    (Name & Tel number)  Latia Beasley, [phone]   Transported by [employer] and Unit #)  [facility]   Sending MD Banuelos  [address] Name, [facility], [address] [address]    (Name & Tel number)  Latia Beasley, [phone]   Transported by [employer] and Unit #)  [facility]   Patient Belongings Disposition  Sent with patient   Transfer Date  01/31/21      Follow-up Information    None       Discharge Medication List as of 1/31/2021  2:41 PM      CONTINUE these medications which have NOT CHANGED    Details   glipiZIDE (GLUCOTROL XL) 5 mg 24 hr tablet Take 5 mg by mouth daily, Historical Med      metFORMIN (GLUCOPHAGE) 1000 MG tablet Take 1,000 mg by mouth 2 (two) times a day with meals, Historical Med      naproxen (NAPROSYN) 500 mg tablet Take 500 mg by mouth 2 (two) times a day with meals, Historical Med      topiramate (TOPAMAX) 100 mg tablet Take 100 mg by mouth Takes 2 tabs at bed time, Historical Med      atorvastatin (LIPITOR) 20 mg tablet Take 20 mg by mouth, Historical Med      Empagliflozin 10 MG TABS Take 10 mg by mouth, Historical Med      gabapentin (NEURONTIN) 300 mg capsule Take 300 mg by mouth, Historical Med      lamoTRIgine (LaMICtal) 100 mg tablet Take 100 mg by mouth, Historical Med      Mirabegron ER 50 MG TB24 Take 50 mg by mouth, Historical Med      ziprasidone (GEODON) 60 mg capsule Take 60 mg by mouth, Historical Med         STOP taking these medications       cephalexin (KEFLEX) 500 mg capsule Comments:   Reason for Stopping:         saccharomyces boulardii (FLORASTOR) 250 mg capsule Comments:   Reason for Stopping:             No discharge procedures on file         ED Provider  Electronically Signed by     Luisa Sanii DO  02/01/21 [id number]

## 2021-02-18 ENCOUNTER — APPOINTMENT (EMERGENCY)
Dept: CT IMAGING | Facility: HOSPITAL | Age: 31
DRG: 854 | End: 2021-02-18
Payer: MEDICARE

## 2021-02-18 ENCOUNTER — APPOINTMENT (EMERGENCY)
Dept: RADIOLOGY | Facility: HOSPITAL | Age: 31
DRG: 854 | End: 2021-02-18
Payer: MEDICARE

## 2021-02-18 ENCOUNTER — HOSPITAL ENCOUNTER (INPATIENT)
Facility: HOSPITAL | Age: 31
LOS: 3 days | Discharge: HOME | DRG: 854 | End: 2021-02-21
Attending: EMERGENCY MEDICINE | Admitting: INTERNAL MEDICINE
Payer: MEDICARE

## 2021-02-18 DIAGNOSIS — E11.621 DIABETIC TOE ULCER (HCC): ICD-10-CM

## 2021-02-18 DIAGNOSIS — N39.0 URINARY TRACT INFECTION: ICD-10-CM

## 2021-02-18 DIAGNOSIS — L97.509 DIABETIC TOE ULCER (HCC): ICD-10-CM

## 2021-02-18 DIAGNOSIS — A41.9 SEPSIS (HCC): Primary | ICD-10-CM

## 2021-02-18 DIAGNOSIS — L03.039 CELLULITIS OF TOE, UNSPECIFIED LATERALITY: ICD-10-CM

## 2021-02-18 DIAGNOSIS — L03.031 CELLULITIS OF GREAT TOE, RIGHT: ICD-10-CM

## 2021-02-18 PROBLEM — R07.89 OTHER CHEST PAIN: Status: ACTIVE | Noted: 2021-02-18

## 2021-02-18 PROBLEM — F31.9 BIPOLAR 1 DISORDER (HCC): Status: ACTIVE | Noted: 2021-02-18

## 2021-02-18 LAB
ALBUMIN SERPL BCP-MCNC: 2.9 G/DL (ref 3.5–5)
ALP SERPL-CCNC: 125 U/L (ref 46–116)
ALT SERPL W P-5'-P-CCNC: 21 U/L (ref 12–78)
ANION GAP SERPL CALCULATED.3IONS-SCNC: 14 MMOL/L (ref 4–13)
APTT PPP: 31 SECONDS (ref 23–37)
AST SERPL W P-5'-P-CCNC: 13 U/L (ref 5–45)
BACTERIA UR QL AUTO: ABNORMAL /HPF
BASOPHILS # BLD AUTO: 0.03 THOUSANDS/ΜL (ref 0–0.1)
BASOPHILS NFR BLD AUTO: 0 % (ref 0–1)
BILIRUB SERPL-MCNC: 0.4 MG/DL (ref 0.2–1)
BILIRUB UR QL STRIP: NEGATIVE
BUN SERPL-MCNC: 7 MG/DL (ref 5–25)
CALCIUM ALBUM COR SERPL-MCNC: 10.1 MG/DL (ref 8.3–10.1)
CALCIUM SERPL-MCNC: 9.2 MG/DL (ref 8.3–10.1)
CHLORIDE SERPL-SCNC: 102 MMOL/L (ref 100–108)
CLARITY UR: CLEAR
CO2 SERPL-SCNC: 22 MMOL/L (ref 21–32)
COLOR UR: ABNORMAL
CREAT SERPL-MCNC: 1 MG/DL (ref 0.6–1.3)
CRP SERPL HS-MCNC: 110.03 MG/L
D DIMER PPP FEU-MCNC: 1.48 UG/ML FEU
EOSINOPHIL # BLD AUTO: 0.09 THOUSAND/ΜL (ref 0–0.61)
EOSINOPHIL NFR BLD AUTO: 1 % (ref 0–6)
ERYTHROCYTE [DISTWIDTH] IN BLOOD BY AUTOMATED COUNT: 12.3 % (ref 11.6–15.1)
ERYTHROCYTE [SEDIMENTATION RATE] IN BLOOD: 53 MM/HOUR (ref 0–19)
EXT PREG TEST URINE: NEGATIVE
EXT. CONTROL ED NAV: NORMAL
GFR SERPL CREATININE-BSD FRML MDRD: 76 ML/MIN/1.73SQ M
GLUCOSE SERPL-MCNC: 123 MG/DL (ref 65–140)
GLUCOSE SERPL-MCNC: 213 MG/DL (ref 65–140)
GLUCOSE SERPL-MCNC: 251 MG/DL (ref 65–140)
GLUCOSE UR STRIP-MCNC: ABNORMAL MG/DL
HCT VFR BLD AUTO: 42.6 % (ref 34.8–46.1)
HGB BLD-MCNC: 13.2 G/DL (ref 11.5–15.4)
HGB UR QL STRIP.AUTO: NEGATIVE
IMM GRANULOCYTES # BLD AUTO: 0.13 THOUSAND/UL (ref 0–0.2)
IMM GRANULOCYTES NFR BLD AUTO: 1 % (ref 0–2)
INR PPP: 1.15 (ref 0.84–1.19)
KETONES UR STRIP-MCNC: NEGATIVE MG/DL
LACTATE SERPL-SCNC: 3.4 MMOL/L (ref 0.5–2)
LACTATE SERPL-SCNC: 4.5 MMOL/L (ref 0.5–2)
LEUKOCYTE ESTERASE UR QL STRIP: ABNORMAL
LYMPHOCYTES # BLD AUTO: 1.01 THOUSANDS/ΜL (ref 0.6–4.47)
LYMPHOCYTES NFR BLD AUTO: 10 % (ref 14–44)
MCH RBC QN AUTO: 27.2 PG (ref 26.8–34.3)
MCHC RBC AUTO-ENTMCNC: 31 G/DL (ref 31.4–37.4)
MCV RBC AUTO: 88 FL (ref 82–98)
MONOCYTES # BLD AUTO: 0.59 THOUSAND/ΜL (ref 0.17–1.22)
MONOCYTES NFR BLD AUTO: 6 % (ref 4–12)
NEUTROPHILS # BLD AUTO: 8.32 THOUSANDS/ΜL (ref 1.85–7.62)
NEUTS SEG NFR BLD AUTO: 82 % (ref 43–75)
NITRITE UR QL STRIP: NEGATIVE
NON-SQ EPI CELLS URNS QL MICRO: ABNORMAL /HPF
NRBC BLD AUTO-RTO: 0 /100 WBCS
PH UR STRIP.AUTO: 8 [PH]
PLATELET # BLD AUTO: 336 THOUSANDS/UL (ref 149–390)
PMV BLD AUTO: 9 FL (ref 8.9–12.7)
POTASSIUM SERPL-SCNC: 3.6 MMOL/L (ref 3.5–5.3)
PROT SERPL-MCNC: 8.8 G/DL (ref 6.4–8.2)
PROT UR STRIP-MCNC: NEGATIVE MG/DL
PROTHROMBIN TIME: 14.9 SECONDS (ref 11.6–14.5)
RBC # BLD AUTO: 4.86 MILLION/UL (ref 3.81–5.12)
RBC #/AREA URNS AUTO: ABNORMAL /HPF
SODIUM SERPL-SCNC: 138 MMOL/L (ref 136–145)
SP GR UR STRIP.AUTO: 1.01 (ref 1–1.03)
TROPONIN I SERPL-MCNC: <0.02 NG/ML
TROPONIN I SERPL-MCNC: <0.02 NG/ML
UROBILINOGEN UR QL STRIP.AUTO: 1 E.U./DL
WBC # BLD AUTO: 10.17 THOUSAND/UL (ref 4.31–10.16)
WBC #/AREA URNS AUTO: ABNORMAL /HPF

## 2021-02-18 PROCEDURE — 80053 COMPREHEN METABOLIC PANEL: CPT | Performed by: PHYSICIAN ASSISTANT

## 2021-02-18 PROCEDURE — 84145 PROCALCITONIN (PCT): CPT | Performed by: PHYSICIAN ASSISTANT

## 2021-02-18 PROCEDURE — 83605 ASSAY OF LACTIC ACID: CPT | Performed by: NURSE PRACTITIONER

## 2021-02-18 PROCEDURE — 99223 1ST HOSP IP/OBS HIGH 75: CPT | Performed by: INTERNAL MEDICINE

## 2021-02-18 PROCEDURE — 71275 CT ANGIOGRAPHY CHEST: CPT

## 2021-02-18 PROCEDURE — 99285 EMERGENCY DEPT VISIT HI MDM: CPT

## 2021-02-18 PROCEDURE — 85379 FIBRIN DEGRADATION QUANT: CPT | Performed by: PHYSICIAN ASSISTANT

## 2021-02-18 PROCEDURE — 96368 THER/DIAG CONCURRENT INF: CPT

## 2021-02-18 PROCEDURE — 85652 RBC SED RATE AUTOMATED: CPT | Performed by: PHYSICIAN ASSISTANT

## 2021-02-18 PROCEDURE — 96365 THER/PROPH/DIAG IV INF INIT: CPT

## 2021-02-18 PROCEDURE — 84484 ASSAY OF TROPONIN QUANT: CPT | Performed by: PHYSICIAN ASSISTANT

## 2021-02-18 PROCEDURE — 81025 URINE PREGNANCY TEST: CPT | Performed by: PHYSICIAN ASSISTANT

## 2021-02-18 PROCEDURE — 82948 REAGENT STRIP/BLOOD GLUCOSE: CPT

## 2021-02-18 PROCEDURE — 85610 PROTHROMBIN TIME: CPT | Performed by: PHYSICIAN ASSISTANT

## 2021-02-18 PROCEDURE — 81001 URINALYSIS AUTO W/SCOPE: CPT | Performed by: PHYSICIAN ASSISTANT

## 2021-02-18 PROCEDURE — 84484 ASSAY OF TROPONIN QUANT: CPT | Performed by: NURSE PRACTITIONER

## 2021-02-18 PROCEDURE — 86141 C-REACTIVE PROTEIN HS: CPT | Performed by: PHYSICIAN ASSISTANT

## 2021-02-18 PROCEDURE — 93005 ELECTROCARDIOGRAM TRACING: CPT

## 2021-02-18 PROCEDURE — 83605 ASSAY OF LACTIC ACID: CPT | Performed by: PHYSICIAN ASSISTANT

## 2021-02-18 PROCEDURE — 99285 EMERGENCY DEPT VISIT HI MDM: CPT | Performed by: PHYSICIAN ASSISTANT

## 2021-02-18 PROCEDURE — 36415 COLL VENOUS BLD VENIPUNCTURE: CPT | Performed by: PHYSICIAN ASSISTANT

## 2021-02-18 PROCEDURE — 85025 COMPLETE CBC W/AUTO DIFF WBC: CPT | Performed by: PHYSICIAN ASSISTANT

## 2021-02-18 PROCEDURE — 87040 BLOOD CULTURE FOR BACTERIA: CPT | Performed by: PHYSICIAN ASSISTANT

## 2021-02-18 PROCEDURE — 96366 THER/PROPH/DIAG IV INF ADDON: CPT

## 2021-02-18 PROCEDURE — 73660 X-RAY EXAM OF TOE(S): CPT

## 2021-02-18 PROCEDURE — 71045 X-RAY EXAM CHEST 1 VIEW: CPT

## 2021-02-18 PROCEDURE — 85730 THROMBOPLASTIN TIME PARTIAL: CPT | Performed by: PHYSICIAN ASSISTANT

## 2021-02-18 RX ORDER — TOPIRAMATE 100 MG/1
200 TABLET, FILM COATED ORAL DAILY
Status: DISCONTINUED | OUTPATIENT
Start: 2021-02-19 | End: 2021-02-21 | Stop reason: HOSPADM

## 2021-02-18 RX ORDER — ACETAMINOPHEN 325 MG/1
650 TABLET ORAL EVERY 6 HOURS PRN
Status: DISCONTINUED | OUTPATIENT
Start: 2021-02-18 | End: 2021-02-21 | Stop reason: HOSPADM

## 2021-02-18 RX ORDER — CEFAZOLIN SODIUM 2 G/50ML
2000 SOLUTION INTRAVENOUS EVERY 8 HOURS
Status: DISCONTINUED | OUTPATIENT
Start: 2021-02-18 | End: 2021-02-21 | Stop reason: HOSPADM

## 2021-02-18 RX ORDER — ATORVASTATIN CALCIUM 20 MG/1
20 TABLET, FILM COATED ORAL
Status: DISCONTINUED | OUTPATIENT
Start: 2021-02-19 | End: 2021-02-21 | Stop reason: HOSPADM

## 2021-02-18 RX ORDER — DULAGLUTIDE 1.5 MG/.5ML
1.5 INJECTION, SOLUTION SUBCUTANEOUS WEEKLY
COMMUNITY

## 2021-02-18 RX ORDER — SODIUM CHLORIDE, SODIUM GLUCONATE, SODIUM ACETATE, POTASSIUM CHLORIDE, MAGNESIUM CHLORIDE, SODIUM PHOSPHATE, DIBASIC, AND POTASSIUM PHOSPHATE .53; .5; .37; .037; .03; .012; .00082 G/100ML; G/100ML; G/100ML; G/100ML; G/100ML; G/100ML; G/100ML
1000 INJECTION, SOLUTION INTRAVENOUS ONCE
Status: COMPLETED | OUTPATIENT
Start: 2021-02-18 | End: 2021-02-18

## 2021-02-18 RX ORDER — SODIUM CHLORIDE, SODIUM GLUCONATE, SODIUM ACETATE, POTASSIUM CHLORIDE, MAGNESIUM CHLORIDE, SODIUM PHOSPHATE, DIBASIC, AND POTASSIUM PHOSPHATE .53; .5; .37; .037; .03; .012; .00082 G/100ML; G/100ML; G/100ML; G/100ML; G/100ML; G/100ML; G/100ML
746 INJECTION, SOLUTION INTRAVENOUS ONCE
Status: COMPLETED | OUTPATIENT
Start: 2021-02-18 | End: 2021-02-18

## 2021-02-18 RX ORDER — ONDANSETRON 2 MG/ML
4 INJECTION INTRAMUSCULAR; INTRAVENOUS EVERY 6 HOURS PRN
Status: DISCONTINUED | OUTPATIENT
Start: 2021-02-18 | End: 2021-02-21 | Stop reason: HOSPADM

## 2021-02-18 RX ORDER — NAPROXEN 250 MG/1
500 TABLET ORAL 2 TIMES DAILY WITH MEALS
Status: DISCONTINUED | OUTPATIENT
Start: 2021-02-19 | End: 2021-02-21 | Stop reason: HOSPADM

## 2021-02-18 RX ORDER — DOCUSATE SODIUM 100 MG/1
100 CAPSULE, LIQUID FILLED ORAL 2 TIMES DAILY
Status: DISCONTINUED | OUTPATIENT
Start: 2021-02-19 | End: 2021-02-21 | Stop reason: HOSPADM

## 2021-02-18 RX ORDER — GABAPENTIN 300 MG/1
300 CAPSULE ORAL DAILY
Status: DISCONTINUED | OUTPATIENT
Start: 2021-02-19 | End: 2021-02-21 | Stop reason: HOSPADM

## 2021-02-18 RX ORDER — OXYBUTYNIN CHLORIDE 5 MG/1
10 TABLET, EXTENDED RELEASE ORAL DAILY
Status: DISCONTINUED | OUTPATIENT
Start: 2021-02-19 | End: 2021-02-21 | Stop reason: HOSPADM

## 2021-02-18 RX ORDER — MAGNESIUM HYDROXIDE/ALUMINUM HYDROXICE/SIMETHICONE 120; 1200; 1200 MG/30ML; MG/30ML; MG/30ML
30 SUSPENSION ORAL EVERY 6 HOURS PRN
Status: DISCONTINUED | OUTPATIENT
Start: 2021-02-18 | End: 2021-02-21 | Stop reason: HOSPADM

## 2021-02-18 RX ORDER — LAMOTRIGINE 100 MG/1
100 TABLET ORAL DAILY
Status: DISCONTINUED | OUTPATIENT
Start: 2021-02-19 | End: 2021-02-21 | Stop reason: HOSPADM

## 2021-02-18 RX ORDER — HEPARIN SODIUM 5000 [USP'U]/ML
5000 INJECTION, SOLUTION INTRAVENOUS; SUBCUTANEOUS EVERY 8 HOURS SCHEDULED
Status: DISCONTINUED | OUTPATIENT
Start: 2021-02-18 | End: 2021-02-21 | Stop reason: HOSPADM

## 2021-02-18 RX ADMIN — IOHEXOL 100 ML: 350 INJECTION, SOLUTION INTRAVENOUS at 17:27

## 2021-02-18 RX ADMIN — CEFEPIME HYDROCHLORIDE 2000 MG: 2 INJECTION, POWDER, FOR SOLUTION INTRAVENOUS at 17:09

## 2021-02-18 RX ADMIN — VANCOMYCIN HYDROCHLORIDE 1750 MG: 1 INJECTION, POWDER, LYOPHILIZED, FOR SOLUTION INTRAVENOUS at 17:39

## 2021-02-18 RX ADMIN — SODIUM CHLORIDE, SODIUM GLUCONATE, SODIUM ACETATE, POTASSIUM CHLORIDE, MAGNESIUM CHLORIDE, SODIUM PHOSPHATE, DIBASIC, AND POTASSIUM PHOSPHATE 746 ML: .53; .5; .37; .037; .03; .012; .00082 INJECTION, SOLUTION INTRAVENOUS at 17:39

## 2021-02-18 RX ADMIN — SODIUM CHLORIDE, SODIUM GLUCONATE, SODIUM ACETATE, POTASSIUM CHLORIDE, MAGNESIUM CHLORIDE, SODIUM PHOSPHATE, DIBASIC, AND POTASSIUM PHOSPHATE 1000 ML: .53; .5; .37; .037; .03; .012; .00082 INJECTION, SOLUTION INTRAVENOUS at 16:16

## 2021-02-18 NOTE — ASSESSMENT & PLAN NOTE
Tachycardia, leukocytosis of 10 17, lactate 4 5, in the setting of bilateral cellulitis great toes with diabetic foot ulcers  Follow-up on blood cultures  Follow-up procalcitonin trend  Repeat lactic acid  Started on IV cefepime and Vanco in the ED  Will transition to IV Ancef    Chief 2 L of IV fluids in the ED

## 2021-02-18 NOTE — ED PROVIDER NOTES
History  Chief Complaint   Patient presents with    Toe Pain     Pt presents via EMS with toe pain, also reports chest pain in center of chest     Abby Chaudhary is a 77-year-old female with history of morbid obesity, diabetes, bipolar disorder, epilepsy arriving to the emergency department accompanied by her friend for evaluation of bilateral great toe ulcers  The patient had previously been evaluated in the emergency department for this complaint on 1/19/21 at which time she was found to have a lactic acidosis prompting admission for IV antibiotics  Upon discharge the patient was prescribed oral antibiotics; however, the patient's friend states that the patient had been admitted at a psychiatric facility at which time she did not continue with the oral antibiotic course as prescribed ultimately not finishing the course as directed  She also does not receive formal wound care, and she has not followed up with a podiatrist since her previous hospital admission  She has not appreciated any discharge or drainage from the ulcers  The patient admits that she has been experiencing chills over the past couple of days but has not taken her temperature or has documented fevers  The patient admits to compliance with her anti-hyperglycemic medications though states she has not been checking her blood glucose at home  She additionally reports complaint of non-radiating central chest pain beginning this morning around 11:00 a m  While lying down  The patient relates that this was associated with sweats and shortness of breath  She states that her pain has been constant since onset and she is still short of breath  She is unable to identify exacerbated or relieving factors noting that her symptoms are not changed or worsened by exertion, movement, or positioning  She denies any calf pain or swelling  She has no personal or family history of DVT/PE    The patient was a previous 1 pack per day smoker though admits that she had quit smoking last week  She denies significant personal or family cardiac history  History provided by:  Patient and friend  Chest Pain  Pain location:  Substernal area  Pain quality: tightness    Pain radiates to:  Does not radiate  Pain radiates to the back: no    Pain severity:  Mild  Onset quality:  Sudden  Timing:  Constant  Chronicity:  New  Relieved by:  None tried  Worsened by:  Nothing tried  Ineffective treatments:  None tried  Associated symptoms: no abdominal pain, no cough, no diaphoresis, no fatigue, no fever, no nausea, no numbness, no palpitations, no shortness of breath, not vomiting and no weakness        Prior to Admission Medications   Prescriptions Last Dose Informant Patient Reported? Taking?    Dulaglutide (Trulicity) 1 5 LE/2 3EJ SOPN  Self Yes Yes   Sig: Inject 1 5 mg under the skin once a week   Empagliflozin 10 MG TABS   Yes No   Sig: Take 10 mg by mouth   Mirabegron ER 50 MG TB24   Yes No   Sig: Take 50 mg by mouth   atorvastatin (LIPITOR) 20 mg tablet   Yes No   Sig: Take 20 mg by mouth   gabapentin (NEURONTIN) 300 mg capsule   Yes No   Sig: Take 300 mg by mouth   glipiZIDE (GLUCOTROL XL) 5 mg 24 hr tablet   Yes No   Sig: Take 5 mg by mouth daily   lamoTRIgine (LaMICtal) 100 mg tablet   Yes No   Sig: Take 100 mg by mouth   metFORMIN (GLUCOPHAGE) 1000 MG tablet   Yes No   Sig: Take 1,000 mg by mouth 2 (two) times a day with meals   naproxen (NAPROSYN) 500 mg tablet   Yes No   Sig: Take 500 mg by mouth 2 (two) times a day with meals   topiramate (TOPAMAX) 100 mg tablet   Yes No   Sig: Take 100 mg by mouth Takes 2 tabs at bed time   ziprasidone (GEODON) 60 mg capsule Not Taking at Unknown time  Yes No   Sig: Take 60 mg by mouth      Facility-Administered Medications: None         Past Medical History:   Diagnosis Date    Anxiety     [facility] [address] [facility]     Depression     [facility]     Epilepsy [facility]     Psychiatric disorder        Past Surgical History: Procedure Laterality Date    OTHER SURGICAL HISTORY      Patient states she has surgery on "ear tubes", and bone spur in R hand, and 2nd toe on left foot    TONSILLECTOMY         History reviewed  No pertinent family history  I have reviewed and agree with the history as documented  E-Cigarette/Vaping     E-Cigarette/Vaping Substances    Nicotine No      Social History     Tobacco Use    Smoking status: Former Smoker     Quit date: 2020     Years since quittin 1    Smokeless tobacco: Never Used    Tobacco comment: Pt reports smoking aprox 1 pk of cigarettes daily   Substance Use Topics    Alcohol use: Never     Frequency: Never    Drug use: Never       Review of Systems   Constitutional: Positive for chills  Negative for diaphoresis, fatigue and fever  Respiratory: Negative for cough and shortness of breath  Cardiovascular: Positive for chest pain  Negative for palpitations and leg swelling  Gastrointestinal: Negative for abdominal pain, nausea and vomiting  Musculoskeletal: Negative for neck pain and neck stiffness  Skin: Positive for wound  Diabetic foot ulcers   Neurological: Negative for weakness, light-headedness and numbness  All other systems reviewed and are negative  Physical Exam  Physical Exam  Vitals signs and nursing note reviewed  Constitutional:       General: She is not in acute distress  Appearance: Normal appearance  She is well-developed  She is obese  She is not ill-appearing, toxic-appearing or diaphoretic  HENT:      Head: Normocephalic and atraumatic  Eyes:      Conjunctiva/sclera: Conjunctivae normal    Neck:      Musculoskeletal: Normal range of motion and neck supple  Cardiovascular:      Rate and Rhythm: Regular rhythm  Tachycardia present  Heart sounds: Normal heart sounds  Pulmonary:      Effort: Pulmonary effort is normal  No respiratory distress  Breath sounds: Normal breath sounds  No wheezing     Abdominal: General: Bowel sounds are normal  There is no distension  Palpations: Abdomen is soft  Tenderness: There is no abdominal tenderness  Musculoskeletal: Normal range of motion  Skin:     General: Skin is warm and dry  Capillary Refill: Capillary refill takes less than 2 seconds  Comments: There is extensive cellulitis involving the right great toe  Bilateral diabetic foot ulcers involving the plantar surface of the digit pads with necrotic soft tissue  No purulent discharge or active drainage  No blistering or hemorrhagic bullae, no palpable crepitus  No streaking  Neurological:      Mental Status: She is alert  Sensory: Sensation is intact  No sensory deficit  Motor: Motor function is intact  No weakness                             Vital Signs  ED Triage Vitals   Temperature Pulse Respirations Blood Pressure SpO2   02/18/21 1445 02/18/21 1442 02/18/21 1442 02/18/21 1442 02/18/21 1442   99 °F (37 2 °C) (!) 125 18 132/74 95 %      Temp src Heart Rate Source Patient Position - Orthostatic VS BP Location FiO2 (%)   -- 02/18/21 1442 02/18/21 1442 02/18/21 1442 --    Monitor Lying Right arm       Pain Score       02/18/21 1442       Worst Possible Pain           Vitals:    02/18/21 1442 02/18/21 1630 02/18/21 1745   BP: 132/74 142/62 119/58   Pulse: (!) 125 (!) 130 (!) 116   Patient Position - Orthostatic VS: Lying Lying Lying         Visual Acuity      ED Medications  Medications   vancomycin (VANCOCIN) 1,750 mg in sodium chloride 0 9 % 500 mL IVPB (1,750 mg Intravenous New Bag 2/18/21 1739)   multi-electrolyte (ISOLYTE-S PH 7 4) bolus 746 mL (746 mL Intravenous New Bag 2/18/21 1739)   multi-electrolyte (ISOLYTE-S PH 7 4) bolus 1,000 mL (0 mL Intravenous Stopped 2/18/21 1745)   cefepime (MAXIPIME) 2,000 mg in dextrose 5 % 50 mL IVPB (0 mg Intravenous Stopped 2/18/21 1745)   iohexol (OMNIPAQUE) 350 MG/ML injection (MULTI-DOSE) 100 mL (100 mL Intravenous Given 2/18/21 1727) Diagnostic Studies  Results Reviewed     Procedure Component Value Units Date/Time    High sensitivity CRP [[id number]] Collected: 02/18/21 1608    Lab Status: Final result Specimen: Blood from Arm, Left Updated: 02/18/21 1736     CRP, High Sensitivity 110 03 mg/L     Narrative:            HsCRP Level       Relative Risk           <1 0 mg/L          Low           1 0 to 3 0 mg/L    Average           >3 0 mg/L          High        Urine Microscopic [[id number]]  (Abnormal) Collected: 02/18/21 1629    Lab Status: Final result Specimen: Urine, Other Updated: 02/18/21 1658     RBC, UA None Seen /hpf      WBC, UA 0-1 /hpf      Epithelial Cells Moderate /hpf      Bacteria, UA Moderate /hpf     Lactic acid [[id number]]  (Abnormal) Collected: 02/18/21 1608    Lab Status: Final result Specimen: Blood from Arm, Left Updated: 02/18/21 1646     LACTIC ACID 4 5 mmol/L     Narrative:      Result may be elevated if tourniquet was used during collection  Lactic acid 2 Hours [[id number]]     Lab Status: No result Specimen: Blood     D-dimer, quantitative [[id number]]  (Abnormal) Collected: 02/18/21 1608    Lab Status: Final result Specimen: Blood from Arm, Left Updated: 02/18/21 1645     D-Dimer, Quant 1 48 ug/ml FEU     Protime-INR [[id number]]  (Abnormal) Collected: 02/18/21 1608    Lab Status: Final result Specimen: Blood from Arm, Left Updated: 02/18/21 1644     Protime 14 9 seconds      INR 1 15    APTT [[id number]]  (Normal) Collected: 02/18/21 1608    Lab Status: Final result Specimen: Blood from Arm, Left Updated: 02/18/21 1644     PTT 31 seconds     UA w Reflex to Microscopic w Reflex to Culture [[id number]]  (Abnormal) Collected: 02/18/21 1629    Lab Status: Final result Specimen: Urine, Other Updated: 02/18/21 1639     Color, UA Light Yellow     Clarity, UA Clear     Specific Gravity, UA 1 015     pH, UA 8 0     Leukocytes, UA Elevated glucose may cause decreased leukocyte values   See urine microscopic for [facility] result/ Nitrite, UA Negative     Protein, UA Negative mg/dl      Glucose, UA >=1000 (1%) mg/dl      Ketones, UA Negative mg/dl      Urobilinogen, UA 1 0 E U /dl      Bilirubin, UA Negative     Blood, UA Negative    Troponin I [[id number]]  (Normal) Collected: 02/18/21 1608    Lab Status: Final result Specimen: Blood from Arm, Left Updated: 02/18/21 1638     Troponin I <0 02 ng/mL     Comprehensive metabolic panel [[id number]]  (Abnormal) Collected: 02/18/21 1608    Lab Status: Final result Specimen: Blood from Arm, Left Updated: 02/18/21 1637     Sodium 138 mmol/L      Potassium 3 6 mmol/L      Chloride 102 mmol/L      CO2 22 mmol/L      ANION GAP 14 mmol/L      BUN 7 mg/dL      Creatinine 1 00 mg/dL      Glucose 251 mg/dL      Calcium 9 2 mg/dL      Corrected Calcium 10 1 mg/dL      AST 13 U/L      ALT 21 U/L      Alkaline Phosphatase 125 U/L      Total Protein 8 8 g/dL      Albumin 2 9 g/dL      Total Bilirubin 0 40 mg/dL      eGFR 76 ml/min/1 73sq m     Narrative:      [facility] guidelines for Chronic Kidney Disease (CKD):     Stage 1 with normal or high GFR (GFR > 90 mL/min/1 73 square meters)    Stage 2 Mild CKD (GFR = 60-89 mL/min/1 73 square meters)    Stage 3A Moderate CKD (GFR = 45-59 mL/min/1 73 square meters)    Stage 3B Moderate CKD (GFR = 30-44 mL/min/1 73 square meters)    Stage 4 Severe CKD (GFR = 15-29 mL/min/1 73 square meters)    Stage 5 End Stage CKD (GFR <15 mL/min/1 73 square meters)  Note: GFR calculation is accurate only with a steady state creatinine    POCT pregnancy, urine [[id number]]  (Normal) Resulted: 02/18/21 1631    Lab Status: Final result Updated: 02/18/21 1631     EXT PREG TEST UR (Ref: Negative) Negative     Control Valid    Sedimentation rate, automated [[id number]]  (Abnormal) Collected: 02/18/21 1608    Lab Status: Final result Specimen: Blood from Arm, Left Updated: 02/18/21 1625     Sed Rate 53 mm/hour     Narrative:      New method- Test performed using  automated Rheology Technology  If following a patient's inflammatory disease during treatment, a new baseline should be established  CBC and differential [[id number]]  (Abnormal) Collected: 02/18/21 1608    Lab Status: Final result Specimen: Blood from Arm, Left Updated: 02/18/21 1623     WBC 10 17 Thousand/uL      RBC 4 86 Million/uL      Hemoglobin 13 2 g/dL      Hematocrit 42 6 %      MCV 88 fL      MCH 27 2 pg      MCHC 31 0 g/dL      RDW 12 3 %      MPV 9 0 fL      Platelets 794 Thousands/uL      nRBC 0 /100 WBCs      Neutrophils Relative 82 %      Immat GRANS % 1 %      Lymphocytes Relative 10 %      Monocytes Relative 6 %      Eosinophils Relative 1 %      Basophils Relative 0 %      Neutrophils Absolute 8 32 Thousands/µL      Immature Grans Absolute 0 13 Thousand/uL      Lymphocytes Absolute 1 01 Thousands/µL      Monocytes Absolute 0 59 Thousand/µL      Eosinophils Absolute 0 09 Thousand/µL      Basophils Absolute 0 03 Thousands/µL     Procalcitonin with AM Reflex [[id number]] Collected: 02/18/21 1608    Lab Status: In process Specimen: Blood from Arm, Left Updated: 02/18/21 1617    Blood culture #1 [[id number]] Collected: 02/18/21 1608    Lab Status: In process Specimen: Blood from Arm, Left Updated: 02/18/21 1616    Blood culture #2 [[id number]] Collected: 02/18/21 1608    Lab Status: In process Specimen: Blood from Arm, Left Updated: 02/18/21 1616    Fingerstick Glucose (POCT) [[id number]]  (Abnormal) Collected: 02/18/21 1528    Lab Status: Final result Updated: 02/18/21 1529     POC Glucose 213 mg/dl                  CTA ED chest PE Study   Final Result by Jaycee Butts MD (02/18 1742)      No pulmonary arterial embolism  Enlarged pulmonary artery which may indicate pulmonary arterial hypertension  No pulmonary infiltrate or consolidation              Workstation performed: [id number]         XR chest 1 view portable   Final Result by Nanette Denton MD (02/18 1611)      No active pulmonary disease  Workstation performed: [id number]         XR toe great min 2 views LEFT   Final Result by Bettye Najjar, MD (02/18 1610)      No radiographic evidence of osteomyelitis  Findings compatible with first toe ulcer, plantar medial aspect  Workstation performed: [id number]         XR toe great min 2 views RIGHT   Final Result by Bettye Najjar, MD (02/18 1613)      No radiographic evidence of osteomyelitis  Workstation performed: [id number]                    Procedures  Procedures         ED Course  ED Course as of Feb 18 1828   u Feb 18, 2021   1656 Sepsis alert called  Will resuscitate with Isolyte 30cc/kg IBW      1745 SLIM paged for admission      1825 Dr Raghu Cabello accepts patient for admission                                SBIRT 22yo+      Most Recent Value   SBIRT (23 yo +)   In order to provide better care to our patients, we are screening all of our patients for alcohol and drug use  Would it be okay to ask you these screening questions? Yes Filed at: 02/18/2021 1503   Initial Alcohol Screen: US AUDIT-C    1  How often do you have a drink containing alcohol?  0 Filed at: 02/18/2021 1503   2  How many drinks containing alcohol do you have on a typical day you are drinking? 0 Filed at: 02/18/2021 1503   3a  Male UNDER 65: How often do you have five or more drinks on one occasion? 0 Filed at: 02/18/2021 1503   3b  FEMALE Any Age, or MALE 65+: How often do you have 4 or more drinks on one occassion? 0 Filed at: 02/18/2021 1503   Audit-C Score  0 Filed at: 02/18/2021 1503   HERACLIO: How many times in the past year have you    Used an illegal drug or used a prescription medication for non-medical reasons?   Never Filed at: 02/18/2021 1503          Wells' Criteria for PE      Most Recent Value   Wells' Criteria for PE   Clinical signs and symptoms of DVT  0 Filed at: 02/18/2021 1714   PE is primary diagnosis or equally likely  3 Filed at: 02/18/2021 1714 HR >100  1 5 Filed at: 02/18/2021 1714   Immobilization at least 3 days or Surgery in the previous 4 weeks  0 Filed at: 02/18/2021 1714   Previous, objectively diagnosed PE or DVT  0 Filed at: 02/18/2021 1714   Hemoptysis  0 Filed at: 02/18/2021 1714   Malignancy with treatment within 6 months or palliative  0 Filed at: 02/18/2021 1714   Wells' Criteria Total  4 5 Filed at: 02/18/2021 1714                MDM  Number of Diagnoses or Management Options  Cellulitis of great toe, right: new and requires workup  Diabetic toe ulcer ([facility] [address] ): new and requires workup  Sepsis [facility]):   Urinary tract infection:   Diagnosis management comments: This is a 77-year-old female with history of obesity and diabetes, bipolar disorder arriving to the emergency department for evaluation of bilateral toe ulcers  The patient had previously been evaluated for this complaint in the emergency department in January at which time she had been admitted, seen by Podiatry, and discharged on antibiotic course  The patient's friend states that the patient afterward had an inpatient psychiatric admission at which time she was unable to complete the antibiotic course as directed  The patient's states that she noticed worsening of her symptoms over the past 3-4 days  She states that she has not appreciated any drainage or discharge  She reports chills over the past 2 days but has not formally checked her temperature  The patient additionally relates that she had developed central chest discomfort this morning while she was lying down, admitting that this is associated with subjective dyspnea  There are no identifiable exacerbating or relieving factors, with the patient noting that her symptoms are not changed or worsened in relation to movement, positioning, or exertion  She denies significant personal or family cardiac history, or history of DVT/PE  She had recently quit smoking tobacco and denies other substance use       Differential diagnosis includes but not limited to:  Diabetic foot ulcers, cellulitis, osteomyelitis, sepsis, dehydration, metabolic derangement, electrolyte disturbance, ACS rule out, PE rule out, arrhythmia, myocarditis, costochondritis, pleurisy, msk pain, gerd, stress reaction      Initial ED plan:  Sepsis workup, x-ray bilateral great toes    Final ED Assessment:  Vital signs on hospital arrival notable for tachycardia, examination as documented above  The patient's diabetic foot ulcers now appear to be infected  All labs and imaging independently reviewed with imaging interpreted by the radiologist   X-rays negative for evidence of osteomyelitis per official reads  D-dimer positive  CTA PE study negative for acute pulmonary embolism  Patient also noted to have a urinary tract infection  Initial lactate 4 5 for which a sepsis alert was initiated  Patient given 30 cc/kg fluids IBW, and antibiotics initiated in the emergency department  The patient had been updated of all lab and imaging findings with plan for hospital admission which she is understanding and agreeable with  The case was discussed with Dr Anny Medeiros, who accepts patient for hospital admission  The patient had been monitored in the emergency department for nearly 3 hours without new or worsening symptoms, remaining hemodynamically stable  She is stable for hospital admission           Amount and/or Complexity of Data Reviewed  Clinical lab tests: ordered and reviewed  Tests in the radiology section of CPT®: ordered and reviewed  Review and summarize past medical records: yes  Independent visualization of images, tracings, or specimens: yes    Risk of Complications, Morbidity, and/or Mortality  Presenting problems: moderate  Diagnostic procedures: moderate  Management options: moderate    Patient Progress  Patient progress: stable      Disposition  Final diagnoses:   Sepsis ([address] )   Diabetic toe ulcer ([facility] [address] ) - Left great toe and right great toe Urinary tract infection   Cellulitis of great toe, right     Time reflects when diagnosis was documented in both MDM as applicable and the Disposition within this note     Time User Action Codes Description Comment    2/18/2021  6:03 PM Vandana Blackwater Add [A41 9] Sepsis ([facility] [address] )     2/18/2021  6:03 PM Vandana Blackwater Add [V78 897,  V00 818] Di[facility] toe ulcer ([facility]     2/18/2021  6:03 PM Vandana Blackwater Modify [L65 572,  L97 509] Diabetic toe ulcer ([facility] [address] ) Left great toe and right great toe    2/18/2021  6:03 PM Vandana Blackwater Add [N39 0] Urinary tract infection     2/18/2021  6:09 PM Vandana Blackwater Add [F72 225] Cellulitis of great toe, right       ED Disposition     ED Disposition Condition Date/Time Comment    Admit Stable Thu Feb 18, 2021  6:02 PM Case was discussed with Dr Darry Kocher and the patient's admission status was agreed to be Admission Status: inpatient status to the service of Dr Darry Kocher   Follow-up Information    None         Patient's Medications   Discharge Prescriptions    No medications on file     No discharge procedures on file      PDMP Review     None          ED Provider  Electronically Signed by           Travon Astudillo PA-C  02/21/21 6577

## 2021-02-18 NOTE — ASSESSMENT & PLAN NOTE
Bilateral great toe diabetic ulcers on the plantar surface digit pads with surrounding erythema of the right great toe     X-ray showed no indication of osteomyelitis  Podiatry consulted  Started on Ancef IV  Local wound care  Symptom management  Previously hospitalized in January for the same problem discharged with antibiotics however patient did not complete course

## 2021-02-18 NOTE — ASSESSMENT & PLAN NOTE
Lab Results   Component Value Date    HGBA1C 6 2 (H) 01/21/2021       Recent Labs     02/18/21  1528   POCGLU 213*       Blood Sugar Average: Last 72 hrs:  (P) 213   Holding home oral anti hyperglycemic medications  Sliding scale  Carb controlled diet  Fingersticks  Hypoglycemia protocol

## 2021-02-18 NOTE — ASSESSMENT & PLAN NOTE
Lab Results   Component Value Date    HGBA1C 6 2 (H) 01/21/2021       Recent Labs     02/18/21  1528   POCGLU 213*       Blood Sugar Average: Last 72 hrs:  (P) 213   Previously hospitalized for diabetic foot ulcers  Sent home with oral antibiotics  Patient did not take antibiotics to completion when she was inpatient psych for bipolar disorder  Bilateral great toe diabetic ulcers with surrounding cellulitis of the R great toe     Consult podiatry

## 2021-02-19 LAB
ANION GAP SERPL CALCULATED.3IONS-SCNC: 11 MMOL/L (ref 4–13)
ATRIAL RATE: 109 BPM
ATRIAL RATE: 109 BPM
ATRIAL RATE: 61 BPM
BUN SERPL-MCNC: 8 MG/DL (ref 5–25)
CALCIUM SERPL-MCNC: 8 MG/DL (ref 8.3–10.1)
CHLORIDE SERPL-SCNC: 103 MMOL/L (ref 100–108)
CO2 SERPL-SCNC: 22 MMOL/L (ref 21–32)
CREAT SERPL-MCNC: 0.84 MG/DL (ref 0.6–1.3)
ERYTHROCYTE [DISTWIDTH] IN BLOOD BY AUTOMATED COUNT: 12.4 % (ref 11.6–15.1)
GFR SERPL CREATININE-BSD FRML MDRD: 94 ML/MIN/1.73SQ M
GLUCOSE SERPL-MCNC: 151 MG/DL (ref 65–140)
GLUCOSE SERPL-MCNC: 156 MG/DL (ref 65–140)
GLUCOSE SERPL-MCNC: 156 MG/DL (ref 65–140)
GLUCOSE SERPL-MCNC: 161 MG/DL (ref 65–140)
GLUCOSE SERPL-MCNC: 172 MG/DL (ref 65–140)
GLUCOSE SERPL-MCNC: 210 MG/DL (ref 65–140)
HCT VFR BLD AUTO: 34.7 % (ref 34.8–46.1)
HGB BLD-MCNC: 10.9 G/DL (ref 11.5–15.4)
LACTATE SERPL-SCNC: 1.2 MMOL/L (ref 0.5–2)
MCH RBC QN AUTO: 27.5 PG (ref 26.8–34.3)
MCHC RBC AUTO-ENTMCNC: 31.4 G/DL (ref 31.4–37.4)
MCV RBC AUTO: 87 FL (ref 82–98)
P AXIS: 36 DEGREES
P AXIS: 37 DEGREES
PLATELET # BLD AUTO: 240 THOUSANDS/UL (ref 149–390)
PMV BLD AUTO: 9.1 FL (ref 8.9–12.7)
POTASSIUM SERPL-SCNC: 2.9 MMOL/L (ref 3.5–5.3)
PR INTERVAL: 162 MS
PR INTERVAL: 164 MS
PROCALCITONIN SERPL-MCNC: 0.09 NG/ML
PROCALCITONIN SERPL-MCNC: <0.05 NG/ML
QRS AXIS: 11 DEGREES
QRS AXIS: 62 DEGREES
QRS AXIS: 63 DEGREES
QRSD INTERVAL: 84 MS
QRSD INTERVAL: 86 MS
QRSD INTERVAL: 90 MS
QT INTERVAL: 326 MS
QT INTERVAL: 326 MS
QT INTERVAL: 430 MS
QTC INTERVAL: 439 MS
QTC INTERVAL: 439 MS
QTC INTERVAL: 615 MS
RBC # BLD AUTO: 3.97 MILLION/UL (ref 3.81–5.12)
SODIUM SERPL-SCNC: 136 MMOL/L (ref 136–145)
T WAVE AXIS: -2 DEGREES
T WAVE AXIS: 39 DEGREES
T WAVE AXIS: 43 DEGREES
TROPONIN I SERPL-MCNC: <0.02 NG/ML
VENTRICULAR RATE: 109 BPM
VENTRICULAR RATE: 109 BPM
VENTRICULAR RATE: 123 BPM
WBC # BLD AUTO: 6.72 THOUSAND/UL (ref 4.31–10.16)

## 2021-02-19 PROCEDURE — 82948 REAGENT STRIP/BLOOD GLUCOSE: CPT

## 2021-02-19 PROCEDURE — 80048 BASIC METABOLIC PNL TOTAL CA: CPT | Performed by: NURSE PRACTITIONER

## 2021-02-19 PROCEDURE — 87186 SC STD MICRODIL/AGAR DIL: CPT | Performed by: NURSE PRACTITIONER

## 2021-02-19 PROCEDURE — 87147 CULTURE TYPE IMMUNOLOGIC: CPT | Performed by: NURSE PRACTITIONER

## 2021-02-19 PROCEDURE — 87205 SMEAR GRAM STAIN: CPT | Performed by: NURSE PRACTITIONER

## 2021-02-19 PROCEDURE — 87075 CULTR BACTERIA EXCEPT BLOOD: CPT | Performed by: NURSE PRACTITIONER

## 2021-02-19 PROCEDURE — 93010 ELECTROCARDIOGRAM REPORT: CPT | Performed by: INTERNAL MEDICINE

## 2021-02-19 PROCEDURE — 87070 CULTURE OTHR SPECIMN AEROBIC: CPT | Performed by: NURSE PRACTITIONER

## 2021-02-19 PROCEDURE — 0JBQ0ZZ EXCISION OF RIGHT FOOT SUBCUTANEOUS TISSUE AND FASCIA, OPEN APPROACH: ICD-10-PCS | Performed by: PODIATRIST

## 2021-02-19 PROCEDURE — 84145 PROCALCITONIN (PCT): CPT | Performed by: PHYSICIAN ASSISTANT

## 2021-02-19 PROCEDURE — 99232 SBSQ HOSP IP/OBS MODERATE 35: CPT | Performed by: NURSE PRACTITIONER

## 2021-02-19 PROCEDURE — 85027 COMPLETE CBC AUTOMATED: CPT | Performed by: NURSE PRACTITIONER

## 2021-02-19 RX ORDER — OXYCODONE HYDROCHLORIDE 5 MG/1
5 TABLET ORAL EVERY 4 HOURS PRN
Status: DISCONTINUED | OUTPATIENT
Start: 2021-02-19 | End: 2021-02-21 | Stop reason: HOSPADM

## 2021-02-19 RX ORDER — POTASSIUM CHLORIDE 20 MEQ/1
40 TABLET, EXTENDED RELEASE ORAL
Status: COMPLETED | OUTPATIENT
Start: 2021-02-19 | End: 2021-02-20

## 2021-02-19 RX ADMIN — POTASSIUM CHLORIDE 40 MEQ: 1500 TABLET, EXTENDED RELEASE ORAL at 16:40

## 2021-02-19 RX ADMIN — TOPIRAMATE 200 MG: 100 TABLET ORAL at 09:20

## 2021-02-19 RX ADMIN — CEFAZOLIN SODIUM 2000 MG: 2 SOLUTION INTRAVENOUS at 16:41

## 2021-02-19 RX ADMIN — OXYCODONE HYDROCHLORIDE 5 MG: 5 TABLET ORAL at 01:05

## 2021-02-19 RX ADMIN — ATORVASTATIN CALCIUM 20 MG: 20 TABLET, FILM COATED ORAL at 16:40

## 2021-02-19 RX ADMIN — POTASSIUM CHLORIDE 40 MEQ: 1500 TABLET, EXTENDED RELEASE ORAL at 13:10

## 2021-02-19 RX ADMIN — CEFAZOLIN SODIUM 2000 MG: 2 SOLUTION INTRAVENOUS at 09:21

## 2021-02-19 RX ADMIN — CEFAZOLIN SODIUM 2000 MG: 2 SOLUTION INTRAVENOUS at 01:06

## 2021-02-19 RX ADMIN — INSULIN LISPRO 2 UNITS: 100 INJECTION, SOLUTION INTRAVENOUS; SUBCUTANEOUS at 18:37

## 2021-02-19 RX ADMIN — OXYBUTYNIN 10 MG: 5 TABLET, FILM COATED, EXTENDED RELEASE ORAL at 09:20

## 2021-02-19 RX ADMIN — GABAPENTIN 300 MG: 300 CAPSULE ORAL at 09:20

## 2021-02-19 RX ADMIN — NAPROXEN 500 MG: 250 TABLET ORAL at 09:21

## 2021-02-19 RX ADMIN — INSULIN LISPRO 2 UNITS: 100 INJECTION, SOLUTION INTRAVENOUS; SUBCUTANEOUS at 22:22

## 2021-02-19 RX ADMIN — NAPROXEN 500 MG: 250 TABLET ORAL at 16:40

## 2021-02-19 RX ADMIN — ACETAMINOPHEN 650 MG: 325 TABLET ORAL at 01:06

## 2021-02-19 RX ADMIN — LAMOTRIGINE 100 MG: 100 TABLET ORAL at 09:20

## 2021-02-19 RX ADMIN — INSULIN LISPRO 2 UNITS: 100 INJECTION, SOLUTION INTRAVENOUS; SUBCUTANEOUS at 13:11

## 2021-02-19 NOTE — PLAN OF CARE
Problem: Potential for Falls  Goal: Patient will remain free of falls  Description: INTERVENTIONS:  - Assess patient frequently for physical needs  -  Identify cognitive and physical deficits and behaviors that affect risk of falls  -  [place] fall precautions as indicated by assessment   - Educate patient/family on patient safety including physical limitations  - Instruct patient to call for assistance with activity based on assessment  - Modify environment to reduce risk of injury  - Consider OT/PT consult to assist with strengthening/mobility  Outcome: Progressing     Problem: PAIN - ADULT  Goal: Verbalizes/displays adequate comfort level or baseline comfort level  Description: Interventions:  - Encourage patient to monitor pain and request assistance  - Assess pain using appropriate pain scale  - Administer analgesics based on type and severity of pain and evaluate response  - Implement non-pharmacological measures as appropriate and evaluate response  - Consider cultural and social influences on pain and pain management  - Notify physician/advanced practitioner if interventions unsuccessful or patient reports new pain  Outcome: Progressing     Problem: SAFETY ADULT  Goal: Patient will remain free of falls  Description: INTERVENTIONS:  - Assess patient frequently for physical needs  -  Identify cognitive and physical deficits and behaviors that affect risk of falls    -  [place] fall precautions as indicated by assessment   - Educate patient/family on patient safety including physical limitations  - Instruct patient to call for assistance with activity based on assessment  - Modify environment to reduce risk of injury  - Consider OT/PT consult to assist with strengthening/mobility  Outcome: Progressing

## 2021-02-19 NOTE — ASSESSMENT & PLAN NOTE
Patient complains of some intermittent chest tightness, chest pain, in shortness of breath    She was previously maintained in a psychiatric facility related to anxiety and depression  Troponin initially negative, will continue to trend  Chest x-ray unremarkable  No indication for further cardiac workup at this time  Chest CT negative for PE

## 2021-02-19 NOTE — CONSULTS
Consult - Cheryl Andrews 27 y o  female MRN: [id number]  Unit/Bed#: -01 Encounter: [id number]    Assessment/Plan     Assessment:  Diabetes with peripheral neuropathy  Infected diabetic ulcers both great toe  Plan:  Reviewed chart, labs, and imaging  Leukocytosis is improving  X-ray show no sign of osteomyelitis  Reviewed pathophysiology and treatment of this condition with the patient  Ulcers were debrided cleansed and dressed  Swabs were taken for culture and sensitivity  Excisional debridement of epidermis dermis subcutaneous tissue and fat and necrotic soft tissues and slough  Scalpel and scissors were used  Right hallux ulcer approximately 3 0 cm x 2 5 cm times 0 3 cm deep  Left hallux ulcer approximately 2 5 cm x 2 0 cm x 0 3 cm deep  Nursing instructions were given for daily wound care  Present care should be supportive/symptomatic  Patient is on IV antibiotics  General diabetic skin care precautions should be observed  Additional diagnostic modalities or changes in the treatment regimen will be predicated by the clinical course and appearance and results of labs  The patient was seen only a month ago for this condition but she did not follow up in the podiatry office  She was advised  The patient was advised of the importance of following her general medical regimen and her podiatric regimen including follow-up for these ulcers  She was advised if these wounds persist or worsen or if there is any infection which penetrates to bone that there could be a more significant debridements or even amputation(s) in the operating room    History of Present Illness     HPI:  Tracy Hatfield is a 27 y o  female who presents with bilateral infected diabetic ulcers in both big toes    The patient was seen for the same condition about a month ago in this hospital   She was apparently being cared for in a psychiatric facility and did not follow up for necessary wound care     Consults  Review of Systems   Constitutional:  Patient reports fevers and chills and nausea at home  Felt achy all over  Feeling a lot better today  HENT:  Per admit H&P  Eyes:  Per admit H&P  Respiratory:  Per admit H&P  Cardiovascular:  Per admit H&P  Gastrointestinal:  Per admit H&P  Musculoskeletal:  Negative   Skin:  Previous diabetic skin ulcerations and infections   Neurological:  Distal diabetic peripheral sensory neuropathy  Psych:  History of bipolar affective disorder  Historical Information   Past Medical History:   Diagnosis Date    Anxiety     Bipolar disorder ([place] [address] )     Depression     Diabetes mellitus ([place] [address] )     Epilepsy ([place] [address] )     Psychiatric disorder      Past Surgical History:   Procedure Laterality Date    OTHER SURGICAL HISTORY      Patient states she has surgery on "ear tubes", and bone spur in R hand, and 2nd toe on left foot    TONSILLECTOMY       Social History   Social History     Substance and Sexual Activity   Alcohol Use Never    Frequency: Never     Social History     Substance and Sexual Activity   Drug Use Never     Social History     Tobacco Use   Smoking Status Former Smoker    Quit date: 2020    Years since quittin 1   Smokeless Tobacco Never Used   Tobacco Comment    Pt reports smoking aprox 1 pk of cigarettes daily     Family History: History reviewed  No pertinent family history      Meds/Allergies   Medications Prior to Admission   Medication    Dulaglutide (Trulicity) 1 5 GM/9 1SV SOPN    atorvastatin (LIPITOR) 20 mg tablet    Empagliflozin 10 MG TABS    gabapentin (NEURONTIN) 300 mg capsule    glipiZIDE (GLUCOTROL XL) 5 mg 24 hr tablet    lamoTRIgine (LaMICtal) 100 mg tablet    metFORMIN (GLUCOPHAGE) 1000 MG tablet    Mirabegron ER 50 MG TB24    naproxen (NAPROSYN) 500 mg tablet    topiramate (TOPAMAX) 100 mg tablet    ziprasidone (GEODON) 60 mg capsule     Allergies   Allergen Reactions    Amoxicillin Itching    Macrobid [Nitrofurantoin] Itching    Vancomycin Rash       Objective   First Vitals:   Blood Pressure: 132/74 (02/18/21 1442)  Pulse: (!) 125 (02/18/21 1442)  Temperature: 99 °F (37 2 °C) (02/18/21 1445)  Temp Source: Oral (02/18/21 2003)  Respirations: 18 (02/18/21 1442)  Height: 5' 5" (165 1 cm) (02/18/21 2003)  Weight - Scale: 115 kg (253 lb 15 5 oz) (02/18/21 2003)  SpO2: 95 % (02/18/21 1442)    Current Vitals:   Blood Pressure: 98/57 (02/19/21 1602)  Pulse: 94 (02/19/21 1602)  Temperature: 98 8 °F (37 1 °C) (02/19/21 1602)  Temp Source: Oral (02/18/21 2003)  Respirations: 17 (02/19/21 1602)  Height: 5' 5" (165 1 cm) (02/19/21 1049)  Weight - Scale: 115 kg (253 lb 15 5 oz) (02/18/21 2003)  SpO2: 95 % (02/19/21 1602)        BP 98/57   Pulse 94   Temp 98 8 °F (37 1 °C)   Resp 17   Ht 5' 5" (1 651 m)   Wt 115 kg (253 lb 15 5 oz)   SpO2 95%   BMI 42 26 kg/m²      General Appearance:    Alert, cooperative, no distress   Head:    Normocephalic, without obvious abnormality, atraumatic   Eyes:    PERRL, conjunctiva/corneas clear, EOM's intact        Nose:   Moist mucous membranes   Neck:   Supple, symmetrical, trachea midline   Back:     Symmetric   Lungs:     Respirations unlabored   Heart:    Regular rate and rhythm, S1 and S2 normal, no murmur, rub   or gallop   Abdomen:     Soft, non-tender   Extremities: There are no acute deformity seen  There is no acute pain to palpation or range of motion  There is no focal weakness  Pulses:   Pedal pulses are palpable  There is no cyanosis or gangrene  There is no sign of acute evolving dysvascularity  Skin:   The skin is warm and dry  There is local edema and erythema in the hallux bilaterally  This is more pronounced on the right  There are the aforementioned ulcerations present as above  There is a modest amount of necrotic subcutaneous tissue and some slough  There is no deep track, there is no pus expressed    There is no palpable fluctuance or soft tissue crepitus  There is a mild malodor  Neurologic:   Gross sensation is diminished  Protective sensation is absent  Lab Results:   Admission on 02/18/2021   Component Date Value    WBC 02/18/2021 10 17*    RBC 02/18/2021 4 86     Hemoglobin 02/18/2021 13 2     Hematocrit 02/18/2021 42 6     MCV 02/18/2021 88     MCH 02/18/2021 27 2     MCHC 02/18/2021 31 0*    RDW 02/18/2021 12 3     MPV 02/18/2021 9 0     Platelets 45/56/8696 336     nRBC 02/18/2021 0     Neutrophils Relative 02/18/2021 82*    Immat GRANS % 02/18/2021 1     Lymphocytes Relative 02/18/2021 10*    Monocytes Relative 02/18/2021 6     Eosinophils Relative 02/18/2021 1     Basophils Relative 02/18/2021 0     Neutrophils Absolute 02/18/2021 8 32*    Immature Grans Absolute 02/18/2021 0 13     Lymphocytes Absolute 02/18/2021 1 01     Monocytes Absolute 02/18/2021 0 59     Eosinophils Absolute 02/18/2021 0 09     Basophils Absolute 02/18/2021 0 03     Sodium 02/18/2021 138     Potassium 02/18/2021 3 6     Chloride 02/18/2021 102     CO2 02/18/2021 22     ANION GAP 02/18/2021 14*    BUN 02/18/2021 7     Creatinine 02/18/2021 1 00     Glucose 02/18/2021 251*    Calcium 02/18/2021 9 2     Corrected Calcium 02/18/2021 10 1     AST 02/18/2021 13     ALT 02/18/2021 21     Alkaline Phosphatase 02/18/2021 125*    Total Protein 02/18/2021 8 8*    Albumin 02/18/2021 2 9*    Total Bilirubin 02/18/2021 0 40     eGFR 02/18/2021 76     LACTIC ACID 02/18/2021 4 5*    Procalcitonin 02/18/2021 <0 05     Protime 02/18/2021 14 9*    INR 02/18/2021 1 15     PTT 02/18/2021 31     Blood Culture 02/18/2021 Received in Microbiology Lab  Culture in Progress   Blood Culture 02/18/2021 Received in Microbiology Lab  Culture in Progress       Color, UA 02/18/2021 Light Yellow     Clarity, UA 02/18/2021 Clear     Specific Gravity, UA 02/18/2021 1 015     pH, UA 02/18/2021 8 0     Leukocytes, UA 02/18/2021 Elevated glucose may cause decreased leukocyte values   See urine microscopic for [facility] result/*    Nitrite, UA 02/18/2021 Negative     Protein, UA 02/18/2021 Negative     Glucose, UA 02/18/2021 >=1000 (1%)*    Ketones, UA 02/18/2021 Negative     Urobilinogen, UA 02/18/2021 1 0     Bilirubin, UA 02/18/2021 Negative     Blood, UA 02/18/2021 Negative     Troponin I 02/18/2021 <0 02     EXT PREG TEST UR (Ref: N* 02/18/2021 Negative     Control 02/18/2021 Valid     CRP, High Sensitivity 02/18/2021 110 03     Sed Rate 02/18/2021 53*    D-Dimer, Quant 02/18/2021 1 48*    POC Glucose 02/18/2021 213*    RBC, UA 02/18/2021 None Seen     WBC, UA 02/18/2021 0-1     Epithelial Cells 02/18/2021 Moderate*    Bacteria, UA 02/18/2021 Moderate*    LACTIC ACID 02/18/2021 3 4*    Troponin I 02/18/2021 <0 02     Troponin I 02/18/2021 <0 02     LACTIC ACID 02/18/2021 1 2     POC Glucose 02/18/2021 123     Procalcitonin 02/19/2021 0 09     Sodium 02/19/2021 136     Potassium 02/19/2021 2 9*    Chloride 02/19/2021 103     CO2 02/19/2021 22     ANION GAP 02/19/2021 11     BUN 02/19/2021 8     Creatinine 02/19/2021 0 84     Glucose 02/19/2021 210*    Calcium 02/19/2021 8 0*    eGFR 02/19/2021 94     WBC 02/19/2021 6 72     RBC 02/19/2021 3 97     Hemoglobin 02/19/2021 10 9*    Hematocrit 02/19/2021 34 7*    MCV 02/19/2021 87     MCH 02/19/2021 27 5     MCHC 02/19/2021 31 4     RDW 02/19/2021 12 4     Platelets 55/21/9713 240     MPV 02/19/2021 9 1     POC Glucose 02/19/2021 151*    Ventricular Rate 02/18/2021 109     Atrial Rate 02/18/2021 109     MT Interval 02/18/2021 162     QRSD Interval 02/18/2021 90     QT Interval 02/18/2021 326     QTC Interval 02/18/2021 439     P Axis 02/18/2021 37     QRS Axis 02/18/2021 63     T Wave Axis 02/18/2021 39     Ventricular Rate 02/18/2021 109     Atrial Rate 02/18/2021 109     MT Interval 02/18/2021 164     QRSD Interval 02/18/2021 86  QT Interval 02/18/2021 326     QTC Interval 02/18/2021 439     P Axis 02/18/2021 36     QRS Axis 02/18/2021 62     T Wave Axis 02/18/2021 43     Ventricular Rate 02/18/2021 123     Atrial Rate 02/18/2021 61     QRSD Interval 02/18/2021 84     QT Interval 02/18/2021 430     QTC Interval 02/18/2021 615     QRS Axis 02/18/2021 11     T Wave Axis 02/18/2021 -2     POC Glucose 02/19/2021 172*             Results from last 7 days   Lab Units 02/18/21  1608   BLOOD CULTURE  Received in Microbiology Lab  Culture in Progress  Received in Microbiology Lab  Culture in Progress  Invalid input(s): LABAEARO            Imaging: I have personally reviewed pertinent films in PACS  EKG, Pathology, and Other Studies: I have personally reviewed pertinent reports        Code Status: Level 1 - Full Code  Advance Directive and Living Will:      Power of :    POLST:

## 2021-02-19 NOTE — ASSESSMENT & PLAN NOTE
Patient complains of some intermittent chest tightness, chest pain, in shortness of breath    She was previously maintained in a psychiatric facility related to anxiety and depression  Troponin trend negative  Chest x-ray and EKG unremarkable  No indication for further cardiac workup at this time  Chest CT negative for PE

## 2021-02-19 NOTE — ASSESSMENT & PLAN NOTE
Lab Results   Component Value Date    HGBA1C 6 2 (H) 01/21/2021       Recent Labs     02/18/21  1528 02/18/21  2340 02/19/21  0807   POCGLU 213* 123 151*       Blood Sugar Average: Last 72 hrs:  (P) 799 [id number]   Holding home oral anti hyperglycemic medications  Sliding scale  Carb controlled diet  Fingersticks  Hypoglycemia protocol

## 2021-02-19 NOTE — H&P
H&P- Zhanna Shultz 1990, 27 y o  female MRN: [id number]    Unit/Bed#: [facility] Encounter: [id number]    Primary Care Provider: Jarrod Jackson PA-C   Date and time admitted to hospital: 2/18/2021  2:39 PM    * Cellulitis of toe  Assessment & Plan  Bilateral great toe diabetic ulcers on the plantar surface digit pads with surrounding erythema of the right great toe  Podiatry consulted  Started on Ancef IV  Local wound care  Symptom management  Previously hospitalized in January for the same problem discharged with antibiotics however patient did not complete course    Diabetic foot infection [facility])  Assessment & Plan  Lab Results   Component Value Date    HGBA1C 6 2 (H) 01/21/2021       Recent Labs     02/18/21  1528   POCGLU 213*       Blood Sugar Average: Last 72 hrs:  (P) 213   Previously hospitalized for diabetic foot ulcers  Sent home with oral antibiotics  Patient did not take antibiotics to completion when she was inpatient psych for bipolar disorder  Bilateral great toe diabetic ulcers with surrounding cellulitis of the R great toe  Consult podiatry    Sepsis [facility])  Assessment & Plan  Tachycardia, leukocytosis of 10 17, lactate 4 5, in the setting of bilateral cellulitis great toes with diabetic foot ulcers  Follow-up on blood cultures  Repeat lactic acid  Started on IV cefepime and Vanco in the ED  Will transition to Ancef  Chief 2 L of IV fluids in the ED      Other chest pain  Assessment & Plan  Patient complains of some intermittent chest tightness, chest pain, in shortness of breath    She was previously maintained in a psychiatric facility related to anxiety and depression  Troponin initially negative, will continue to trend  Chest x-ray unremarkable  No indication for further cardiac workup at this time  Chest CT negative for PE    Seizure disorder [facility])  Assessment & Plan  Continue home dose Lamictal and seizure precautions    Diabetes [facility])  Assessment & Plan  Lab Results   Component Value Date    HGBA1C 6 2 (H) 01/21/2021       Recent Labs     02/18/21  1528   POCGLU 213*       Blood Sugar Average: Last 72 hrs:  (P) 213   Holding home oral anti hyperglycemic medications  Sliding scale  Carb controlled diet  Fingersticks  Hypoglycemia protocol      Hyperlipidemia  Assessment & Plan  Continue atorvastatin  Educated on lifestyle and diet modifications     VTE Prophylaxis: Heparin  / sequential compression device   Code Status:  Full code  POLST: POLST form is not discussed and not completed at this time  Discussion with family:  Not available    Anticipated Length of Stay:  Patient will be admitted on an Inpatient basis with an anticipated length of stay of  > 2 midnights  Justification for Hospital Stay:  Podiatry evaluation, wound care, IV antibiotics, management of sepsis    Total Time for Visit, including Counseling / Coordination of Care: 1 hour  Greater than 50% of this total time spent on direct patient counseling and coordination of care  Chief Complaint:      Chief Complaint   Patient presents with    Toe Pain     Pt presents via EMS with toe pain, also reports chest pain in center of chest       History of Present Illness:    Matias Mosley is a 27 y o  female with a past medical history of type 2 diabetes, bipolar 1 disorder, hyperlipidemia, seizure disorder who presents with toe pain and epigastric chest pain  The patient had previously been evaluated in the emergency department for this complaint at which time she was found to have a lactic acidosis prompting admission for IV antibiotics  Upon discharge the patient was taking oral antibiotics; however, the patient's friend states that the patient had been admitted at a psychiatric facility at which time she did not continue with oral antibiotics as prescribed and did not finish the course  States that the ulcers have been getting larger and draining blood and pus especially when walking   States that she has numbness and tingling in bilateral feet and some pain in the toes when ambulating  She was discharged from psychiatric facility 2-3 weeks ago  The patient admits that she has been experiencing chest tightness over the past couple of days but has not taken her temperature for any documented fevers  She additionally reports complaint of central chest pain beginning this morning around 11:00 a m  While lying down  The patient relates that this was associated with sweats and shortness of breath, and could therefore be likely related to patient's anxiety  Denies current suicidal or homicidal ideations  States she feels better since being discharged from an inpatient psych center  She states that her pain has been constant since onset and is still short of breath  She denies any calf pain or swelling  She has no personal or family history of DVT/PE  The patient was a previous 1 pack per day smoker though admits that she had quit smoking last week  She denies significant personal or family cardiac history  Review of Systems:    Review of Systems   Constitutional: Positive for chills and diaphoresis  Negative for activity change, appetite change, fatigue and fever  HENT: Negative  Eyes: Negative  Respiratory: Positive for chest tightness and shortness of breath  Negative for cough and wheezing  Cardiovascular: Positive for chest pain  Negative for palpitations and leg swelling  Gastrointestinal: Negative  Endocrine: Negative  Genitourinary: Negative  Musculoskeletal: Negative  Allergic/Immunologic: Negative  Neurological: Positive for headaches  Negative for dizziness, syncope, speech difficulty, weakness and light-headedness  Hematological: Negative  Psychiatric/Behavioral: Positive for decreased concentration and dysphoric mood  Negative for agitation, self-injury and suicidal ideas         Past Medical and Surgical History:     Past Medical History:   Diagnosis Date    Anxiety     Bipolar disorder ([facility] )     Depression     Diabetes mellitus ([facility] )     Epilepsy ([place] [address] )     Psychiatric disorder        Past Surgical History:   Procedure Laterality Date    OTHER SURGICAL HISTORY      Patient states she has surgery on "ear tubes", and bone spur in R hand, and 2nd toe on left foot    TONSILLECTOMY         Meds/Allergies:    Prior to Admission medications    Medication Sig Start Date End Date Taking? Authorizing Provider   Dulaglutide (Trulicity) 1 5 QC/3 5WZ SOPN Inject 1 5 mg under the skin once a week   Yes Historical Provider, MD   atorvastatin (LIPITOR) 20 mg tablet Take 20 mg by mouth    Historical Provider, MD   Empagliflozin 10 MG TABS Take 10 mg by mouth    Historical Provider, MD   gabapentin (NEURONTIN) 300 mg capsule Take 300 mg by mouth    Historical Provider, MD   glipiZIDE (GLUCOTROL XL) 5 mg 24 hr tablet Take 5 mg by mouth daily    Historical Provider, MD   lamoTRIgine (LaMICtal) 100 mg tablet Take 100 mg by mouth    Historical Provider, MD   metFORMIN (GLUCOPHAGE) 1000 MG tablet Take 1,000 mg by mouth 2 (two) times a day with meals    Historical Provider, MD   Mirabegron ER 50 MG TB24 Take 50 mg by mouth    Historical Provider, MD   naproxen (NAPROSYN) 500 mg tablet Take 500 mg by mouth 2 (two) times a day with meals    Historical Provider, MD   topiramate (TOPAMAX) 100 mg tablet Take 100 mg by mouth Takes 2 tabs at bed time    Historical Provider, MD   ziprasidone (GEODON) 60 mg capsule Take 60 mg by mouth    Historical Provider, MD     I have reviewed home medications using allscripts  Allergies:    Allergies   Allergen Reactions    Amoxicillin Itching    Macrobid [Nitrofurantoin] Itching    Vancomycin Rash       Social History:     Marital Status: Single   Occupation:  [employer]  Patient Pre-hospital Living Situation:  Private residence  Patient Pre-hospital Level of Mobility:  Independent  Patient Pre-hospital Diet Restrictions:  Carb control  Substance Use History:   Social History     Substance and Sexual Activity   Alcohol Use Never    Frequency: Never     Social History     Tobacco Use   Smoking Status Former Smoker    Quit date: 2020    Years since quittin 1   Smokeless Tobacco Never Used   Tobacco Comment    Pt reports smoking aprox 1 pk of cigarettes daily     Social History     Substance and Sexual Activity   Drug Use Never       Family History:    History reviewed  No pertinent family history  Physical Exam:     Vitals:   Blood Pressure: 96/57 (21 1830)  Pulse: (!) 107 (21 183)  Temperature: 99 °F (37 2 °C) (21 1445)  Respirations: 22 (21)  SpO2: 98 % (21)    Physical Exam  Constitutional:       Appearance: She is obese  HENT:      Head: Normocephalic and atraumatic  Nose: Nose normal       Mouth/Throat:      Mouth: Mucous membranes are moist    Eyes:      Pupils: Pupils are equal, round, and reactive to light  Neck:      Musculoskeletal: Normal range of motion  Cardiovascular:      Rate and Rhythm: Normal rate and regular rhythm  Pulses: Normal pulses  Heart sounds: Normal heart sounds  No murmur  No gallop  Pulmonary:      Effort: Pulmonary effort is normal  No respiratory distress  Breath sounds: Normal breath sounds  No rhonchi or rales  Chest:      Chest wall: Tenderness present  Abdominal:      General: There is no distension  Palpations: Abdomen is soft  Tenderness: There is no abdominal tenderness  There is no guarding  Musculoskeletal: Normal range of motion  Skin:     General: Skin is warm and dry  Capillary Refill: Capillary refill takes less than 2 seconds  Neurological:      General: No focal deficit present  Mental Status: She is alert and oriented to person, place, and time  Cranial Nerves: No cranial nerve deficit     Psychiatric:         Behavior: Behavior normal          Additional Data:     Lab Results: I have personally reviewed pertinent reports  Results from last 7 days   Lab Units 02/18/21  1608   WBC Thousand/uL 10 17*   HEMOGLOBIN g/dL 13 2   HEMATOCRIT % 42 6   PLATELETS Thousands/uL 336   NEUTROS PCT % 82*   LYMPHS PCT % 10*   MONOS PCT % 6   EOS PCT % 1     Results from last 7 days   Lab Units 02/18/21  1608   SODIUM mmol/L 138   POTASSIUM mmol/L 3 6   CHLORIDE mmol/L 102   CO2 mmol/L 22   BUN mg/dL 7   CREATININE mg/dL 1 00   ANION GAP mmol/L 14*   CALCIUM mg/dL 9 2   ALBUMIN g/dL 2 9*   TOTAL BILIRUBIN mg/dL 0 40   ALK PHOS U/L 125*   ALT U/L 21   AST U/L 13   GLUCOSE RANDOM mg/dL 251*     Results from last 7 days   Lab Units 02/18/21  1608   INR  1 15     Results from last 7 days   Lab Units 02/18/21  1528   POC GLUCOSE mg/dl 213*         Results from last 7 days   Lab Units 02/18/21  1817 02/18/21  1608   LACTIC ACID mmol/L 3 4* 4 5*       Imaging: I have personally reviewed pertinent reports  CTA ED chest PE Study   Final Result by Luis Moses MD (02/18 1742)      No pulmonary arterial embolism  Enlarged pulmonary artery which may indicate pulmonary arterial hypertension  No pulmonary infiltrate or consolidation  Workstation performed: [id number]         XR chest 1 view portable   Final Result by Kyle Pinto MD (02/18 1611)      No active pulmonary disease  Workstation performed: [id number]         XR toe great min 2 views LEFT   Final Result by Kyle Pinto MD (02/18 1610)      No radiographic evidence of osteomyelitis  Findings compatible with first toe ulcer, plantar medial aspect  Workstation performed: [id number]         XR toe great min 2 views RIGHT   Final Result by Kyle Pinto MD (02/18 1613)      No radiographic evidence of osteomyelitis              Workstation performed: [id number]             EKG, Pathology, and Other Studies Reviewed on Admission:   · EKG:  Not available    [facility] Reviewed: Yes     ** Please Note: This note has been constructed using a voice recognition system   **

## 2021-02-19 NOTE — MALNUTRITION/BMI
This medical record reflects one or more clinical indicators suggestive of malnutrition and/or morbid obesity  Malnutrition Findings:              BMI Findings:  Adult BMI Classifications: Morbid Obesity 40-44 9     Body mass index is 42 26 kg/m²  See Nutrition note dated 2/19/21 for additional details  Completed nutrition assessment is viewable in the nutrition documentation

## 2021-02-19 NOTE — ASSESSMENT & PLAN NOTE
Bilateral great toe diabetic ulcers on the plantar surface digit pads with surrounding erythema of the right great toe     X-ray showed no indication of osteomyelitis  Podiatry consulted  Continue Ancef IV  Local wound care  Symptom management  Previously hospitalized in January for the same problem discharged with antibiotics however patient did not complete course

## 2021-02-19 NOTE — ASSESSMENT & PLAN NOTE
Lab Results   Component Value Date    HGBA1C 6 2 (H) 01/21/2021       Recent Labs     02/18/21  1528 02/18/21  2340 02/19/21  0807   POCGLU 213* 123 151*       Blood Sugar Average: Last 72 hrs:  (P) 468 [id number]   Previously hospitalized for diabetic foot ulcers  Sent home with oral antibiotics  Patient did not take antibiotics to completion when she was inpatient psych for bipolar disorder  Bilateral great toe diabetic ulcers with surrounding cellulitis of the R great toe     Consult podiatry

## 2021-02-19 NOTE — PLAN OF CARE
Problem: Potential for Falls  Goal: Patient will remain free of falls  Description: INTERVENTIONS:  - Assess patient frequently for physical needs  -  Identify cognitive and physical deficits and behaviors that affect risk of falls    -  [place] fall precautions as indicated by assessment   - Educate patient/family on patient safety including physical limitations  - Instruct patient to call for assistance with activity based on assessment  - Modify environment to reduce risk of injury  - Consider OT/PT consult to assist with strengthening/mobility  Outcome: Progressing     Problem: PAIN - ADULT  Goal: Verbalizes/displays adequate comfort level or baseline comfort level  Description: Interventions:  - Encourage patient to monitor pain and request assistance  - Assess pain using appropriate pain scale  - Administer analgesics based on type and severity of pain and evaluate response  - Implement non-pharmacological measures as appropriate and evaluate response  - Consider cultural and social influences on pain and pain management  - Notify physician/advanced practitioner if interventions unsuccessful or patient reports new pain  Outcome: Progressing     Problem: INFECTION - ADULT  Goal: Absence or prevention of progression during hospitalization  Description: INTERVENTIONS:  - Assess and monitor for signs and symptoms of infection  - Monitor lab/diagnostic results  - Monitor all insertion sites, i e  indwelling lines, tubes, and drains  - Monitor endotracheal if appropriate and nasal secretions for changes in amount and color  - [place] appropriate cooling/warming therapies per order  - Administer medications as ordered  - Instruct and encourage patient and family to use good hand hygiene technique  - Identify and instruct in appropriate isolation precautions for identified infection/condition  Outcome: Progressing  Goal: Absence of fever/infection during neutropenic period  Description: INTERVENTIONS:  - Monitor WBC    Outcome: Progressing     Problem: SAFETY ADULT  Goal: Patient will remain free of falls  Description: INTERVENTIONS:  - Assess patient frequently for physical needs  -  Identify cognitive and physical deficits and behaviors that affect risk of falls    -  [place] fall precautions as indicated by assessment   - Educate patient/family on patient safety including physical limitations  - Instruct patient to call for assistance with activity based on assessment  - Modify environment to reduce risk of injury  - Consider OT/PT consult to assist with strengthening/mobility  Outcome: Progressing  Goal: Maintain or return to baseline ADL function  Description: INTERVENTIONS:  -  Assess patient's ability to carry out ADLs; assess patient's baseline for ADL function and identify physical deficits which impact ability to perform ADLs (bathing, care of mouth/teeth, toileting, grooming, dressing, etc )  - Assess/evaluate cause of self-care deficits   - Assess range of motion  - Assess patient's mobility; develop plan if impaired  - Assess patient's need for assistive devices and provide as appropriate  - Encourage maximum independence but intervene and supervise when necessary  - Involve family in performance of ADLs  - Assess for home care needs following discharge   - Consider OT consult to assist with ADL evaluation and planning for discharge  - Provide patient education as appropriate  Outcome: Progressing  Goal: Maintain or return mobility status to optimal level  Description: INTERVENTIONS:  - Assess patient's baseline mobility status (ambulation, transfers, stairs, etc )    - Identify cognitive and physical deficits and behaviors that affect mobility  - Identify mobility aids required to assist with transfers and/or ambulation (gait belt, sit-to-stand, lift, walker, cane, etc )  - [place] fall precautions as indicated by assessment  - Record patient progress and toleration of activity level on Mobility SBAR; progress patient to next Phase/Stage  - Instruct patient to call for assistance with activity based on assessment  - Consider rehabilitation consult to assist with strengthening/weightbearing, etc   Outcome: Progressing     Problem: DISCHARGE PLANNING  Goal: Discharge to home or other facility with appropriate resources  Description: INTERVENTIONS:  - Identify barriers to discharge w/patient and caregiver  - Arrange for needed discharge resources and transportation as appropriate  - Identify discharge learning needs (meds, wound care, etc )  - Arrange for interpretive services to assist at discharge as needed  - Refer to Case Management Department for coordinating discharge planning if the patient needs post-hospital services based on physician/advanced practitioner order or complex needs related to functional status, cognitive ability, or social support system  Outcome: Progressing     Problem: Knowledge Deficit  Goal: Patient/family/caregiver demonstrates understanding of disease process, treatment plan, medications, and discharge instructions  Description: Complete learning assessment and assess knowledge base    Interventions:  - Provide teaching at level of understanding  - Provide teaching via preferred learning methods  Outcome: Progressing

## 2021-02-19 NOTE — PROGRESS NOTES
Progress Note - Ronadl Rascon 1990, 27 y o  female MRN: [id number]    Unit/Bed#: -01 Encounter: [id number]    Primary Care Provider: Karla Jimenez PA-C   Date and time admitted to hospital: 2/18/2021  2:39 PM    * Cellulitis of toe  Assessment & Plan  Bilateral great toe diabetic ulcers on the plantar surface digit pads with surrounding erythema of the right great toe  X-ray showed no indication of osteomyelitis  Podiatry consulted  Continue Ancef IV  Local wound care  Symptom management  Previously hospitalized in January for the same problem discharged with antibiotics however patient did not complete course    Diabetic foot infection [facility])  Assessment & Plan  Lab Results   Component Value Date    HGBA1C 6 2 (H) 01/21/2021       Recent Labs     02/18/21  1528 02/18/21  2340 02/19/21  0807   POCGLU 213* 123 151*       Blood Sugar Average: Last 72 hrs:  (P) 187 [id number]   Previously hospitalized for diabetic foot ulcers  Sent home with oral antibiotics  Patient did not take antibiotics to completion when she was inpatient psych for bipolar disorder  Bilateral great toe diabetic ulcers with surrounding cellulitis of the R great toe  Consult podiatry    Sepsis [facility])  Assessment & Plan  Tachycardia, leukocytosis of 10 17, lactate 4 5, in the setting of bilateral cellulitis great toes with diabetic foot ulcers  Follow-up on blood cultures  Procalcitonin negative x2  Continue IV Ancef  Chief 2 L of IV fluids in the ED   Lactic acidosis and leukocytosis resolved    Bipolar 1 disorder ([facility] [address] )  Assessment & Plan  Denies current suicidal or homicidal ideations  Was recently at an inpatient psychiatric center discharge 2 weeks ago  States she is feeling better since discharge  Continue Lamictal and Topamax  Other chest pain  Assessment & Plan  Patient complains of some intermittent chest tightness, chest pain, in shortness of breath    She was previously maintained in a psychiatric facility related to anxiety and depression  Troponin trend negative  Chest x-ray and EKG unremarkable  No indication for further cardiac workup at this time  Chest CT negative for PE    Seizure disorder [facility])  Assessment & Plan  Continue home dose Lamictal and seizure precautions    Diabetes [facility])  Assessment & Plan  Lab Results   Component Value Date    HGBA1C 6 2 (H) 01/21/2021       Recent Labs     02/18/21  1528 02/18/21  2340 02/19/21  0807   POCGLU 213* 123 151*       Blood Sugar Average: Last 72 hrs:  (P) 897 [id number]   Holding home oral anti hyperglycemic medications  Sliding scale  Carb controlled diet  Fingersticks  Hypoglycemia protocol      Hyperlipidemia  Assessment & Plan  Continue atorvastatin  Educated on lifestyle and diet modifications    VTE Pharmacologic Prophylaxis:   Pharmacologic: Heparin  Mechanical VTE Prophylaxis in Place: Yes    Patient Centered Rounds: I have performed bedside rounds with nursing staff today  Discussions with Specialists or Other Care Team Provider:  Spoke with primary RN  Podiatry consulted  Education and Discussions with Family / Patient:  Spoke with patient and friend  Answered all questions and concerns  Time Spent for Care: 20 minutes  More than 50% of total time spent on counseling and coordination of care as described above  Current Length of Stay: 1 day(s)    Current Patient Status: Inpatient   Certification Statement: The patient will continue to require additional inpatient hospital stay due to IV antibiotics and awaiting Podiatry recommendations    Discharge Plan / Estimated Discharge Date: 1-2 days depending on podiatry recommendations and potential surgery  Code Status: Level 1 - Full Code      Subjective:   Patient comfortable and states she is feeling much better than yesterday  No longer having any chest pain after given Tylenol and oxycodone   Denies fevers, chest tightness, shortness of breath, nausea, vomiting, or abdominal pain  She has less fatigue today and no chills  Is tolerating PO  Is ambulating with a walker in her room  Denies any new issues  Objective:     Vitals:   Temp (24hrs), Av 5 °F (37 5 °C), Min:99 °F (37 2 °C), Max:100 3 °F (37 9 °C)    Temp:  [99 °F (37 2 °C)-100 3 °F (37 9 °C)] 99 5 °F (37 5 °C)  HR:  [] 88  Resp:  [18-22] 18  BP: ()/(56-74) 120/69  SpO2:  [93 %-100 %] 93 %  Body mass index is 42 26 kg/m²  Input and Output Summary (last 24 hours): Intake/Output Summary (Last 24 hours) at 2021 1402  Last data filed at 2021 1201  Gross per 24 hour   Intake 540 ml   Output --   Net 540 ml       Physical Exam:     Physical Exam  Constitutional:       Appearance: She is obese  HENT:      Mouth/Throat:      Mouth: Mucous membranes are moist       Pharynx: Oropharynx is clear  Cardiovascular:      Rate and Rhythm: Normal rate and regular rhythm  Pulses: Normal pulses  Heart sounds: Normal heart sounds  No murmur  No gallop  Pulmonary:      Effort: Pulmonary effort is normal  No respiratory distress  Breath sounds: No wheezing  Abdominal:      General: There is no distension  Palpations: Abdomen is soft  There is no mass  Tenderness: There is no abdominal tenderness  Musculoskeletal:        Feet:    Lymphadenopathy:      Cervical: No cervical adenopathy  Skin:     General: Skin is warm and dry  Capillary Refill: Capillary refill takes less than 2 seconds  Neurological:      General: No focal deficit present  Mental Status: She is alert and oriented to person, place, and time     Psychiatric:         Mood and Affect: Mood normal          Behavior: Behavior normal          Additional Data:     Labs:    Results from last 7 days   Lab Units 21  0532 21  1608   WBC Thousand/uL 6 72 10 17*   HEMOGLOBIN g/dL 10 9* 13 2   HEMATOCRIT % 34 7* 42 6   PLATELETS Thousands/uL 240 336   NEUTROS PCT %  --  82*   LYMPHS PCT %  --  10*   MONOS PCT %  --  6   EOS PCT %  --  1     Results from last 7 days   Lab Units 02/19/21  0532 02/18/21  1608   POTASSIUM mmol/L 2 9* 3 6   CHLORIDE mmol/L 103 102   CO2 mmol/L 22 22   BUN mg/dL 8 7   CREATININE mg/dL 0 84 1 00   CALCIUM mg/dL 8 0* 9 2   ALK PHOS U/L  --  125*   ALT U/L  --  21   AST U/L  --  13     Results from last 7 days   Lab Units 02/18/21  1608   INR  1 15       * I Have Reviewed All Lab Data Listed Above  * Additional Pertinent Lab Tests Reviewed: [facility]      Recent Cultures (last 7 days):     Results from last 7 days   Lab Units 02/18/21  1608   BLOOD CULTURE  Received in Microbiology Lab  Culture in Progress  Received in Microbiology Lab  Culture in Progress         Last 24 Hours Medication List:   Current Facility-Administered Medications   Medication Dose Route Frequency Provider Last Rate    acetaminophen  650 mg Oral Q6H PRN Jadene Cheeks, CRNP      aluminum-magnesium hydroxide-simethicone  30 mL Oral Q6H PRN Jadene Cheeks, CRNP      atorvastatin  20 mg Oral Daily With HiConversion, CRNP      cefazolin  2,000 mg Intravenous Q8H Jadene Cheeks, CRNP 2,000 mg (02/19/21 0921)    docusate sodium  100 mg Oral BID Jadene Cheeks, CRNP      gabapentin  300 mg Oral Daily Jadene Cheeks, CRNP      heparin (porcine)  5,000 Units Subcutaneous [facility] Jadene Cheeks, CRNP      insulin lispro  2-12 Units Subcutaneous TID AC Jadene Cheeks, CRNP      insulin lispro  2-12 Units Subcutaneous HS Jadene Cheeks, CRNP      lamoTRIgine  100 mg Oral Daily Jadene Cheeks, CRNP      naproxen  500 mg Oral BID With Meals Jadene Cheeks, CRNP      ondansetron  4 mg Intravenous Q6H PRN Jadene Cheeks, CRNP      oxybutynin  10 mg Oral Daily Jadene Cheeks, CRNP      oxyCODONE  5 mg Oral Q4H PRN Marcelino Caba PA-C      potassium chloride  40 mEq Oral TID With Meals Jadene Cheeks, CRNP      topiramate  200 mg Oral Daily NADER Altamirano          Today, Patient Was Seen By: NADER Altamirano    ** Please Note: Dragon 360 Dictation voice to text software may have been used in the creation of this document   **

## 2021-02-19 NOTE — ASSESSMENT & PLAN NOTE
Denies current suicidal or homicidal ideations  Was recently at an inpatient psychiatric center discharge 2 weeks ago  States she is feeling better since discharge  Continue Lamictal and Topamax

## 2021-02-19 NOTE — ASSESSMENT & PLAN NOTE
Tachycardia, leukocytosis of 10 17, lactate 4 5, in the setting of bilateral cellulitis great toes with diabetic foot ulcers  Follow-up on blood cultures  Procalcitonin negative x2  Continue IV Ancef    Chief 2 L of IV fluids in the ED   Lactic acidosis and leukocytosis resolved

## 2021-02-19 NOTE — CASE MANAGEMENT
LOS: 1 DAY  PATIENT IS NOT A BUNDLE  PATIENT IS A 30 DAY READMISSION  Patient was inpatient at [facility] from 1/19/21 to 1/22/21 due to a diabetic foot infection  Patient was discharged with antibiotics, but returned to [facility] after being noncompliant with medications  UNPLANNED READMISSION RISK SCORE IS 14 (GREEN/LOW)  Cm met with patient at bedside to complete general assessment and discuss discharge planning  Patient reported that nothing has changed since her last admission  She still resides with her friend in a 2 story home with 3 [place]  Denied use of DME and was IPTA with ADLs  Denied Hx of rehab and [address]  Patient uses [facility] in [facility] and denied any barriers to obtaining or affording prescriptions  Patient's PCP is Justo Enriquez  She recently started seeing a psychiatrist, Dr Constantino Yee  Patient reported Hx of bipolar, depression, and anxiety  Denied Hx of SA  Patient does not have a POA, but she is in the process of making her friend Jami Varner her POA  Patient is unemployed and able to drive  Her friend will transport home  CM reviewed discharge planning process including the following: identifying help at home, patient preference for discharge planning needs, pharmacy preference, and availability of treatment team to discuss questions or concerns patient and/or family may have regarding understanding medications and recognizing signs and symptoms once discharged  CM also encouraged patient to follow up with all recommended appointments after discharge  Patient advised of importance for patient and family to participate in managing patients medical well being  Patient denying any needs at this time  Cm department will continue to follow patient through discharge

## 2021-02-20 LAB
ANION GAP SERPL CALCULATED.3IONS-SCNC: 10 MMOL/L (ref 4–13)
BUN SERPL-MCNC: 9 MG/DL (ref 5–25)
CALCIUM SERPL-MCNC: 8.5 MG/DL (ref 8.3–10.1)
CHLORIDE SERPL-SCNC: 107 MMOL/L (ref 100–108)
CO2 SERPL-SCNC: 23 MMOL/L (ref 21–32)
CREAT SERPL-MCNC: 0.6 MG/DL (ref 0.6–1.3)
ERYTHROCYTE [DISTWIDTH] IN BLOOD BY AUTOMATED COUNT: 12.5 % (ref 11.6–15.1)
GFR SERPL CREATININE-BSD FRML MDRD: 123 ML/MIN/1.73SQ M
GLUCOSE SERPL-MCNC: 129 MG/DL (ref 65–140)
GLUCOSE SERPL-MCNC: 148 MG/DL (ref 65–140)
GLUCOSE SERPL-MCNC: 154 MG/DL (ref 65–140)
GLUCOSE SERPL-MCNC: 182 MG/DL (ref 65–140)
GLUCOSE SERPL-MCNC: 189 MG/DL (ref 65–140)
HCT VFR BLD AUTO: 37.2 % (ref 34.8–46.1)
HGB BLD-MCNC: 11.3 G/DL (ref 11.5–15.4)
MCH RBC QN AUTO: 27.3 PG (ref 26.8–34.3)
MCHC RBC AUTO-ENTMCNC: 30.4 G/DL (ref 31.4–37.4)
MCV RBC AUTO: 90 FL (ref 82–98)
PLATELET # BLD AUTO: 220 THOUSANDS/UL (ref 149–390)
PMV BLD AUTO: 9.2 FL (ref 8.9–12.7)
POTASSIUM SERPL-SCNC: 3.7 MMOL/L (ref 3.5–5.3)
RBC # BLD AUTO: 4.14 MILLION/UL (ref 3.81–5.12)
SODIUM SERPL-SCNC: 140 MMOL/L (ref 136–145)
WBC # BLD AUTO: 4.86 THOUSAND/UL (ref 4.31–10.16)

## 2021-02-20 PROCEDURE — 99232 SBSQ HOSP IP/OBS MODERATE 35: CPT | Performed by: NURSE PRACTITIONER

## 2021-02-20 PROCEDURE — 85027 COMPLETE CBC AUTOMATED: CPT | Performed by: NURSE PRACTITIONER

## 2021-02-20 PROCEDURE — 82948 REAGENT STRIP/BLOOD GLUCOSE: CPT

## 2021-02-20 PROCEDURE — 80048 BASIC METABOLIC PNL TOTAL CA: CPT | Performed by: NURSE PRACTITIONER

## 2021-02-20 RX ORDER — POTASSIUM CHLORIDE 20 MEQ/1
40 TABLET, EXTENDED RELEASE ORAL
Status: COMPLETED | OUTPATIENT
Start: 2021-02-20 | End: 2021-02-20

## 2021-02-20 RX ADMIN — INSULIN LISPRO 2 UNITS: 100 INJECTION, SOLUTION INTRAVENOUS; SUBCUTANEOUS at 12:30

## 2021-02-20 RX ADMIN — CEFAZOLIN SODIUM 2000 MG: 2 SOLUTION INTRAVENOUS at 15:56

## 2021-02-20 RX ADMIN — OXYBUTYNIN 10 MG: 5 TABLET, FILM COATED, EXTENDED RELEASE ORAL at 08:17

## 2021-02-20 RX ADMIN — POTASSIUM CHLORIDE 40 MEQ: 1500 TABLET, EXTENDED RELEASE ORAL at 12:37

## 2021-02-20 RX ADMIN — CEFAZOLIN SODIUM 2000 MG: 2 SOLUTION INTRAVENOUS at 23:55

## 2021-02-20 RX ADMIN — CEFAZOLIN SODIUM 2000 MG: 2 SOLUTION INTRAVENOUS at 00:07

## 2021-02-20 RX ADMIN — INSULIN LISPRO 2 UNITS: 100 INJECTION, SOLUTION INTRAVENOUS; SUBCUTANEOUS at 07:56

## 2021-02-20 RX ADMIN — POTASSIUM CHLORIDE 40 MEQ: 1500 TABLET, EXTENDED RELEASE ORAL at 08:23

## 2021-02-20 RX ADMIN — LAMOTRIGINE 100 MG: 100 TABLET ORAL at 08:17

## 2021-02-20 RX ADMIN — TOPIRAMATE 200 MG: 100 TABLET ORAL at 08:17

## 2021-02-20 RX ADMIN — NAPROXEN 500 MG: 250 TABLET ORAL at 17:27

## 2021-02-20 RX ADMIN — ATORVASTATIN CALCIUM 20 MG: 20 TABLET, FILM COATED ORAL at 17:28

## 2021-02-20 RX ADMIN — GABAPENTIN 300 MG: 300 CAPSULE ORAL at 08:17

## 2021-02-20 RX ADMIN — DOCUSATE SODIUM 100 MG: 100 CAPSULE, LIQUID FILLED ORAL at 08:17

## 2021-02-20 RX ADMIN — CEFAZOLIN SODIUM 2000 MG: 2 SOLUTION INTRAVENOUS at 08:17

## 2021-02-20 RX ADMIN — POTASSIUM CHLORIDE 40 MEQ: 1500 TABLET, EXTENDED RELEASE ORAL at 17:28

## 2021-02-20 RX ADMIN — INSULIN LISPRO 2 UNITS: 100 INJECTION, SOLUTION INTRAVENOUS; SUBCUTANEOUS at 21:29

## 2021-02-20 NOTE — ASSESSMENT & PLAN NOTE
Lab Results   Component Value Date    HGBA1C 6 2 (H) 01/21/2021       Recent Labs     02/19/21  1636 02/19/21  1925 02/19/21  2156 02/20/21  0642   POCGLU 156* 161* 156* 154*       Blood Sugar Average: Last 72 hrs:  (P) 160 75   Previously hospitalized for diabetic foot ulcers  Sent home with oral antibiotics  Patient did not take antibiotics to completion when she was inpatient psych for bipolar disorder  Bilateral great toe diabetic ulcers with surrounding cellulitis of the R great toe  Blood cultures and wound cultures are negative anaerobic pending  Podiatry debrided cleansed and dressed ulcers  Patient advised of the importance of following her general medical regimen and her podiatric regimen including follow-up for these ulcers  Podietry does not recommend any surgical debridement or amputation at this time

## 2021-02-20 NOTE — ASSESSMENT & PLAN NOTE
Patient complains of some intermittent chest tightness, chest pain, in shortness of breath    She was previously maintained in a psychiatric facility related to anxiety and depression  Troponin trend negative  Chest x-ray and EKG unremarkable  No indication for further cardiac workup at this time  Chest CT negative for PE  States that pain has gone away with hydrocodone

## 2021-02-20 NOTE — ASSESSMENT & PLAN NOTE
Tachycardia, leukocytosis of 10 17, lactate 4 5, in the setting of bilateral cellulitis great toes with diabetic foot ulcers  Follow-up on blood cultures  Procalcitonin negative x2  Continue IV Ancef    Chief 2 L of IV fluids in the ED   Lactic acidosis and leukocytosis resolved  Blood cultures and wound cultures negative

## 2021-02-20 NOTE — PROGRESS NOTES
Progress Note - Michelle Buckley 1990, 27 y o  female MRN: [id number]    Unit/Bed#: -[facility] Encounter: [id number]    Primary Care Provider: Murali Marquez PA-C   Date and time admitted to hospital: 2/18/2021  2:39 PM    * Cellulitis of toe  Assessment & Plan  Bilateral great toe diabetic ulcers on the plantar surface digit pads with surrounding erythema of the right great toe  X-ray showed no indication of osteomyelitis  Podiatry consulted  Continue Ancef IV  Local wound care  Symptom management  Previously hospitalized in January for the same problem discharged with antibiotics however patient did not complete course  Wound cultures and blood cultures negative  Educated on proper diabetic foot care/ wound care  Diabetic foot infection [facility])  Assessment & Plan  Lab Results   Component Value Date    HGBA1C 6 2 (H) 01/21/2021       Recent Labs     02/19/21  1636 02/19/21  1925 02/19/21  2156 02/20/21  0642   POCGLU 156* 161* 156* 154*       Blood Sugar Average: Last 72 hrs:  (P) 160 75   Previously hospitalized for diabetic foot ulcers  Sent home with oral antibiotics  Patient did not take antibiotics to completion when she was inpatient psych for bipolar disorder  Bilateral great toe diabetic ulcers with surrounding cellulitis of the R great toe  Blood cultures and wound cultures are negative anaerobic pending  Podiatry debrided cleansed and dressed ulcers  Patient advised of the importance of following her general medical regimen and her podiatric regimen including follow-up for these ulcers  Podietry does not recommend any surgical debridement or amputation at this time  Sepsis ([facility] [address] )  Assessment & Plan  Tachycardia, leukocytosis of 10 17, lactate 4 5, in the setting of bilateral cellulitis great toes with diabetic foot ulcers  Follow-up on blood cultures  Procalcitonin negative x2  Continue IV Ancef    Chief 2 L of IV fluids in the ED   Lactic acidosis and leukocytosis resolved  Blood cultures and wound cultures negative    Other chest pain  Assessment & Plan  Patient complains of some intermittent chest tightness, chest pain, in shortness of breath  She was previously maintained in a psychiatric facility related to anxiety and depression  Troponin trend negative  Chest x-ray and EKG unremarkable  No indication for further cardiac workup at this time  Chest CT negative for PE  States that pain has gone away with hydrocodone    Diabetes [facility])  Assessment & Plan  Lab Results   Component Value Date    HGBA1C 6 2 (H) 01/21/2021       Recent Labs     02/19/21  1636 02/19/21  1925 02/19/21  2156 02/20/21  0642   POCGLU 156* 161* 156* 154*       Blood Sugar Average: Last 72 hrs:  (P) 160 75   Holding home oral anti hyperglycemic medications  Sliding scale  Carb controlled diet  Fingersticks  Hypoglycemia protocol      Hyperlipidemia  Assessment & Plan  Continue atorvastatin  Educated on lifestyle and diet modifications    VTE Pharmacologic Prophylaxis:   Pharmacologic: Heparin  Mechanical VTE Prophylaxis in Place: Yes    Patient Centered Rounds: I have performed bedside rounds with nursing staff today  Discussions with Specialists or Other Care Team Provider:  Spoke with primary RN  Podiatry consulted  Education and Discussions with Family / Patient:  Spoke with patient  Denies any new questions or concerns  Time Spent for Care: 20 minutes  More than 50% of total time spent on counseling and coordination of care as described above  Current Length of Stay: 2 day(s)    Current Patient Status: Inpatient   Certification Statement: The patient will continue to require additional inpatient hospital stay due to IV antibiotics    Discharge Plan / Estimated Discharge Date: 2/21/21    Code Status: Level 1 - Full Code    Subjective:   Patient appears comfortable in bed  Patient denies any fevers, chills, fatigue, chest pain, chest tightness, nausea, vomiting, abdominal pain    Denies any new symptoms  Is tolerating p o  Denies any pain or noticeable drainage from her wounds  Not complaining of numbness or tingling at this time  Patient had a bowel movement this morning  Educated on proper diabetic foot care  Patient interested in leaving tomorrow  States that her friend Carolina Laguna is the best contact for discharge planning  Objective:     Vitals:   Temp (24hrs), Av 8 °F (37 1 °C), Min:98 8 °F (37 1 °C), Max:98 8 °F (37 1 °C)    Temp:  [98 8 °F (37 1 °C)] 98 8 °F (37 1 °C)  HR:  [78-94] 82  Resp:  [16-17] 17  BP: ()/(56-59) 107/59  SpO2:  [93 %-95 %] 93 %  Body mass index is 42 26 kg/m²  Input and Output Summary (last 24 hours): Intake/Output Summary (Last 24 hours) at 2021 1205  Last data filed at 2021 3347  Gross per 24 hour   Intake 120 ml   Output --   Net 120 ml       Physical Exam:     Physical Exam  Constitutional:       Appearance: She is obese  HENT:      Mouth/Throat:      Mouth: Mucous membranes are moist    Cardiovascular:      Rate and Rhythm: Normal rate and regular rhythm  Pulses: Normal pulses  Heart sounds: Normal heart sounds  No murmur  No gallop  Pulmonary:      Effort: Pulmonary effort is normal       Breath sounds: Normal breath sounds  No wheezing, rhonchi or rales  Abdominal:      General: There is no distension  Palpations: Abdomen is soft  Tenderness: There is no abdominal tenderness  Skin:     General: Skin is warm and dry  Capillary Refill: Capillary refill takes less than 2 seconds  Comments: Ulcers on bilateral great toes covered with dressing  Minimal draining through the dressing  Skin surrounding dressing is nontender, non erythematous  Neurological:      General: No focal deficit present  Mental Status: She is alert and oriented to person, place, and time     Psychiatric:         Mood and Affect: Mood normal          Behavior: Behavior normal          Additional Data:     Labs:    Results from last 7 days   Lab Units 02/20/21  0546  02/18/21  1608   WBC Thousand/uL 4 86   < > 10 17*   HEMOGLOBIN g/dL 11 3*   < > 13 2   HEMATOCRIT % 37 2   < > 42 6   PLATELETS Thousands/uL 220   < > 336   NEUTROS PCT %  --   --  82*   LYMPHS PCT %  --   --  10*   MONOS PCT %  --   --  6   EOS PCT %  --   --  1    < > = values in this interval not displayed  Results from last 7 days   Lab Units 02/20/21  0546  02/18/21  1608   POTASSIUM mmol/L 3 7   < > 3 6   CHLORIDE mmol/L 107   < > 102   CO2 mmol/L 23   < > 22   BUN mg/dL 9   < > 7   CREATININE mg/dL 0 60   < > 1 00   CALCIUM mg/dL 8 5   < > 9 2   ALK PHOS U/L  --   --  125*   ALT U/L  --   --  21   AST U/L  --   --  13    < > = values in this interval not displayed  Results from last 7 days   Lab Units 02/18/21  1608   INR  1 15       * I Have Reviewed All Lab Data Listed Above  * Additional Pertinent Lab Tests Reviewed: Dylan 66 Admission Reviewed    Recent Cultures (last 7 days):     Results from last 7 days   Lab Units 02/19/21  1557 02/18/21  1608   BLOOD CULTURE   --  No Growth at 24 hrs  No Growth at 24 hrs     GRAM STAIN RESULT  No Polys or Bacteria seen  --        Last 24 Hours Medication List:   Current Facility-Administered Medications   Medication Dose Route Frequency Provider Last Rate    acetaminophen  650 mg Oral Q6H PRN [facility], CRNP      aluminum-magnesium hydroxide-simethicone  30 mL Oral Q6H PRN [facility], CRCHARLIE      atorvastatin  20 mg Oral Daily With [facility], CRNP      cefazolin  2,000 mg Intravenous Q8H [facility], CRNP 2,000 mg (02/20/21 0817)    docusate sodium  100 mg Oral BID [facility], CRNP      gabapentin  300 mg Oral Daily [facility], NADER      heparin (porcine)  5,000 Units Subcutaneous [facility], CRNP      insulin lispro  2-12 Units Subcutaneous TID [facility], CRNP      insulin lispro  2-12 Units Subcutaneous HS Shruti FRAZIER NADER Stanford      lamoTRIgine  100 mg Oral Daily NADER Dent      naproxen  500 mg Oral BID With Meals NADER Dent      ondansetron  4 mg Intravenous Q6H PRN NADER Dent      oxybutynin  10 mg Oral Daily LoniNADER Solares      oxyCODONE  5 mg Oral Q4H PRN Breezy Mark PA-C      potassium chloride  40 mEq Oral TID With Meals NADER Dent      topiramate  200 mg Oral Daily LoniNADER Solares          Today, Patient Was Seen By: NADER Dent    ** Please Note: Dragon 360 Dictation voice to text software may have been used in the creation of this document   **

## 2021-02-20 NOTE — ASSESSMENT & PLAN NOTE
Lab Results   Component Value Date    HGBA1C 6 2 (H) 01/21/2021       Recent Labs     02/19/21  1636 02/19/21  1925 02/19/21  2156 02/20/21  0642   POCGLU 156* 161* 156* 154*       Blood Sugar Average: Last 72 hrs:  (P) 160 75   Holding home oral anti hyperglycemic medications  Sliding scale  Carb controlled diet  Fingersticks  Hypoglycemia protocol

## 2021-02-20 NOTE — PLAN OF CARE
Problem: Potential for Falls  Goal: Patient will remain free of falls  Description: INTERVENTIONS:  - Assess patient frequently for physical needs  -  Identify cognitive and physical deficits and behaviors that affect risk of falls    -  [place] fall precautions as indicated by assessment   - Educate patient/family on patient safety including physical limitations  - Instruct patient to call for assistance with activity based on assessment  - Modify environment to reduce risk of injury  - Consider OT/PT consult to assist with strengthening/mobility  Outcome: Progressing     Problem: PAIN - ADULT  Goal: Verbalizes/displays adequate comfort level or baseline comfort level  Description: Interventions:  - Encourage patient to monitor pain and request assistance  - Assess pain using appropriate pain scale  - Administer analgesics based on type and severity of pain and evaluate response  - Implement non-pharmacological measures as appropriate and evaluate response  - Consider cultural and social influences on pain and pain management  - Notify physician/advanced practitioner if interventions unsuccessful or patient reports new pain  Outcome: Progressing     Problem: INFECTION - ADULT  Goal: Absence or prevention of progression during hospitalization  Description: INTERVENTIONS:  - Assess and monitor for signs and symptoms of infection  - Monitor lab/diagnostic results  - Monitor all insertion sites, i e  indwelling lines, tubes, and drains  - Monitor endotracheal if appropriate and nasal secretions for changes in amount and color  - [place] appropriate cooling/warming therapies per order  - Administer medications as ordered  - Instruct and encourage patient and family to use good hand hygiene technique  - Identify and instruct in appropriate isolation precautions for identified infection/condition  Outcome: Progressing     Problem: SAFETY ADULT  Goal: Patient will remain free of falls  Description: INTERVENTIONS:  - Assess patient frequently for physical needs  -  Identify cognitive and physical deficits and behaviors that affect risk of falls    -  [place] fall precautions as indicated by assessment   - Educate patient/family on patient safety including physical limitations  - Instruct patient to call for assistance with activity based on assessment  - Modify environment to reduce risk of injury  - Consider OT/PT consult to assist with strengthening/mobility  Outcome: Progressing     Problem: DISCHARGE PLANNING  Goal: Discharge to home or other facility with appropriate resources  Description: INTERVENTIONS:  - Identify barriers to discharge w/patient and caregiver  - Arrange for needed discharge resources and transportation as appropriate  - Identify discharge learning needs (meds, wound care, etc )  - Arrange for interpretive services to assist at discharge as needed  - Refer to Case Management Department for coordinating discharge planning if the patient needs post-hospital services based on physician/advanced practitioner order or complex needs related to functional status, cognitive ability, or social support system  Outcome: Progressing

## 2021-02-20 NOTE — ASSESSMENT & PLAN NOTE
Bilateral great toe diabetic ulcers on the plantar surface digit pads with surrounding erythema of the right great toe  X-ray showed no indication of osteomyelitis  Podiatry consulted  Continue Ancef IV  Local wound care  Symptom management  Previously hospitalized in January for the same problem discharged with antibiotics however patient did not complete course  Wound cultures and blood cultures negative  Educated on proper diabetic foot care/ wound care

## 2021-02-21 VITALS
DIASTOLIC BLOOD PRESSURE: 73 MMHG | HEIGHT: 65 IN | BODY MASS INDEX: 42.31 KG/M2 | OXYGEN SATURATION: 94 % | SYSTOLIC BLOOD PRESSURE: 110 MMHG | WEIGHT: 253.97 LBS | RESPIRATION RATE: 17 BRPM | HEART RATE: 71 BPM | TEMPERATURE: 97.7 F

## 2021-02-21 LAB
ANION GAP SERPL CALCULATED.3IONS-SCNC: 9 MMOL/L (ref 4–13)
BUN SERPL-MCNC: 11 MG/DL (ref 5–25)
CALCIUM SERPL-MCNC: 8.8 MG/DL (ref 8.3–10.1)
CHLORIDE SERPL-SCNC: 107 MMOL/L (ref 100–108)
CO2 SERPL-SCNC: 24 MMOL/L (ref 21–32)
CREAT SERPL-MCNC: 0.64 MG/DL (ref 0.6–1.3)
ERYTHROCYTE [DISTWIDTH] IN BLOOD BY AUTOMATED COUNT: 12.6 % (ref 11.6–15.1)
GFR SERPL CREATININE-BSD FRML MDRD: 120 ML/MIN/1.73SQ M
GLUCOSE SERPL-MCNC: 147 MG/DL (ref 65–140)
GLUCOSE SERPL-MCNC: 171 MG/DL (ref 65–140)
GLUCOSE SERPL-MCNC: 219 MG/DL (ref 65–140)
HCT VFR BLD AUTO: 35.9 % (ref 34.8–46.1)
HGB BLD-MCNC: 10.9 G/DL (ref 11.5–15.4)
MCH RBC QN AUTO: 27 PG (ref 26.8–34.3)
MCHC RBC AUTO-ENTMCNC: 30.4 G/DL (ref 31.4–37.4)
MCV RBC AUTO: 89 FL (ref 82–98)
PLATELET # BLD AUTO: 238 THOUSANDS/UL (ref 149–390)
PMV BLD AUTO: 9.4 FL (ref 8.9–12.7)
POTASSIUM SERPL-SCNC: 4 MMOL/L (ref 3.5–5.3)
RBC # BLD AUTO: 4.03 MILLION/UL (ref 3.81–5.12)
SODIUM SERPL-SCNC: 140 MMOL/L (ref 136–145)
WBC # BLD AUTO: 5.77 THOUSAND/UL (ref 4.31–10.16)

## 2021-02-21 PROCEDURE — 99239 HOSP IP/OBS DSCHRG MGMT >30: CPT | Performed by: NURSE PRACTITIONER

## 2021-02-21 PROCEDURE — 80048 BASIC METABOLIC PNL TOTAL CA: CPT | Performed by: NURSE PRACTITIONER

## 2021-02-21 PROCEDURE — 82948 REAGENT STRIP/BLOOD GLUCOSE: CPT

## 2021-02-21 PROCEDURE — 85027 COMPLETE CBC AUTOMATED: CPT | Performed by: NURSE PRACTITIONER

## 2021-02-21 RX ORDER — CEPHALEXIN 500 MG/1
500 CAPSULE ORAL EVERY 6 HOURS SCHEDULED
Qty: 20 CAPSULE | Refills: 0 | Status: SHIPPED | OUTPATIENT
Start: 2021-02-21 | End: 2021-02-26

## 2021-02-21 RX ADMIN — NAPROXEN 500 MG: 250 TABLET ORAL at 08:41

## 2021-02-21 RX ADMIN — LAMOTRIGINE 100 MG: 100 TABLET ORAL at 08:42

## 2021-02-21 RX ADMIN — GABAPENTIN 300 MG: 300 CAPSULE ORAL at 08:41

## 2021-02-21 RX ADMIN — INSULIN LISPRO 2 UNITS: 100 INJECTION, SOLUTION INTRAVENOUS; SUBCUTANEOUS at 08:43

## 2021-02-21 RX ADMIN — OXYBUTYNIN 10 MG: 5 TABLET, FILM COATED, EXTENDED RELEASE ORAL at 08:41

## 2021-02-21 RX ADMIN — INSULIN LISPRO 4 UNITS: 100 INJECTION, SOLUTION INTRAVENOUS; SUBCUTANEOUS at 11:55

## 2021-02-21 RX ADMIN — CEFAZOLIN SODIUM 2000 MG: 2 SOLUTION INTRAVENOUS at 08:43

## 2021-02-21 RX ADMIN — CEFAZOLIN SODIUM 2000 MG: 2 SOLUTION INTRAVENOUS at 14:32

## 2021-02-21 NOTE — ASSESSMENT & PLAN NOTE
On admission opatient complains of some intermittent chest tightness, chest pain, in shortness of breath    She was previously maintained in a psychiatric facility related to anxiety and depression  Troponin trend negative  Chest x-ray and EKG unremarkable  No indication for further cardiac workup at this time  Chest CT negative for PE  States that pain has gone away with hydrocodone

## 2021-02-21 NOTE — ASSESSMENT & PLAN NOTE
Tachycardia, leukocytosis of 10 17, lactate 4 5, in the setting of bilateral cellulitis great toes with diabetic foot ulcers    Blood cultures negative  Procalcitonin negative x2  Received 2 L of IV fluids in the ED   Lactic acidosis and leukocytosis resolved  Switch from IV Ancef to oral cephalexin for 5 days

## 2021-02-21 NOTE — ASSESSMENT & PLAN NOTE
Lab Results   Component Value Date    HGBA1C 6 2 (H) 01/21/2021       Recent Labs     02/20/21  1147 02/20/21  1721 02/20/21  2121 02/21/21  0654   POCGLU 189* 129 182* 171*       Blood Sugar Average: Last 72 hrs:  (P) 835 [id number]   Previously hospitalized for diabetic foot ulcers  Sent home with oral antibiotics  Patient did not take antibiotics to completion when she was inpatient psych for bipolar disorder  Bilateral great toe diabetic ulcers with surrounding cellulitis of the R great toe  Blood cultures and wound cultures are negative anaerobic pending  Podiatry debrided cleansed and dressed ulcers  Patient advised of the importance of following her general medical regimen and her podiatric regimen including follow-up for these ulcers  Podietry does not recommend any surgical debridement or amputation at this time

## 2021-02-21 NOTE — DISCHARGE SUMMARY
Discharge- Matias Mosley 1990, 27 y o  female MRN: [id number]    Unit/Bed#: -[facility] Encounter: [id number]    Primary Care Provider: Pattie Marques PA-C   Date and time admitted to hospital: 2/18/2021  2:39 PM    * Cellulitis of toe  Assessment & Plan  Bilateral great toe diabetic ulcers on the plantar surface digit pads with surrounding erythema of the right great toe  X-ray showed no indication of osteomyelitis  Podiatry consulted  Ancef IV switched to oral cephalexin for an additional 5 days  Local wound care  Symptom management  Previously hospitalized in January for the same problem discharged with antibiotics however patient did not complete course  Wound cultures and blood cultures negative  Anaerobe cultures pending  Educated on proper diabetic foot care/ wound care  Diabetic foot infection [facility])  Assessment & Plan  Lab Results   Component Value Date    HGBA1C 6 2 (H) 01/21/2021       Recent Labs     02/20/21  1147 02/20/21  1721 02/20/21  2121 02/21/21  0654   POCGLU 189* 129 182* 171*       Blood Sugar Average: Last 72 hrs:  (P) 025 [id number]   Previously hospitalized for diabetic foot ulcers  Sent home with oral antibiotics  Patient did not take antibiotics to completion when she was inpatient psych for bipolar disorder  Bilateral great toe diabetic ulcers with surrounding cellulitis of the R great toe  Blood cultures and wound cultures are negative anaerobic pending  Podiatry debrided cleansed and dressed ulcers  Patient advised of the importance of following her general medical regimen and her podiatric regimen including follow-up for these ulcers  Podietry does not recommend any surgical debridement or amputation at this time  Sepsis ([address] )  Assessment & Plan  Tachycardia, leukocytosis of 10 17, lactate 4 5, in the setting of bilateral cellulitis great toes with diabetic foot ulcers    Blood cultures negative  Procalcitonin negative x2  Received 2 L of IV fluids in the ED   Lactic acidosis and leukocytosis resolved  Switch from IV Ancef to oral cephalexin for 5 days        Bipolar 1 disorder [facility])  Assessment & Plan  Denies current suicidal or homicidal ideations  Was recently at an inpatient psychiatric center discharge 2 weeks ago  States she is feeling better since discharge  Continue Lamictal and Topamax  Other chest pain  Assessment & Plan  On admission opatient complains of some intermittent chest tightness, chest pain, in shortness of breath  She was previously maintained in a psychiatric facility related to anxiety and depression  Troponin trend negative  Chest x-ray and EKG unremarkable  No indication for further cardiac workup at this time  Chest CT negative for PE  States that pain has gone away with hydrocodone    Seizure disorder [facility])  Assessment & Plan  Continue home dose Lamictal and seizure precautions    Diabetes [facility])  Assessment & Plan  Lab Results   Component Value Date    HGBA1C 6 2 (H) 01/21/2021       Recent Labs     02/20/21  1147 02/20/21  1721 02/20/21  2121 02/21/21  0654   POCGLU 189* 129 182* 171*       Blood Sugar Average: Last 72 hrs:  (P) 271 [id number]   Holding home oral anti hyperglycemic medications  Sliding scale  Carb controlled diet  Fingersticks  Hypoglycemia protocol      Hyperlipidemia  Assessment & Plan  Continue atorvastatin  Educated on lifestyle and diet modifications      Discharging Physician / Practitioner: Kamala Moran  PCP: Dwaine Martinez PA-C  Admission Date:   Admission Orders (From admission, onward)     Ordered        02/18/21 1810  Inpatient Admission  Once                   Discharge Date: 02/21/21    Resolved Problems  Date Reviewed: 1/19/2021    None          Consultations During Hospital Stay:  · Podiatry    Procedures Performed:   · None    Significant Findings / Test Results:   · CXR:  No active cardiopulmonary disease  · XR left great toe: No radiographic evidence of osteomyelitis   Findings compatible with first toe ulcer, plantar medial aspect  · XR right great toe: No radiographic evidence of osteomyelitis  · CTA chest:No pulmonary arterial embolism  Enlarged pulmonary artery which may indicate pulmonary arterial hypertension  No pulmonary infiltrate or consolidation  Incidental Findings:   · None     Test Results Pending at Discharge (will require follow up): · Anaerobic wound culture     Outpatient Tests Requested:  · None    Complications:  None    Reason for Admission:  Diabetic ulcers with cellulitis and suspected sepsis    Hospital Course:     Gurpreet Leon is a 27 y o  female with a past medical history significant for uncontrolled type 2 diabetes, chronic diabetic ulcers, bipolar 1 disorder, seizure disorder who originally presented to the hospital on 2/18/2021 due to infected diabetic ulcers on plantar aspect of bilateral great toes with associated fevers and chills  She met sepsis criteria on admission  Chest x-ray negative for osteomyelitis  Patient was started on IV Ancef  Fevers resolved by hospital day 1  Blood cultures and wound cultures were negative  Seen by Podiatry who cleansed, debrided, and dressed the wounds  Podiatry did not recommend any surgical debridement or amputation at this time  Will continue oral cephalexin for 5 days  Was educated on proper wound care  follow-up with Podiatry outpatient  Please see above list of diagnoses and related plan for additional information  Condition at Discharge: good     Discharge Day Visit / Exam:     Subjective:  Patient denies chest pain, chest tightness, shortness of breath, fevers, chills, pain in her toes  Is able to ambulate without pain  Denies any nausea vomiting, abdominal pain and is tolerating p o  Patient is ready to home today  Patient was educated on the importance of Podiatry follow-up and wound care management    Vitals: Blood Pressure: 92/55 (02/21/21 0656)  Pulse: 71 (02/21/21 8634)  Temperature: 97 7 °F (36 5 °C) (02/21/21 0656)  Temp Source: Oral (02/18/21 2003)  Respirations: 15 (02/21/21 0656)  Height: 5' 5" (165 1 cm) (02/19/21 1049)  Weight - Scale: 115 kg (253 lb 15 5 oz) (02/18/21 2003)  SpO2: 94 % (02/21/21 0656)  Exam:   Physical Exam  Constitutional:       Appearance: She is obese  HENT:      Mouth/Throat:      Mouth: Mucous membranes are moist    Cardiovascular:      Rate and Rhythm: Normal rate and regular rhythm  Pulses: Normal pulses  Heart sounds: Normal heart sounds  Pulmonary:      Effort: Pulmonary effort is normal  No respiratory distress  Breath sounds: Normal breath sounds  No wheezing or rales  Abdominal:      General: There is no distension  Palpations: Abdomen is soft  Tenderness: There is no abdominal tenderness  Feet:      Comments: Wounds are covered with dressing  Skin surrounding the dressing is pink and dry  Skin:     General: Skin is warm and dry  Capillary Refill: Capillary refill takes less than 2 seconds  Neurological:      General: No focal deficit present  Mental Status: She is alert and oriented to person, place, and time  Psychiatric:         Mood and Affect: Mood normal          Behavior: Behavior normal          Discussion with Family:  Not available    Discharge instructions/Information to patient and family:   See after visit summary for information provided to patient and family  Provisions for Follow-Up Care:  See after visit summary for information related to follow-up care and any pertinent home health orders  Disposition:     Home    For Discharges to [facility] SNF:   · Not Applicable to this Patient - Not Applicable to this Patient    Planned Readmission:  No     Discharge Statement:  I spent 45 minutes discharging the patient  This time was spent on the day of discharge  I had direct contact with the patient on the day of discharge   Greater than 50% of the total time was spent examining patient, answering all patient questions, arranging and discussing plan of care with patient as well as directly providing post-discharge instructions  Additional time then spent on discharge activities  Discharge Medications:  See after visit summary for reconciled discharge medications provided to patient and family        ** Please Note: This note has been constructed using a voice recognition system **

## 2021-02-21 NOTE — ASSESSMENT & PLAN NOTE
Bilateral great toe diabetic ulcers on the plantar surface digit pads with surrounding erythema of the right great toe  X-ray showed no indication of osteomyelitis  Podiatry consulted  Ancef IV switched to oral cephalexin for an additional 5 days  Local wound care  Symptom management  Previously hospitalized in January for the same problem discharged with antibiotics however patient did not complete course  Wound cultures and blood cultures negative  Anaerobe cultures pending  Educated on proper diabetic foot care/ wound care

## 2021-02-21 NOTE — DISCHARGE INSTR - AVS FIRST PAGE
Thank you for choosing [facility], please take all prescriptions as instructed, please make appropriate follow-up visits      Please keep your wound clean and covered it will not heal if you continue allowing it to get soiled

## 2021-02-21 NOTE — ASSESSMENT & PLAN NOTE
Lab Results   Component Value Date    HGBA1C 6 2 (H) 01/21/2021       Recent Labs     02/20/21  1147 02/20/21  1721 02/20/21  2121 02/21/21  0654   POCGLU 189* 129 182* 171*       Blood Sugar Average: Last 72 hrs:  (P) 306 [id number]   Holding home oral anti hyperglycemic medications  Sliding scale  Carb controlled diet  Fingersticks  Hypoglycemia protocol

## 2021-02-21 NOTE — CASE MANAGEMENT
Pt needs lyft ride home  [facility] confirmed address w pt and waiver signed--placed in medical records  Cm set up for 5p  Pt and RN notified  Cm spoke w pt regarding wound care   She reports her friend assists and denies need for home RN

## 2021-02-21 NOTE — PLAN OF CARE
Problem: Potential for Falls  Goal: Patient will remain free of falls  Description: INTERVENTIONS:  - Assess patient frequently for physical needs  -  Identify cognitive and physical deficits and behaviors that affect risk of falls    -  [place] fall precautions as indicated by assessment   - Educate patient/family on patient safety including physical limitations  - Instruct patient to call for assistance with activity based on assessment  - Modify environment to reduce risk of injury  - Consider OT/PT consult to assist with strengthening/mobility  Outcome: Progressing     Problem: PAIN - ADULT  Goal: Verbalizes/displays adequate comfort level or baseline comfort level  Description: Interventions:  - Encourage patient to monitor pain and request assistance  - Assess pain using appropriate pain scale  - Administer analgesics based on type and severity of pain and evaluate response  - Implement non-pharmacological measures as appropriate and evaluate response  - Consider cultural and social influences on pain and pain management  - Notify physician/advanced practitioner if interventions unsuccessful or patient reports new pain  Outcome: Progressing     Problem: INFECTION - ADULT  Goal: Absence or prevention of progression during hospitalization  Description: INTERVENTIONS:  - Assess and monitor for signs and symptoms of infection  - Monitor lab/diagnostic results  - Monitor all insertion sites, i e  indwelling lines, tubes, and drains  - Monitor endotracheal if appropriate and nasal secretions for changes in amount and color  - [place] appropriate cooling/warming therapies per order  - Administer medications as ordered  - Instruct and encourage patient and family to use good hand hygiene technique  - Identify and instruct in appropriate isolation precautions for identified infection/condition  Outcome: Progressing  Goal: Absence of fever/infection during neutropenic period  Description: INTERVENTIONS:  - Monitor WBC    Outcome: Progressing     Problem: SAFETY ADULT  Goal: Patient will remain free of falls  Description: INTERVENTIONS:  - Assess patient frequently for physical needs  -  Identify cognitive and physical deficits and behaviors that affect risk of falls    -  [place] fall precautions as indicated by assessment   - Educate patient/family on patient safety including physical limitations  - Instruct patient to call for assistance with activity based on assessment  - Modify environment to reduce risk of injury  - Consider OT/PT consult to assist with strengthening/mobility  Outcome: Progressing  Goal: Maintain or return to baseline ADL function  Description: INTERVENTIONS:  -  Assess patient's ability to carry out ADLs; assess patient's baseline for ADL function and identify physical deficits which impact ability to perform ADLs (bathing, care of mouth/teeth, toileting, grooming, dressing, etc )  - Assess/evaluate cause of self-care deficits   - Assess range of motion  - Assess patient's mobility; develop plan if impaired  - Assess patient's need for assistive devices and provide as appropriate  - Encourage maximum independence but intervene and supervise when necessary  - Involve family in performance of ADLs  - Assess for home care needs following discharge   - Consider OT consult to assist with ADL evaluation and planning for discharge  - Provide patient education as appropriate  Outcome: Progressing  Goal: Maintain or return mobility status to optimal level  Description: INTERVENTIONS:  - Assess patient's baseline mobility status (ambulation, transfers, stairs, etc )    - Identify cognitive and physical deficits and behaviors that affect mobility  - Identify mobility aids required to assist with transfers and/or ambulation (gait belt, sit-to-stand, lift, walker, cane, etc )  - [place] fall precautions as indicated by assessment  - Record patient progress and toleration of activity level on Mobility SBAR; progress patient to next Phase/Stage  - Instruct patient to call for assistance with activity based on assessment  - Consider rehabilitation consult to assist with strengthening/weightbearing, etc   Outcome: Progressing     Problem: Potential for Falls  Goal: Patient will remain free of falls  Description: INTERVENTIONS:  - Assess patient frequently for physical needs  -  Identify cognitive and physical deficits and behaviors that affect risk of falls    -  [place] fall precautions as indicated by assessment   - Educate patient/family on patient safety including physical limitations  - Instruct patient to call for assistance with activity based on assessment  - Modify environment to reduce risk of injury  - Consider OT/PT consult to assist with strengthening/mobility  Outcome: Progressing     Problem: PAIN - ADULT  Goal: Verbalizes/displays adequate comfort level or baseline comfort level  Description: Interventions:  - Encourage patient to monitor pain and request assistance  - Assess pain using appropriate pain scale  - Administer analgesics based on type and severity of pain and evaluate response  - Implement non-pharmacological measures as appropriate and evaluate response  - Consider cultural and social influences on pain and pain management  - Notify physician/advanced practitioner if interventions unsuccessful or patient reports new pain  Outcome: Progressing     Problem: INFECTION - ADULT  Goal: Absence or prevention of progression during hospitalization  Description: INTERVENTIONS:  - Assess and monitor for signs and symptoms of infection  - Monitor lab/diagnostic results  - Monitor all insertion sites, i e  indwelling lines, tubes, and drains  - Monitor endotracheal if appropriate and nasal secretions for changes in amount and color  - [place] appropriate cooling/warming therapies per order  - Administer medications as ordered  - Instruct and encourage patient and family to use good hand hygiene technique  - Identify and instruct in appropriate isolation precautions for identified infection/condition  Outcome: Progressing  Goal: Absence of fever/infection during neutropenic period  Description: INTERVENTIONS:  - Monitor WBC    Outcome: Progressing     Problem: SAFETY ADULT  Goal: Patient will remain free of falls  Description: INTERVENTIONS:  - Assess patient frequently for physical needs  -  Identify cognitive and physical deficits and behaviors that affect risk of falls    -  [place] fall precautions as indicated by assessment   - Educate patient/family on patient safety including physical limitations  - Instruct patient to call for assistance with activity based on assessment  - Modify environment to reduce risk of injury  - Consider OT/PT consult to assist with strengthening/mobility  Outcome: Progressing  Goal: Maintain or return to baseline ADL function  Description: INTERVENTIONS:  -  Assess patient's ability to carry out ADLs; assess patient's baseline for ADL function and identify physical deficits which impact ability to perform ADLs (bathing, care of mouth/teeth, toileting, grooming, dressing, etc )  - Assess/evaluate cause of self-care deficits   - Assess range of motion  - Assess patient's mobility; develop plan if impaired  - Assess patient's need for assistive devices and provide as appropriate  - Encourage maximum independence but intervene and supervise when necessary  - Involve family in performance of ADLs  - Assess for home care needs following discharge   - Consider OT consult to assist with ADL evaluation and planning for discharge  - Provide patient education as appropriate  Outcome: Progressing  Goal: Maintain or return mobility status to optimal level  Description: INTERVENTIONS:  - Assess patient's baseline mobility status (ambulation, transfers, stairs, etc )    - Identify cognitive and physical deficits and behaviors that affect mobility  - Identify mobility aids required to assist with transfers and/or ambulation (gait belt, sit-to-stand, lift, walker, cane, etc )  - [place] fall precautions as indicated by assessment  - Record patient progress and toleration of activity level on Mobility SBAR; progress patient to next Phase/Stage  - Instruct patient to call for assistance with activity based on assessment  - Consider rehabilitation consult to assist with strengthening/weightbearing, etc   Outcome: Progressing     Problem: DISCHARGE PLANNING  Goal: Discharge to home or other facility with appropriate resources  Description: INTERVENTIONS:  - Identify barriers to discharge w/patient and caregiver  - Arrange for needed discharge resources and transportation as appropriate  - Identify discharge learning needs (meds, wound care, etc )  - Arrange for interpretive services to assist at discharge as needed  - Refer to Case Management Department for coordinating discharge planning if the patient needs post-hospital services based on physician/advanced practitioner order or complex needs related to functional status, cognitive ability, or social support system  Outcome: Progressing     Problem: Knowledge Deficit  Goal: Patient/family/caregiver demonstrates understanding of disease process, treatment plan, medications, and discharge instructions  Description: Complete learning assessment and assess knowledge base    Interventions:  - Provide teaching at level of understanding  - Provide teaching via preferred learning methods  Outcome: Progressing

## 2021-02-21 NOTE — DISCHARGE INSTRUCTIONS
Diabetes and Your Skin   WHAT YOU NEED TO KNOW:   Diabetes can affect every part of your body, including your skin  Diabetes that is not well controlled can damage blood vessels and nerves  Damage to blood vessels can make it hard for blood to flow to tissues and organs  A lack of blood flow to your skin can cause ulcers that are difficult to heal  Skin ulcers are also called sores  People with diabetes can also have more bacterial skin infections than other people  Most skin conditions can be prevented with good blood sugar control  Skin sores can be hard to heal, or become life or limb-threatening, if not treated early  DISCHARGE INSTRUCTIONS:   Call your doctor or care team provider if:   · You get a severe burn or cut  · You have a sore that is painful, warm to the touch, or has redness around it  · Your sore does not get better or seems to get worse  · You have a fever  · Your blood sugar levels continue to be higher than they should be  · You have questions or concerns about your condition or care  Prevent skin sores:   · Keep your blood sugars within target range  Your care team provider will tell you what your blood sugar levels should be  High blood sugar levels increase your risk for skin infections and poor wound healing  · Keep your skin clean  Do not take hot baths or showers  They can cause your skin to get dry  Do not take a bubble bath if you have dry skin  Use moisturizing soaps  · Keep your skin from becoming too dry  Apply moisturizing lotion after baths or showers, especially in cold, dry weather  When you scratch dry, itchy skin, you can cause your skin to be open to infection  Bathe less during cold weather and use lotion to moisturize  Do not put lotion between your toes  Moisture between your toes could lead to skin breakdown  Use a humidifier to keep air in your home from being dry  · Keep areas where skin touches skin dry    Use talcum powder in areas such as armpits and groin  You may also need it under your breasts, and between your toes  Moisture in these areas can cause a fungal infection  · Treat cuts immediately  Clean minor cuts with soap and water  Cover them with sterile gauze  Follow up with your doctor or diabetes care team providers as directed:  Write down your questions so you remember to ask them during your visits  © Copyright [address] Information is for End User's use only and may not be sold, redistributed or otherwise used for commercial purposes  All illustrations and images included in CareNotes® are the copyrighted property of [employer]  or [employer]   The above information is an  only  It is not intended as medical advice for individual conditions or treatments  Talk to your doctor, nurse or pharmacist before following any medical regimen to see if it is safe and effective for you  Foot Care for People with Diabetes   AMBULATORY CARE:   What you need to know about foot care:   · Foot care helps protect your feet and prevent foot ulcers or sores  Long-term high blood sugar levels can damage the blood vessels and nerves in your legs and feet  This damage makes it hard to feel pressure, pain, temperature, and touch  You may not be able to feel a cut or sore, or shoes that are too tight  Foot care is needed to prevent serious problems, such as an infection or amputation  · Diabetes may cause your toes to become crooked or curved under  These changes may affect the way you walk and can lead to increased pressure on your foot  The pressure can decrease blood flow to your feet  Lack of blood flow increases your risk for a foot ulcer  Do not ignore small problems, such as dry skin or small wounds  These can become life-threatening over time without proper care  Call your care team provider if:   · Your feet become numb, weak, or hard to move       · You have pus draining from a sore on your foot      · You have a wound on your foot that gets bigger, deeper, or does not heal      · You see blisters, cuts, scratches, calluses, or sores on your foot  · You have a fever, and your feet become red, warm, and swollen  · Your toenails become thick, curled, or yellow  · You find it hard to check your feet because your vision is poor  · You have questions or concerns about your condition or care  How to care for your feet:   · Check your feet each day  Look at your whole foot, including the bottom, and between and under your toes  Check for wounds, corns, and calluses  Use a mirror to see the bottom of your feet  The skin on your feet may be shiny, tight, or darker than normal  Your feet may also be cold and pale  Feel your feet by running your hands along the tops, bottoms, sides, and between your toes  Redness, swelling, and warmth are signs of blood flow problems that can lead to a foot ulcer  Do not try to remove corns or calluses yourself  · Wash your feet each day with soap and warm water  Do not use hot water, because this can injure your foot  Dry your feet gently with a towel after you wash them  Dry between and under your toes  · Apply lotion or a moisturizer on your dry feet  Ask your care team provider what lotions are best to use  Do not put lotion or moisturizer between your toes  Moisture between your toes could lead to skin breakdown  · Cut your toenails correctly  File or cut your toenails straight across  Use a soft brush to clean around your toenails  If your toenails are very thick, you may need to have a care team provider or specialist cut them  · Protect your feet  Do not walk barefoot or wear your shoes without socks  Check your shoes for rocks or other objects that can hurt your feet  Wear cotton socks to help keep your feet dry  Wear socks without toe seams, or wear them with the seams inside out  Change your socks each day   Do not wear socks that are dirty or damp  · Wear shoes that fit well  Wear shoes that do not rub against any area of your feet  Your shoes should be ½ to ¾ inch (1 to 2 centimeters) longer than your feet  Your shoes should also have extra space around the widest part of your feet  Walking or athletic shoes with laces or straps that adjust are best  Ask your care team provider for help to choose shoes that fit you best  Ask him or her if you need to wear an insert, orthotic, or bandage on your feet  · Go to your follow-up visits  Your care team provider will do a foot exam at least once a year  You may need a foot exam more often if you have nerve damage, foot deformities, or ulcers  He will check for nerve damage and how well you can feel your feet  He will check your shoes to see if they fit well  · Do not smoke  Smoking can damage your blood vessels and put you at increased risk for foot ulcers  Ask your care team provider for information if you currently smoke and need help to quit  E-cigarettes or smokeless tobacco still contain nicotine  Talk to your care team provider before you use these products  Follow up with your diabetes care team provider or foot specialist as directed: You will need to have your feet checked at least once a year  You may need a foot exam more often if you have nerve damage, foot deformities, or ulcers  Write down your questions so you remember to ask them during your visits  © Copyright [address] [place] [address] Drive Information is for End User's use only and may not be sold, redistributed or otherwise used for commercial purposes  All illustrations and images included in CareNotes® are the copyrighted property of [employer]  or [facility]ame   The above information is an  only  It is not intended as medical advice for individual conditions or treatments   Talk to your doctor, nurse or pharmacist before following any medical regimen to see if it is safe and effective for you

## 2021-02-23 LAB
BACTERIA BLD CULT: NORMAL
BACTERIA BLD CULT: NORMAL
BACTERIA SPEC ANAEROBE CULT: ABNORMAL

## 2021-02-24 LAB
BACTERIA WND AEROBE CULT: ABNORMAL
GRAM STN SPEC: ABNORMAL

## 2021-03-10 ENCOUNTER — HOSPITAL ENCOUNTER (INPATIENT)
Facility: HOSPITAL | Age: 31
LOS: 7 days | Discharge: HOME HEALTH | DRG: 617 | End: 2021-03-17
Attending: EMERGENCY MEDICINE | Admitting: STUDENT IN AN ORGANIZED HEALTH CARE EDUCATION/TRAINING PROGRAM
Payer: MEDICARE

## 2021-03-10 ENCOUNTER — APPOINTMENT (EMERGENCY)
Dept: RADIOLOGY | Facility: HOSPITAL | Age: 31
DRG: 617 | End: 2021-03-10
Payer: MEDICARE

## 2021-03-10 DIAGNOSIS — M86.9 OSTEOMYELITIS (HCC): Primary | ICD-10-CM

## 2021-03-10 DIAGNOSIS — L03.031 CELLULITIS OF RIGHT TOE: ICD-10-CM

## 2021-03-10 LAB
ALBUMIN SERPL BCP-MCNC: 2.9 G/DL (ref 3.5–5)
ALP SERPL-CCNC: 97 U/L (ref 46–116)
ALT SERPL W P-5'-P-CCNC: 21 U/L (ref 12–78)
ANION GAP SERPL CALCULATED.3IONS-SCNC: 14 MMOL/L (ref 4–13)
APTT PPP: 31 SECONDS (ref 23–37)
AST SERPL W P-5'-P-CCNC: 15 U/L (ref 5–45)
BASOPHILS # BLD AUTO: 0.03 THOUSANDS/ΜL (ref 0–0.1)
BASOPHILS NFR BLD AUTO: 0 % (ref 0–1)
BILIRUB SERPL-MCNC: 0.68 MG/DL (ref 0.2–1)
BUN SERPL-MCNC: 9 MG/DL (ref 5–25)
CALCIUM ALBUM COR SERPL-MCNC: 9.6 MG/DL (ref 8.3–10.1)
CALCIUM SERPL-MCNC: 8.7 MG/DL (ref 8.3–10.1)
CHLORIDE SERPL-SCNC: 101 MMOL/L (ref 100–108)
CO2 SERPL-SCNC: 23 MMOL/L (ref 21–32)
CREAT SERPL-MCNC: 0.79 MG/DL (ref 0.6–1.3)
EOSINOPHIL # BLD AUTO: 0.14 THOUSAND/ΜL (ref 0–0.61)
EOSINOPHIL NFR BLD AUTO: 1 % (ref 0–6)
ERYTHROCYTE [DISTWIDTH] IN BLOOD BY AUTOMATED COUNT: 13.5 % (ref 11.6–15.1)
GFR SERPL CREATININE-BSD FRML MDRD: 101 ML/MIN/1.73SQ M
GLUCOSE SERPL-MCNC: 141 MG/DL (ref 65–140)
GLUCOSE SERPL-MCNC: 167 MG/DL (ref 65–140)
HCT VFR BLD AUTO: 38.4 % (ref 34.8–46.1)
HGB BLD-MCNC: 11.5 G/DL (ref 11.5–15.4)
IMM GRANULOCYTES # BLD AUTO: 0.06 THOUSAND/UL (ref 0–0.2)
IMM GRANULOCYTES NFR BLD AUTO: 1 % (ref 0–2)
INR PPP: 1.02 (ref 0.84–1.19)
LACTATE SERPL-SCNC: 1.9 MMOL/L (ref 0.5–2)
LYMPHOCYTES # BLD AUTO: 1.78 THOUSANDS/ΜL (ref 0.6–4.47)
LYMPHOCYTES NFR BLD AUTO: 18 % (ref 14–44)
MCH RBC QN AUTO: 25.8 PG (ref 26.8–34.3)
MCHC RBC AUTO-ENTMCNC: 29.9 G/DL (ref 31.4–37.4)
MCV RBC AUTO: 86 FL (ref 82–98)
MONOCYTES # BLD AUTO: 1.13 THOUSAND/ΜL (ref 0.17–1.22)
MONOCYTES NFR BLD AUTO: 11 % (ref 4–12)
NEUTROPHILS # BLD AUTO: 6.89 THOUSANDS/ΜL (ref 1.85–7.62)
NEUTS SEG NFR BLD AUTO: 69 % (ref 43–75)
NRBC BLD AUTO-RTO: 0 /100 WBCS
PLATELET # BLD AUTO: 295 THOUSANDS/UL (ref 149–390)
PMV BLD AUTO: 9.9 FL (ref 8.9–12.7)
POTASSIUM SERPL-SCNC: 3.6 MMOL/L (ref 3.5–5.3)
PROCALCITONIN SERPL-MCNC: <0.05 NG/ML
PROT SERPL-MCNC: 8.3 G/DL (ref 6.4–8.2)
PROTHROMBIN TIME: 13.6 SECONDS (ref 11.6–14.5)
RBC # BLD AUTO: 4.45 MILLION/UL (ref 3.81–5.12)
SODIUM SERPL-SCNC: 138 MMOL/L (ref 136–145)
WBC # BLD AUTO: 10.03 THOUSAND/UL (ref 4.31–10.16)

## 2021-03-10 PROCEDURE — 80053 COMPREHEN METABOLIC PANEL: CPT | Performed by: PHYSICIAN ASSISTANT

## 2021-03-10 PROCEDURE — 73660 X-RAY EXAM OF TOE(S): CPT

## 2021-03-10 PROCEDURE — 87186 SC STD MICRODIL/AGAR DIL: CPT | Performed by: INTERNAL MEDICINE

## 2021-03-10 PROCEDURE — 85730 THROMBOPLASTIN TIME PARTIAL: CPT | Performed by: PHYSICIAN ASSISTANT

## 2021-03-10 PROCEDURE — 83605 ASSAY OF LACTIC ACID: CPT | Performed by: PHYSICIAN ASSISTANT

## 2021-03-10 PROCEDURE — 99284 EMERGENCY DEPT VISIT MOD MDM: CPT | Performed by: EMERGENCY MEDICINE

## 2021-03-10 PROCEDURE — 36415 COLL VENOUS BLD VENIPUNCTURE: CPT | Performed by: PHYSICIAN ASSISTANT

## 2021-03-10 PROCEDURE — 87147 CULTURE TYPE IMMUNOLOGIC: CPT | Performed by: INTERNAL MEDICINE

## 2021-03-10 PROCEDURE — 99284 EMERGENCY DEPT VISIT MOD MDM: CPT

## 2021-03-10 PROCEDURE — 87205 SMEAR GRAM STAIN: CPT | Performed by: INTERNAL MEDICINE

## 2021-03-10 PROCEDURE — 87070 CULTURE OTHR SPECIMN AEROBIC: CPT | Performed by: INTERNAL MEDICINE

## 2021-03-10 PROCEDURE — 82948 REAGENT STRIP/BLOOD GLUCOSE: CPT

## 2021-03-10 PROCEDURE — 96365 THER/PROPH/DIAG IV INF INIT: CPT

## 2021-03-10 PROCEDURE — 84145 PROCALCITONIN (PCT): CPT | Performed by: PHYSICIAN ASSISTANT

## 2021-03-10 PROCEDURE — 99223 1ST HOSP IP/OBS HIGH 75: CPT | Performed by: INTERNAL MEDICINE

## 2021-03-10 PROCEDURE — 85610 PROTHROMBIN TIME: CPT | Performed by: PHYSICIAN ASSISTANT

## 2021-03-10 PROCEDURE — 87040 BLOOD CULTURE FOR BACTERIA: CPT | Performed by: PHYSICIAN ASSISTANT

## 2021-03-10 PROCEDURE — 85025 COMPLETE CBC W/AUTO DIFF WBC: CPT | Performed by: PHYSICIAN ASSISTANT

## 2021-03-10 RX ORDER — ATORVASTATIN CALCIUM 20 MG/1
20 TABLET, FILM COATED ORAL
Status: DISCONTINUED | OUTPATIENT
Start: 2021-03-11 | End: 2021-03-17 | Stop reason: HOSPADM

## 2021-03-10 RX ORDER — ONDANSETRON 2 MG/ML
4 INJECTION INTRAMUSCULAR; INTRAVENOUS EVERY 6 HOURS PRN
Status: DISCONTINUED | OUTPATIENT
Start: 2021-03-10 | End: 2021-03-17 | Stop reason: HOSPADM

## 2021-03-10 RX ORDER — CEFAZOLIN SODIUM 1 G/50ML
1000 SOLUTION INTRAVENOUS EVERY 8 HOURS
Status: DISCONTINUED | OUTPATIENT
Start: 2021-03-10 | End: 2021-03-11

## 2021-03-10 RX ORDER — CLINDAMYCIN PHOSPHATE 600 MG/50ML
600 INJECTION INTRAVENOUS ONCE
Status: COMPLETED | OUTPATIENT
Start: 2021-03-10 | End: 2021-03-11

## 2021-03-10 RX ORDER — HEPARIN SODIUM 5000 [USP'U]/ML
5000 INJECTION, SOLUTION INTRAVENOUS; SUBCUTANEOUS EVERY 8 HOURS SCHEDULED
Status: DISCONTINUED | OUTPATIENT
Start: 2021-03-10 | End: 2021-03-17 | Stop reason: HOSPADM

## 2021-03-10 RX ORDER — OXYCODONE HYDROCHLORIDE 5 MG/1
5 TABLET ORAL EVERY 4 HOURS PRN
Status: DISCONTINUED | OUTPATIENT
Start: 2021-03-10 | End: 2021-03-17 | Stop reason: HOSPADM

## 2021-03-10 RX ORDER — OXYBUTYNIN CHLORIDE 5 MG/1
10 TABLET, EXTENDED RELEASE ORAL DAILY
Status: DISCONTINUED | OUTPATIENT
Start: 2021-03-11 | End: 2021-03-17 | Stop reason: HOSPADM

## 2021-03-10 RX ORDER — OXYCODONE HYDROCHLORIDE 10 MG/1
10 TABLET ORAL EVERY 4 HOURS PRN
Status: DISCONTINUED | OUTPATIENT
Start: 2021-03-10 | End: 2021-03-17 | Stop reason: HOSPADM

## 2021-03-10 RX ORDER — LAMOTRIGINE 100 MG/1
100 TABLET ORAL DAILY
Status: DISCONTINUED | OUTPATIENT
Start: 2021-03-11 | End: 2021-03-17 | Stop reason: HOSPADM

## 2021-03-10 RX ORDER — HYDROMORPHONE HCL/PF 1 MG/ML
0.5 SYRINGE (ML) INJECTION EVERY 4 HOURS PRN
Status: DISCONTINUED | OUTPATIENT
Start: 2021-03-10 | End: 2021-03-17 | Stop reason: HOSPADM

## 2021-03-10 RX ORDER — GABAPENTIN 300 MG/1
300 CAPSULE ORAL DAILY
Status: DISCONTINUED | OUTPATIENT
Start: 2021-03-11 | End: 2021-03-17 | Stop reason: HOSPADM

## 2021-03-10 RX ORDER — TOPIRAMATE 100 MG/1
200 TABLET, FILM COATED ORAL DAILY
Status: DISCONTINUED | OUTPATIENT
Start: 2021-03-11 | End: 2021-03-12

## 2021-03-10 RX ORDER — ACETAMINOPHEN 325 MG/1
650 TABLET ORAL EVERY 6 HOURS PRN
Status: DISCONTINUED | OUTPATIENT
Start: 2021-03-10 | End: 2021-03-17 | Stop reason: HOSPADM

## 2021-03-10 RX ADMIN — CLINDAMYCIN IN 5 PERCENT DEXTROSE 600 MG: 12 INJECTION, SOLUTION INTRAVENOUS at 21:13

## 2021-03-10 RX ADMIN — CEFTRIAXONE SODIUM 2000 MG: 10 INJECTION, POWDER, FOR SOLUTION INTRAVENOUS at 20:51

## 2021-03-10 RX ADMIN — CEFAZOLIN SODIUM 1000 MG: 1 SOLUTION INTRAVENOUS at 22:33

## 2021-03-11 PROBLEM — E66.01 MORBID OBESITY DUE TO EXCESS CALORIES (HCC): Status: ACTIVE | Noted: 2021-03-11

## 2021-03-11 LAB
ANION GAP SERPL CALCULATED.3IONS-SCNC: 12 MMOL/L (ref 4–13)
BASOPHILS # BLD AUTO: 0.03 THOUSANDS/ΜL (ref 0–0.1)
BASOPHILS NFR BLD AUTO: 0 % (ref 0–1)
BUN SERPL-MCNC: 7 MG/DL (ref 5–25)
CALCIUM SERPL-MCNC: 8.3 MG/DL (ref 8.3–10.1)
CHLORIDE SERPL-SCNC: 104 MMOL/L (ref 100–108)
CO2 SERPL-SCNC: 22 MMOL/L (ref 21–32)
CREAT SERPL-MCNC: 0.66 MG/DL (ref 0.6–1.3)
EOSINOPHIL # BLD AUTO: 0.13 THOUSAND/ΜL (ref 0–0.61)
EOSINOPHIL NFR BLD AUTO: 2 % (ref 0–6)
ERYTHROCYTE [DISTWIDTH] IN BLOOD BY AUTOMATED COUNT: 13.6 % (ref 11.6–15.1)
GFR SERPL CREATININE-BSD FRML MDRD: 119 ML/MIN/1.73SQ M
GLUCOSE SERPL-MCNC: 122 MG/DL (ref 65–140)
GLUCOSE SERPL-MCNC: 123 MG/DL (ref 65–140)
GLUCOSE SERPL-MCNC: 126 MG/DL (ref 65–140)
GLUCOSE SERPL-MCNC: 161 MG/DL (ref 65–140)
GLUCOSE SERPL-MCNC: 165 MG/DL (ref 65–140)
GLUCOSE SERPL-MCNC: 172 MG/DL (ref 65–140)
HCG SERPL QL: NEGATIVE
HCT VFR BLD AUTO: 36.9 % (ref 34.8–46.1)
HGB BLD-MCNC: 11.1 G/DL (ref 11.5–15.4)
IMM GRANULOCYTES # BLD AUTO: 0.08 THOUSAND/UL (ref 0–0.2)
IMM GRANULOCYTES NFR BLD AUTO: 1 % (ref 0–2)
LYMPHOCYTES # BLD AUTO: 1.83 THOUSANDS/ΜL (ref 0.6–4.47)
LYMPHOCYTES NFR BLD AUTO: 25 % (ref 14–44)
MAGNESIUM SERPL-MCNC: 2.4 MG/DL (ref 1.6–2.6)
MCH RBC QN AUTO: 26.6 PG (ref 26.8–34.3)
MCHC RBC AUTO-ENTMCNC: 30.1 G/DL (ref 31.4–37.4)
MCV RBC AUTO: 88 FL (ref 82–98)
MONOCYTES # BLD AUTO: 0.88 THOUSAND/ΜL (ref 0.17–1.22)
MONOCYTES NFR BLD AUTO: 12 % (ref 4–12)
NEUTROPHILS # BLD AUTO: 4.44 THOUSANDS/ΜL (ref 1.85–7.62)
NEUTS SEG NFR BLD AUTO: 60 % (ref 43–75)
NRBC BLD AUTO-RTO: 0 /100 WBCS
PLATELET # BLD AUTO: 266 THOUSANDS/UL (ref 149–390)
PMV BLD AUTO: 10.3 FL (ref 8.9–12.7)
POTASSIUM SERPL-SCNC: 3.2 MMOL/L (ref 3.5–5.3)
PROCALCITONIN SERPL-MCNC: <0.05 NG/ML
RBC # BLD AUTO: 4.18 MILLION/UL (ref 3.81–5.12)
SODIUM SERPL-SCNC: 138 MMOL/L (ref 136–145)
WBC # BLD AUTO: 7.39 THOUSAND/UL (ref 4.31–10.16)

## 2021-03-11 PROCEDURE — 83735 ASSAY OF MAGNESIUM: CPT | Performed by: PHYSICIAN ASSISTANT

## 2021-03-11 PROCEDURE — 84145 PROCALCITONIN (PCT): CPT | Performed by: PHYSICIAN ASSISTANT

## 2021-03-11 PROCEDURE — 84703 CHORIONIC GONADOTROPIN ASSAY: CPT | Performed by: PODIATRIST

## 2021-03-11 PROCEDURE — 99232 SBSQ HOSP IP/OBS MODERATE 35: CPT | Performed by: PHYSICIAN ASSISTANT

## 2021-03-11 PROCEDURE — 82948 REAGENT STRIP/BLOOD GLUCOSE: CPT

## 2021-03-11 PROCEDURE — 99223 1ST HOSP IP/OBS HIGH 75: CPT | Performed by: INTERNAL MEDICINE

## 2021-03-11 PROCEDURE — 85025 COMPLETE CBC W/AUTO DIFF WBC: CPT | Performed by: INTERNAL MEDICINE

## 2021-03-11 PROCEDURE — 80048 BASIC METABOLIC PNL TOTAL CA: CPT | Performed by: INTERNAL MEDICINE

## 2021-03-11 RX ORDER — METRONIDAZOLE 500 MG/1
500 TABLET ORAL EVERY 8 HOURS SCHEDULED
Status: DISCONTINUED | OUTPATIENT
Start: 2021-03-11 | End: 2021-03-16

## 2021-03-11 RX ORDER — POTASSIUM CHLORIDE 20 MEQ/1
40 TABLET, EXTENDED RELEASE ORAL ONCE
Status: COMPLETED | OUTPATIENT
Start: 2021-03-11 | End: 2021-03-11

## 2021-03-11 RX ADMIN — LAMOTRIGINE 100 MG: 100 TABLET ORAL at 08:34

## 2021-03-11 RX ADMIN — GABAPENTIN 300 MG: 300 CAPSULE ORAL at 08:34

## 2021-03-11 RX ADMIN — INSULIN LISPRO 1 UNITS: 100 INJECTION, SOLUTION INTRAVENOUS; SUBCUTANEOUS at 21:40

## 2021-03-11 RX ADMIN — ATORVASTATIN CALCIUM 20 MG: 20 TABLET, FILM COATED ORAL at 17:32

## 2021-03-11 RX ADMIN — METRONIDAZOLE 500 MG: 500 TABLET, FILM COATED ORAL at 21:40

## 2021-03-11 RX ADMIN — CEFTAROLINE FOSAMIL 600 MG: 600 POWDER, FOR SOLUTION INTRAVENOUS at 12:32

## 2021-03-11 RX ADMIN — METRONIDAZOLE 500 MG: 500 TABLET, FILM COATED ORAL at 09:21

## 2021-03-11 RX ADMIN — INSULIN LISPRO 1 UNITS: 100 INJECTION, SOLUTION INTRAVENOUS; SUBCUTANEOUS at 17:33

## 2021-03-11 RX ADMIN — CEFAZOLIN SODIUM 1000 MG: 1 SOLUTION INTRAVENOUS at 05:49

## 2021-03-11 RX ADMIN — HEPARIN SODIUM 5000 UNITS: 5000 INJECTION INTRAVENOUS; SUBCUTANEOUS at 21:44

## 2021-03-11 RX ADMIN — POTASSIUM CHLORIDE 40 MEQ: 1500 TABLET, EXTENDED RELEASE ORAL at 09:21

## 2021-03-11 RX ADMIN — METRONIDAZOLE 500 MG: 500 TABLET, FILM COATED ORAL at 15:38

## 2021-03-11 RX ADMIN — HEPARIN SODIUM 5000 UNITS: 5000 INJECTION INTRAVENOUS; SUBCUTANEOUS at 13:34

## 2021-03-11 RX ADMIN — OXYBUTYNIN 10 MG: 5 TABLET, FILM COATED, EXTENDED RELEASE ORAL at 08:34

## 2021-03-11 NOTE — CASE MANAGEMENT
LOS: 1 DAY  PATIENT IS NOT A BUNDLE  PATIENT IS A 30 DAY READMISSION  Patient was inpatient at [facility] from 2/18/21 to 2/21/21 due to toe pain and sepsis  Patient refused HHC for wound care at that time  Her friend was going to assist with wound care  UNPLANNED READMISSION RISK SCORE IS 14 (GREEN/LOW)  Cm met with patient at bedside to complete general assessment and to discuss discharge planning  Patient reported that she still resides with her friend in a 2 story home with 3 [place]  Patient now ambulates with a cane and was IPTA with ADLs  Patient denied rehab and [address] Hx  Patient uses [facility] in [place], and her PCP is Yogi Rai also sees a psychiatrist, Dr Leiva Serum  Patient reported Hx of bipolar, anxiety, and depression  Patient denied SA Hx and does not have a POA  Her friend Zeenat Dawson is her HCR  Patient is unemployed and able to drive  Her friend Zeenat Dawson will provide transportation at discharge  CM reviewed discharge planning process including the following: identifying help at home, patient preference for discharge planning needs, pharmacy preference, and availability of treatment team to discuss questions or concerns patient and/or family may have regarding understanding medications and recognizing signs and symptoms once discharged  CM also encouraged patient to follow up with all recommended appointments after discharge  Patient advised of importance for patient and family to participate in managing patients medical well being  Cm reviewed the likelihood of a HHC recommendation  Patient agreeable and reported that she needs HHC  Patient denied any preferences  Cm sent referrals for [address] via [facility]  Cm department will continue to follow patient through discharge

## 2021-03-11 NOTE — H&P
[address] [facility] [address] 1990, 27 y o  female MRN: [id number]  Unit/Bed#: ED 06 Encounter: [id number]  Primary Care Provider: Pattie Marques PA-C   Date and time admitted to hospital: 3/10/2021  7:33 PM    * Osteomyelitis [facility])  Assessment & Plan  Noted on x-ray of the right great toe with superimposed cellulitis  Formal read pending  Was recently discharged approximately 3 weeks ago due to diabetic foot ulcers with surrounding cellulitis at that time no osteomyelitis was found  Continue with IV ceftriaxone  Podiatry consult for possible surgery  Follow-up blood cultures      Cellulitis of toe  Assessment & Plan  Continue antibiotics as outlined above    Seizure disorder [facility])  Assessment & Plan  Continue home Lamictal    Diabetes [facility])  Assessment & Plan  Lab Results   Component Value Date    HGBA1C 6 2 (H) 01/21/2021       No results for input(s): POCGLU in the last 72 hours  Blood Sugar Average: Last 72 hrs:   hold home p o  Agents  Sliding scale insulin    Hyperlipidemia  Assessment & Plan  Continue statin therapy      VTE Prophylaxis: Heparin  / sequential compression device   Code Status: full code  POLST: There is no POLST form on file for this patient (pre-hospital)  Discussion with family: pt    Anticipated Length of Stay:  Patient will be admitted on an Inpatient basis with an anticipated length of stay of  > 2 midnights  Justification for Hospital Stay:  Osteomyelitis    Total Time for Visit, including Counseling / Coordination of Care: 1 hour  Greater than 50% of this total time spent on direct patient counseling and coordination of care  Chief Complaint:   Erythema of the right great toe  History of Present Illness:    Matias Mosley is a 27 y o  female with past medical history significant of seizure disorder, type 2 diabetes initially presented with right toe redness  Patient has a history of bilateral diabetic foot wounds    Reports increasing redness and drainage in pain of the toe  Denies fevers, chills  Was recently admitted 3 weeks ago with foot cellulitis  Denies abdominal pain, nausea, vomiting, diarrhea, constipation, dysuria, headaches or any other symptoms  Review of Systems:    Review of Systems   Constitutional: Negative for activity change, appetite change, chills, diaphoresis, fever and unexpected weight change  HENT: Negative for congestion, facial swelling and rhinorrhea  Eyes: Negative for photophobia and visual disturbance  Respiratory: Negative for cough, shortness of breath and wheezing  Cardiovascular: Negative for chest pain and palpitations  Gastrointestinal: Negative for abdominal pain, blood in stool, constipation, diarrhea, nausea and vomiting  Genitourinary: Negative for decreased urine volume, difficulty urinating, dysuria, flank pain, frequency, hematuria and urgency  Musculoskeletal: Negative for arthralgias, back pain, joint swelling and myalgias  Skin: Positive for color change and wound  Neurological: Negative for dizziness, syncope, facial asymmetry, light-headedness, numbness and headaches  Psychiatric/Behavioral: Negative for confusion and decreased concentration  The patient is not nervous/anxious  Past Medical and Surgical History:     Past Medical History:   Diagnosis Date    Anxiety     [email or web address]     Depression     Diabetes mellitus ([email or web address] )     Epilepsy ([email or web address] )     Psychiatric disorder        Past Surgical History:   Procedure Laterality Date    OTHER SURGICAL HISTORY      Patient states she has surgery on "ear tubes", and bone spur in R hand, and 2nd toe on left foot    TONSILLECTOMY         Meds/Allergies:    Prior to Admission medications    Medication Sig Start Date End Date Taking?  Authorizing Provider   atorvastatin (LIPITOR) 20 mg tablet Take 20 mg by mouth    Historical Provider, MD   Dulaglutide (Trulicity) 1 5 EW/5 3OS SOPN Inject 1 5 mg under the skin once a week    Historical Provider, MD   Empagliflozin 10 MG TABS Take 10 mg by mouth    Historical Provider, MD   gabapentin (NEURONTIN) 300 mg capsule Take 300 mg by mouth    Historical Provider, MD   glipiZIDE (GLUCOTROL XL) 5 mg 24 hr tablet Take 5 mg by mouth daily    Historical Provider, MD   lamoTRIgine (LaMICtal) 100 mg tablet Take 100 mg by mouth    Historical Provider, MD   metFORMIN (GLUCOPHAGE) 1000 MG tablet Take 1,000 mg by mouth 2 (two) times a day with meals    Historical Provider, MD   Mirabegron ER 50 MG TB24 Take 50 mg by mouth    Historical Provider, MD   naproxen (NAPROSYN) 500 mg tablet Take 500 mg by mouth 2 (two) times a day with meals    Historical Provider, MD   topiramate (TOPAMAX) 100 mg tablet Take 100 mg by mouth Takes 2 tabs at bed time    Historical Provider, MD   ziprasidone (GEODON) 60 mg capsule Take 60 mg by mouth    Historical Provider, MD     I have reviewed home medications with patient personally  Allergies: Allergies   Allergen Reactions    Amoxicillin Itching    Macrobid [Nitrofurantoin] Itching    Vancomycin Rash       Social History:     Marital Status: Single     Patient Pre-hospital Living Situation: home  Patient Pre-hospital Level of Mobility: independent  Patient Pre-hospital Diet Restrictions: diabetic  Substance Use History:   Social History     Substance and Sexual Activity   Alcohol Use Never    Frequency: Never     Social History     Tobacco Use   Smoking Status Former Smoker    Quit date: 2020    Years since quittin 2   Smokeless Tobacco Never Used   Tobacco Comment    Pt reports smoking aprox 1 pk of cigarettes daily     Social History     Substance and Sexual Activity   Drug Use Never       Family History:    History reviewed  No pertinent family history      Physical Exam:     Vitals:   Blood Pressure: 113/60 (03/10/21 1940)  Pulse: (!) 108 (03/10/21 1940)  Temperature: 99 8 °F (37 7 °C) (03/10/21 1940)  Temp Source: Oral (03/10/21 1940)  Respirations: 18 (03/10/21 1940)  SpO2: 96 % (03/10/21 1940)    Physical Exam  Constitutional:       General: She is not in acute distress  Appearance: She is well-developed  She is not diaphoretic  HENT:      Head: Normocephalic and atraumatic  Nose: Nose normal       Mouth/Throat:      Pharynx: No oropharyngeal exudate  Eyes:      General: No scleral icterus  Right eye: No discharge  Left eye: No discharge  Conjunctiva/sclera: Conjunctivae normal       Pupils: Pupils are equal, round, and reactive to light  Neck:      Musculoskeletal: Normal range of motion and neck supple  Thyroid: No thyromegaly  Vascular: No JVD  Cardiovascular:      Rate and Rhythm: Normal rate and regular rhythm  Heart sounds: Normal heart sounds  No murmur  No friction rub  No gallop  Pulmonary:      Effort: Pulmonary effort is normal  No respiratory distress  Breath sounds: Normal breath sounds  No wheezing or rales  Chest:      Chest wall: No tenderness  Abdominal:      General: Bowel sounds are normal  There is no distension  Palpations: Abdomen is soft  Tenderness: There is no abdominal tenderness  There is no guarding or rebound  Musculoskeletal: Normal range of motion  General: No tenderness or deformity  Skin:     General: Skin is warm and dry  Findings: Erythema present  No rash  Neurological:      Mental Status: She is alert and oriented to person, place, and time  Cranial Nerves: No cranial nerve deficit  Sensory: No sensory deficit  Motor: No abnormal muscle tone  Coordination: Coordination normal          Additional Data:     Lab Results: I have personally reviewed pertinent reports        Results from last 7 days   Lab Units 03/10/21  2015   WBC Thousand/uL 10 03   HEMOGLOBIN g/dL 11 5   HEMATOCRIT % 38 4   PLATELETS Thousands/uL 295   NEUTROS PCT % 69   LYMPHS PCT % 18   MONOS PCT % 11   EOS PCT % 1     Results from last 7 days   Lab Units 03/10/21  2015   SODIUM mmol/L 138   POTASSIUM mmol/L 3 6   CHLORIDE mmol/L 101   CO2 mmol/L 23   BUN mg/dL 9   CREATININE mg/dL 0 79   ANION GAP mmol/L 14*   CALCIUM mg/dL 8 7   ALBUMIN g/dL 2 9*   TOTAL BILIRUBIN mg/dL 0 68   ALK PHOS U/L 97   ALT U/L 21   AST U/L 15   GLUCOSE RANDOM mg/dL 167*     Results from last 7 days   Lab Units 03/10/21  2014   INR  1 02             Results from last 7 days   Lab Units 03/10/21  2015   LACTIC ACID mmol/L 1 9       Imaging: I have personally reviewed pertinent reports  XR toe great min 2 views RIGHT   ED Interpretation by Dominique Tello PA-C (03/10 2045)   Bone destruction and fracture          EKG, Pathology, and Other Studies Reviewed on Admission:   · EKG: reviewed    Allscripts / Epic Records Reviewed: Yes     ** Please Note: This note has been constructed using a voice recognition system   **

## 2021-03-11 NOTE — PLAN OF CARE
Problem: PAIN - ADULT  Goal: Verbalizes/displays adequate comfort level or baseline comfort level  Description: Interventions:  - Encourage patient to monitor pain and request assistance  - Assess pain using appropriate pain scale  - Administer analgesics based on type and severity of pain and evaluate response  - Implement non-pharmacological measures as appropriate and evaluate response  - Consider cultural and social influences on pain and pain management  - Notify physician/advanced practitioner if interventions unsuccessful or patient reports new pain  Outcome: Progressing     Problem: INFECTION - ADULT  Goal: Absence or prevention of progression during hospitalization  Description: INTERVENTIONS:  - Assess and monitor for signs and symptoms of infection  - Monitor lab/diagnostic results  - Monitor all insertion sites, i e  indwelling lines, tubes, and drains  - Monitor endotracheal if appropriate and nasal secretions for changes in amount and color  - [place] appropriate cooling/warming therapies per order  - Administer medications as ordered  - Instruct and encourage patient and family to use good hand hygiene technique  - Identify and instruct in appropriate isolation precautions for identified infection/condition  Outcome: Progressing  Goal: Absence of fever/infection during neutropenic period  Description: INTERVENTIONS:  - Monitor WBC    Outcome: Progressing     Problem: SAFETY ADULT  Goal: Patient will remain free of falls  Description: INTERVENTIONS:  - Assess patient frequently for physical needs  -  Identify cognitive and physical deficits and behaviors that affect risk of falls    -  [place] fall precautions as indicated by assessment   - Educate patient/family on patient safety including physical limitations  - Instruct patient to call for assistance with activity based on assessment  - Modify environment to reduce risk of injury  - Consider OT/PT consult to assist with strengthening/mobility  Outcome: Progressing  Goal: Maintain or return to baseline ADL function  Description: INTERVENTIONS:  -  Assess patient's ability to carry out ADLs; assess patient's baseline for ADL function and identify physical deficits which impact ability to perform ADLs (bathing, care of mouth/teeth, toileting, grooming, dressing, etc )  - Assess/evaluate cause of self-care deficits   - Assess range of motion  - Assess patient's mobility; develop plan if impaired  - Assess patient's need for assistive devices and provide as appropriate  - Encourage maximum independence but intervene and supervise when necessary  - Involve family in performance of ADLs  - Assess for home care needs following discharge   - Consider OT consult to assist with ADL evaluation and planning for discharge  - Provide patient education as appropriate  Outcome: Progressing  Goal: Maintain or return mobility status to optimal level  Description: INTERVENTIONS:  - Assess patient's baseline mobility status (ambulation, transfers, stairs, etc )    - Identify cognitive and physical deficits and behaviors that affect mobility  - Identify mobility aids required to assist with transfers and/or ambulation (gait belt, sit-to-stand, lift, walker, cane, etc )  - [place] fall precautions as indicated by assessment  - Record patient progress and toleration of activity level on Mobility SBAR; progress patient to next Phase/Stage  - Instruct patient to call for assistance with activity based on assessment  - Consider rehabilitation consult to assist with strengthening/weightbearing, etc   Outcome: Progressing     Problem: DISCHARGE PLANNING  Goal: Discharge to home or other facility with appropriate resources  Description: INTERVENTIONS:  - Identify barriers to discharge w/patient and caregiver  - Arrange for needed discharge resources and transportation as appropriate  - Identify discharge learning needs (meds, wound care, etc )  - Arrange for interpretive services to assist at discharge as needed  - Refer to Case Management Department for coordinating discharge planning if the patient needs post-hospital services based on physician/advanced practitioner order or complex needs related to functional status, cognitive ability, or social support system  Outcome: Progressing     Problem: Knowledge Deficit  Goal: Patient/family/caregiver demonstrates understanding of disease process, treatment plan, medications, and discharge instructions  Description: Complete learning assessment and assess knowledge base  Interventions:  - Provide teaching at level of understanding  - Provide teaching via preferred learning methods  Outcome: Progressing     Problem: Potential for Falls  Goal: Patient will remain free of falls  Description: INTERVENTIONS:  - Assess patient frequently for physical needs  -  Identify cognitive and physical deficits and behaviors that affect risk of falls    -  [place] fall precautions as indicated by assessment   - Educate patient/family on patient safety including physical limitations  - Instruct patient to call for assistance with activity based on assessment  - Modify environment to reduce risk of injury  - Consider OT/PT consult to assist with strengthening/mobility  Outcome: Progressing

## 2021-03-11 NOTE — ASSESSMENT & PLAN NOTE
Noted on x-ray of the right great toe with superimposed cellulitis  Formal read pending  Was recently discharged approximately 3 weeks ago due to diabetic foot ulcers with surrounding cellulitis at that time no osteomyelitis was found  Continue with IV ceftriaxone  Podiatry consult for possible surgery  Follow-up blood cultures

## 2021-03-11 NOTE — CONSULTS
Consultation - Blake Gastelum Darryl 27 y o  female MRN: [id number]  Unit/Bed#: -01 Encounter: [id number]    Assessment/Plan     Assessment:  1  Cellulitis right foot  2   Non-pressure ulcer bilateral foot to fat  3   DM with DN  Plan:      History of Present Illness   HPI:  Abby Chaudhary is a 27 y o  female who presents with a wound located at bilateral great toes  The wounds were present since at least the beginning of this year  She did not see podiatry outpatient due to lack of transportation  She states the toes are numb but there may be some soreness in the right great toe  Consults    Review of Systems   GEN:  No fever or chills  Eyes:  No blurred vision  Heart:  No chest pain  Lungs:  No sob  GI:  No nausea  Ext:  Open wounds  Neuro:  Numbness of toes  Psych:  No agitation  :  No frequent urination  Vasc;  No dvt    Historical Information   Past Medical History:   Diagnosis Date    Anxiety     Bipolar disorder ([facility] )     Depression     Diabetes mellitus ([facility] )     Epilepsy ([place] [address] )     Psychiatric disorder      Past Surgical History:   Procedure Laterality Date    OTHER SURGICAL HISTORY      Patient states she has surgery on "ear tubes", and bone spur in R hand, and 2nd toe on left foot    TONSILLECTOMY       Social History   Social History     Substance and Sexual Activity   Alcohol Use Never    Frequency: Never     Social History     Substance and Sexual Activity   Drug Use Never     E-Cigarette/Vaping    E-Cigarette Use Never User      E-Cigarette/Vaping Substances    Nicotine No      Social History     Tobacco Use   Smoking Status Former Smoker    Quit date: 2020    Years since quittin 2   Smokeless Tobacco Never Used   Tobacco Comment    Pt reports smoking aprox 1 pk of cigarettes daily     Family History: History reviewed  No pertinent family history      Meds/Allergies   current meds:   Current Facility-Administered Medications   Medication Dose Route Frequency    acetaminophen (TYLENOL) tablet 650 mg  650 mg Oral Q6H PRN    atorvastatin (LIPITOR) tablet 20 mg  20 mg Oral Daily With Dinner    ceftaroline (TEFLARO) 600 mg in sodium chloride 0 9 % 50 mL IVPB  600 mg Intravenous Q12H    gabapentin (NEURONTIN) capsule 300 mg  300 mg Oral Daily    heparin (porcine) subcutaneous injection 5,000 Units  5,000 Units Subcutaneous Q8H [address]    HYDROmorphone (DILAUDID) injection 0 5 mg  0 5 mg Intravenous Q4H PRN    insulin lispro (HumaLOG) 100 units/mL subcutaneous injection 1-5 Units  1-5 Units Subcutaneous HS    insulin lispro (HumaLOG) 100 units/mL subcutaneous injection 1-6 Units  1-6 Units Subcutaneous TID AC    lamoTRIgine (LaMICtal) tablet 100 mg  100 mg Oral Daily    metroNIDAZOLE (FLAGYL) tablet 500 mg  500 mg Oral Q8H [address]    ondansetron (ZOFRAN) injection 4 mg  4 mg Intravenous Q6H PRN    oxybutynin (DITROPAN-XL) 24 hr tablet 10 mg  10 mg Oral Daily    oxyCODONE (ROXICODONE) immediate release tablet 10 mg  10 mg Oral Q4H PRN    oxyCODONE (ROXICODONE) IR tablet 5 mg  5 mg Oral Q4H PRN    topiramate (TOPAMAX) tablet 200 mg  200 mg Oral Daily     Allergies   Allergen Reactions    Amoxicillin Itching    Macrobid [Nitrofurantoin] Itching    Vancomycin Rash       Objective   Vitals: Blood pressure 128/67, pulse 87, temperature 97 9 °F (36 6 °C), resp  rate 18, height 5' 5" (1 651 m), weight 115 kg (253 lb 8 5 oz), SpO2 94 %  Wounds:     Wound 01/30/21 Diabetic Ulcer Toe (Comment  which one) Posterior;Medial;Lateral;Right (Active)   Wound Description Pink;Yellow;Fragile;Drainage 03/11/21 1100   Justa-wound Assessment Fragile;Dry 03/11/21 1100   Drainage Amount Small 03/11/21 1100   Drainage Description Yellow; Foul smelling 03/11/21 1100   Treatments Irrigation with NSS;Cleansed 03/11/21 1100   Dressing Calcium Alginate;Gauze 03/11/21 1100   Wound packed?  No 03/11/21 1100   Dressing Changed Changed 03/11/21 1100   Patient Tolerance Tolerated well 03/11/21 1100   Dressing Status Clean;Dry; Intact 03/11/21 1100       Wound 01/30/21 Diabetic Ulcer Toe (Comment  which one) Posterior; Left (Active)   Wound Description Yellow;Fragile;Pink;Drainage 03/11/21 1100   Justa-wound Assessment Dry 03/11/21 1100   Drainage Amount Small 03/11/21 1100   Drainage Description Yellow 03/11/21 1100   Treatments Cleansed;Irrigation with NSS 03/11/21 1100   Dressing Calcium Alginate;Gauze; Other (Comment) 03/11/21 1100   Wound packed? No 03/11/21 1100   Dressing Changed Changed 03/11/21 1100   Patient Tolerance Tolerated well 03/11/21 1100   Dressing Status Clean;Dry; Intact 03/11/21 1100         Physical Exam   GEN:  Awake and alert  Vasc:  DP and PT pulses palpable  Feet are warm  No leg edema bilateral     SKIN:  Ulcer plantar great toe bilateral to fat level  Gross infection right foot ulcer with severe redness and edema of the great toe  NEURO:  Decreased sensation of the toes bilateral   Motor function intact bilateral,  ORTHO:  Hallux limitus bilateral     Lab Results: I have personally reviewed pertinent reports  and I have personally reviewed pertinent films in PACS  Imaging:   RIGHT TOES  INDICATION:   toe pain and swelling  COMPARISON:  2/18/2021  VIEWS:  XR TOE RIGHT GREAT MIN 2 VIEW   FINDINGS:  There is ill-defined lucency at the base of the 1st distal phalanx with an associated oblique intra-articular pathologic fracture  No significant degenerative changes  Soft tissue swelling in the great toe and plantar irregularity  No gas  No radiopaque foreign body  IMPRESSION:  Findings concerning for osteomyelitis of the right great toe distal phalanx and an associated oblique intra-articular pathologic fracture  This report is concordant with Wen Frias PA-C's preliminary interpretation that is documented in the electronic medical record (EPIC)    Workstation performed: [id number]  EKG, Pathology, and Other Studies:     Code Status: Level 1 - Full Code  Advance Directive and Living Will:      Power of :    POLST:      Counseling / [address] was spent with the patient and / or family counseling and / or coordination of care  A description of the counseling / coordination of care: need for MRI and likely toe or partial first ray amputation

## 2021-03-11 NOTE — PLAN OF CARE
Problem: PAIN - ADULT  Goal: Verbalizes/displays adequate comfort level or baseline comfort level  Description: Interventions:  - Encourage patient to monitor pain and request assistance  - Assess pain using appropriate pain scale  - Administer analgesics based on type and severity of pain and evaluate response  - Implement non-pharmacological measures as appropriate and evaluate response  - Consider cultural and social influences on pain and pain management  - Notify physician/advanced practitioner if interventions unsuccessful or patient reports new pain  Outcome: Progressing     Problem: INFECTION - ADULT  Goal: Absence or prevention of progression during hospitalization  Description: INTERVENTIONS:  - Assess and monitor for signs and symptoms of infection  - Monitor lab/diagnostic results  - Monitor all insertion sites, i e  indwelling lines, tubes, and drains  - Monitor endotracheal if appropriate and nasal secretions for changes in amount and color  - [place] appropriate cooling/warming therapies per order  - Administer medications as ordered  - Instruct and encourage patient and family to use good hand hygiene technique  - Identify and instruct in appropriate isolation precautions for identified infection/condition  Outcome: Progressing  Goal: Absence of fever/infection during neutropenic period  Description: INTERVENTIONS:  - Monitor WBC    Outcome: Progressing     Problem: SAFETY ADULT  Goal: Patient will remain free of falls  Description: INTERVENTIONS:  - Assess patient frequently for physical needs  -  Identify cognitive and physical deficits and behaviors that affect risk of falls    -  [place] fall precautions as indicated by assessment   - Educate patient/family on patient safety including physical limitations  - Instruct patient to call for assistance with activity based on assessment  - Modify environment to reduce risk of injury  - Consider OT/PT consult to assist with strengthening/mobility  Outcome: Progressing  Goal: Maintain or return to baseline ADL function  Description: INTERVENTIONS:  -  Assess patient's ability to carry out ADLs; assess patient's baseline for ADL function and identify physical deficits which impact ability to perform ADLs (bathing, care of mouth/teeth, toileting, grooming, dressing, etc )  - Assess/evaluate cause of self-care deficits   - Assess range of motion  - Assess patient's mobility; develop plan if impaired  - Assess patient's need for assistive devices and provide as appropriate  - Encourage maximum independence but intervene and supervise when necessary  - Involve family in performance of ADLs  - Assess for home care needs following discharge   - Consider OT consult to assist with ADL evaluation and planning for discharge  - Provide patient education as appropriate  Outcome: Progressing  Goal: Maintain or return mobility status to optimal level  Description: INTERVENTIONS:  - Assess patient's baseline mobility status (ambulation, transfers, stairs, etc )    - Identify cognitive and physical deficits and behaviors that affect mobility  - Identify mobility aids required to assist with transfers and/or ambulation (gait belt, sit-to-stand, lift, walker, cane, etc )  - [place] fall precautions as indicated by assessment  - Record patient progress and toleration of activity level on Mobility SBAR; progress patient to next Phase/Stage  - Instruct patient to call for assistance with activity based on assessment  - Consider rehabilitation consult to assist with strengthening/weightbearing, etc   Outcome: Progressing     Problem: DISCHARGE PLANNING  Goal: Discharge to home or other facility with appropriate resources  Description: INTERVENTIONS:  - Identify barriers to discharge w/patient and caregiver  - Arrange for needed discharge resources and transportation as appropriate  - Identify discharge learning needs (meds, wound care, etc )  - Arrange for interpretive services to assist at discharge as needed  - Refer to Case Management Department for coordinating discharge planning if the patient needs post-hospital services based on physician/advanced practitioner order or complex needs related to functional status, cognitive ability, or social support system  Outcome: Progressing     Problem: Knowledge Deficit  Goal: Patient/family/caregiver demonstrates understanding of disease process, treatment plan, medications, and discharge instructions  Description: Complete learning assessment and assess knowledge base    Interventions:  - Provide teaching at level of understanding  - Provide teaching via preferred learning methods  Outcome: Progressing

## 2021-03-11 NOTE — CONSULTS
Consultation - Infectious Disease   Lisa Shay 27 y o  female MRN: [id number]  Unit/Bed#: -01 Encounter: [id number]      IMPRESSION & RECOMMENDATIONS:   Impression/Recommendations:  1  Right hallux chronic ulcer with superinfection  Also osteomyelitis distal phalanx with intra-articular pathologic fracture  Recent wound cultures with MRSA, GGS, anaerobes  Blood cultures negative  Clinically stable without sepsis  Rec:  · Change antibiotics to ceftaroline/Flagyl  · Follow up repeat wound   · Follow up blood cultures from admission  · Await Podiatry evaluation - suspect patient will need toe amputation  · Follow temperatures closely  · Check CBC in AM  · Supportive care as per the primary service    2  DM with DPN  Recent A1c 6 2  Risk factor for above    3  Bipolar disorder  Recent hospitalization for depression  Risk factor for poor self care  The above impression and plan was discussed in detail with [address] [place], [place] with [facility]  Antibiotics:  Cefazolin #1  Antibiotics #2    Thank you for this consultation  We will follow along with you  HISTORY OF PRESENT ILLNESS:  Reason for Consult: Osteomyelitis    HPI: Lisa Shay is a 27 y o  female with DM, bipolar disorder  With a recent admission for depression and suicidal ideation  In January of this year she developed bilateral superficial ulcers lowers over her great toes  She had a brief hospitalization from 01/19/2021 through 01/22/2021 for superinfection  She was treated with IV antibiotics  Wound cultures grew MSSA  And group B strep  She was discharged on Keflex with instructions  She was admitted again in mid February for worsening of these ulcers with sepsis and superinfection  Wound cultures grew group G strep, MRSA, and anaerobes  She was again treated with Ancef and discharged with Keflex  An x-ray at that time showed no osteomyelitis    She returned to the emergency department on 03/10 for increased redness and drainage especially on the right  Upon presentation she was noted to be afebrile but had tachycardia  Her labs revealed a normal WBC  She underwent an x-ray of the right foot which revealed osteomyelitis of the   Hallux  She was given doses of clindamycin and ceftriaxone and was started on cefazolin  We are asked to comment on further evaluation and management  In performing this consult, I have reviewed prior admission and outpatient visit records in detail  REVIEW OF SYSTEMS:  Denies fevers, chills, sweats, nausea, vomiting, or diarrhea  A complete system-based review of systems is otherwise negative  PAST MEDICAL HISTORY:  Past Medical History:   Diagnosis Date    Anxiety     Bipolar disorder ([facility] )     Depression     Diabetes mellitus ([facility] )     Epilepsy ([place] [address] )     Psychiatric disorder      Past Surgical History:   Procedure Laterality Date    OTHER SURGICAL HISTORY      Patient states she has surgery on "ear tubes", and bone spur in R hand, and 2nd toe on left foot    TONSILLECTOMY         FAMILY HISTORY:  Non-contributory    SOCIAL HISTORY:  Social History     Substance and Sexual Activity   Alcohol Use Never    Frequency: Never     Social History     Substance and Sexual Activity   Drug Use Never     Social History     Tobacco Use   Smoking Status Former Smoker    Quit date: 2020    Years since quittin 2   Smokeless Tobacco Never Used   Tobacco Comment    Pt reports smoking aprox 1 pk of cigarettes daily       ALLERGIES:  Allergies   Allergen Reactions    Amoxicillin Itching    Macrobid [Nitrofurantoin] Itching    Vancomycin Rash       MEDICATIONS:  All current active medications have been reviewed      PHYSICAL EXAM:  Vitals:  Temp:  [97 9 °F (36 6 °C)-99 8 °F (37 7 °C)] 97 9 °F (36 6 °C)  HR:  [] 87  Resp:  [16-18] 18  BP: (110-128)/(56-70) 128/67  SpO2:  [93 %-99 %] 94 %  Temp (24hrs), Av 7 °F (37 1 °C), Min:97 9 °F (36 6 °C), Max:99 8 °F (37 7 °C)  Current: Temperature: 97 9 °F (36 6 °C)     Physical Exam:  General:  Well-nourished, well-developed, in no acute distress  Eyes:  Conjunctive clear with no hemorrhages or effusions  Oropharynx:  No ulcers, no lesions  Neck:  Supple, no lymphadenopathy  Lungs:  Clear to auscultation bilaterally, no accessory muscle use  Cardiac:  Regular rate and rhythm, no murmurs  Abdomen:  Soft, non-tender, non-distended  Extremities:  No peripheral cyanosis, clubbing, or edema  Skin:  No rashes, no ulcers  Neurological:  Moves all four extremities spontaneously, sensation grossly intact  Left foot:  Deep ulcer left hallux without cellulitis or purulence  Right foot:  Deep ulcer right hallux with diffuse erythema and tenderness of entire toe but no extension to foot  LABS, IMAGING, & OTHER STUDIES:  Lab Results:  I have personally reviewed pertinent labs  Results from last 7 days   Lab Units 03/11/21  0525 03/10/21  2015   POTASSIUM mmol/L 3 2* 3 6   CHLORIDE mmol/L 104 101   CO2 mmol/L 22 23   BUN mg/dL 7 9   CREATININE mg/dL 0 66 0 79   EGFR ml/min/1 73sq m 119 101   CALCIUM mg/dL 8 3 8 7   AST U/L  --  15   ALT U/L  --  21   ALK PHOS U/L  --  97     Results from last 7 days   Lab Units 03/11/21  0525 03/10/21  2015   WBC Thousand/uL 7 39 10 03   HEMOGLOBIN g/dL 11 1* 11 5   PLATELETS Thousands/uL 266 295     Results from last 7 days   Lab Units 03/10/21  2258 03/10/21  2048 03/10/21  2014   BLOOD CULTURE   --  Received in Microbiology Lab  Culture in Progress  Received in Microbiology Lab  Culture in Progress  GRAM STAIN RESULT  No Polys or Bacteria seen  --   --        Imaging Studies:   I have personally reviewed pertinent imaging study reports and images in PACS  Xray right foot reviewed personally osteomyelitis right distal phalanx with intra-articular pathologic fracture    EKG, Pathology, and Other Studies:   I have personally reviewed pertinent reports

## 2021-03-11 NOTE — ASSESSMENT & PLAN NOTE
· Noted on x-ray of the right great toe with superimposed cellulitis: Formal read pending  · Was recently discharged approximately 3 weeks ago due to diabetic foot ulcers with surrounding cellulitis at that time no osteomyelitis was found  · Continue with IV ceftriaxone  · Podiatry consult for possible surgery  · Reviewed cultures from previous admission which does show 2+ MRSA, 3+ beta hemolytic strep group G, anaerobic with 4+ mixed aerobic/anaerobic (presumptive Prevotella species)  · Consult Infectious Disease, MRSA resistant to clindamycin and patient has a vancomycin allergy  · Consider gentamicin  · Add flagyl for anaerobic coverage

## 2021-03-11 NOTE — ASSESSMENT & PLAN NOTE
Lab Results   Component Value Date    HGBA1C 6 2 (H) 01/21/2021       No results for input(s): POCGLU in the last 72 hours  Blood Sugar Average: Last 72 hrs:   hold home p o   Agents  Sliding scale insulin

## 2021-03-11 NOTE — ED PROVIDER NOTES
History  Chief Complaint   Patient presents with    Wound Check     reports bilateral diabetic foot wounds  reports she has not been under the care of a provider for the wounds  reprots redness/drainage and pain to site  pt also reports she has not been checking her BG at home  28 yo female with right great toe redness  She reports it started today  She has had recurrent cellulitis of this toe but no h/o osteo  She is a diabetic controlled with PO meds  History provided by:  Patient   used: No    Toe Pain  Location:  Right great toe  Quality:  Pain, redness  Severity:  Moderate  Onset quality:  Sudden  Duration:  1 day  Timing:  Constant  Progression:  Unchanged  Chronicity:  Recurrent  Associated symptoms: fever    Associated symptoms: no abdominal pain, no chest pain, no congestion, no cough, no diarrhea, no fatigue, no headaches, no myalgias, no nausea, no rash, no rhinorrhea, no shortness of breath, no sore throat, no vomiting and no wheezing        Prior to Admission Medications   Prescriptions Last Dose Informant Patient Reported? Taking?    Dulaglutide (Trulicity) 1 5 NZ/4 7VA SOPN  Self Yes No   Sig: Inject 1 5 mg under the skin once a week   Empagliflozin 10 MG TABS   Yes No   Sig: Take 10 mg by mouth   Mirabegron ER 50 MG TB24   Yes No   Sig: Take 50 mg by mouth   atorvastatin (LIPITOR) 20 mg tablet   Yes No   Sig: Take 20 mg by mouth   gabapentin (NEURONTIN) 300 mg capsule   Yes No   Sig: Take 300 mg by mouth   glipiZIDE (GLUCOTROL XL) 5 mg 24 hr tablet   Yes No   Sig: Take 5 mg by mouth daily   lamoTRIgine (LaMICtal) 100 mg tablet   Yes No   Sig: Take 100 mg by mouth   metFORMIN (GLUCOPHAGE) 1000 MG tablet   Yes No   Sig: Take 1,000 mg by mouth 2 (two) times a day with meals   naproxen (NAPROSYN) 500 mg tablet   Yes No   Sig: Take 500 mg by mouth 2 (two) times a day with meals   topiramate (TOPAMAX) 100 mg tablet   Yes No   Sig: Take 100 mg by mouth Takes 2 tabs at bed time ziprasidone (GEODON) 60 mg capsule   Yes No   Sig: Take 60 mg by mouth      Facility-Administered Medications: None       Past Medical History:   Diagnosis Date    Anxiety     [facility]ipolar disorder ([facility] )     Depression     Diabetes mellitus ([facility] )     Epilepsy ([facility] )     Psychiatric disorder        Past Surgical History:   Procedure Laterality Date    OTHER SURGICAL HISTORY      Patient states she has surgery on "ear tubes", and bone spur in R hand, and 2nd toe on left foot    TONSILLECTOMY         History reviewed  No pertinent family history  I have reviewed and agree with the history as documented  E-Cigarette/Vaping    E-Cigarette Use Never User      E-Cigarette/Vaping Substances    Nicotine No      Social History     Tobacco Use    Smoking status: Former Smoker     Quit date: 2020     Years since quittin 2    Smokeless tobacco: Never Used    Tobacco comment: Pt reports smoking aprox 1 pk of cigarettes daily   Substance Use Topics    Alcohol use: Never     Frequency: Never    Drug use: Never       Review of Systems   Constitutional: Positive for chills and fever  Negative for activity change, appetite change, diaphoresis, fatigue and unexpected weight change  HENT: Negative for congestion, rhinorrhea, sinus pressure, sore throat and trouble swallowing  Eyes: Negative for photophobia and visual disturbance  Respiratory: Negative for apnea, cough, choking, chest tightness, shortness of breath, wheezing and stridor  Cardiovascular: Negative for chest pain, palpitations and leg swelling  Gastrointestinal: Negative for abdominal distention, abdominal pain, blood in stool, constipation, diarrhea, nausea and vomiting  Genitourinary: Negative for decreased urine volume, difficulty urinating, dysuria, enuresis, flank pain, frequency, hematuria and urgency  Musculoskeletal: Negative for arthralgias, myalgias, neck pain and neck stiffness          Right great toe pain   Skin: Negative for color change, pallor, rash and wound  Allergic/Immunologic: Negative  Neurological: Negative for dizziness, tremors, syncope, weakness, light-headedness, numbness and headaches  Hematological: Negative  Psychiatric/Behavioral: Negative  All other systems reviewed and are negative  Physical Exam  Physical Exam  Vitals signs and nursing note reviewed  Constitutional:       General: She is not in acute distress  Appearance: Normal appearance  She is well-developed  She is not ill-appearing, toxic-appearing or diaphoretic  HENT:      Head: Normocephalic and atraumatic  Eyes:      General: Lids are normal       Pupils: Pupils are equal, round, and reactive to light  Neck:      Musculoskeletal: Normal range of motion and neck supple  Cardiovascular:      Rate and Rhythm: Normal rate and regular rhythm  Pulses: Normal pulses  No decreased pulses  Radial pulses are 2+ on the right side and 2+ on the left side  Heart sounds: Normal heart sounds, S1 normal and S2 normal  Heart sounds not distant  No murmur  No friction rub  No gallop  Pulmonary:      Effort: Pulmonary effort is normal  No tachypnea, bradypnea, accessory muscle usage or respiratory distress  Breath sounds: Normal breath sounds  No decreased breath sounds, wheezing, rhonchi or rales  Abdominal:      General: There is no distension  Palpations: Abdomen is soft  Abdomen is not rigid  Tenderness: There is no abdominal tenderness  There is no guarding or rebound  Musculoskeletal: Normal range of motion  General: No tenderness or deformity  Feet:      Comments: Right great toe is erythematous  Diffusely swollen  Ulcer on distal pad  Tenderness  No crepitus  Skin:     General: Skin is warm and dry  Coloration: Skin is not pale  Findings: No erythema or rash  Neurological:      Mental Status: She is alert and oriented to person, place, and time        GCS: GCS eye subscore is 4  GCS verbal subscore is 5  GCS motor subscore is 6  Cranial Nerves: No cranial nerve deficit  Psychiatric:         Speech: Speech normal          Vital Signs  ED Triage Vitals [03/10/21 1940]   Temperature Pulse Respirations Blood Pressure SpO2   99 8 °F (37 7 °C) (!) 108 18 113/60 96 %      Temp Source Heart Rate Source Patient Position - Orthostatic VS BP Location FiO2 (%)   Oral -- Sitting Right arm --      Pain Score       --           Vitals:    03/10/21 1940   BP: 113/60   Pulse: (!) 108   Patient Position - Orthostatic VS: Sitting         Visual Acuity      ED Medications  Medications   cefTRIAXone (ROCEPHIN) 2,000 mg in dextrose 5 % 50 mL IVPB (2,000 mg Intravenous New Bag 3/10/21 2051)   clindamycin (CLEOCIN) IVPB (premix in dextrose) 600 mg 50 mL (has no administration in time range)       Diagnostic Studies  Results Reviewed     Procedure Component Value Units Date/Time    Blood culture #1 [[id number]] Collected: 03/10/21 2048    Lab Status:  In process Specimen: Blood Updated: 03/10/21 2048    Comprehensive metabolic panel [[id number]]  (Abnormal) Collected: 03/10/21 2015    Lab Status: Final result Specimen: Blood from Arm, Left Updated: 03/10/21 2047     Sodium 138 mmol/L      Potassium 3 6 mmol/L      Chloride 101 mmol/L      CO2 23 mmol/L      ANION GAP 14 mmol/L      BUN 9 mg/dL      Creatinine 0 79 mg/dL      Glucose 167 mg/dL      Calcium 8 7 mg/dL      Corrected Calcium 9 6 mg/dL      AST 15 U/L      ALT 21 U/L      Alkaline Phosphatase 97 U/L      Total Protein 8 3 g/dL      Albumin 2 9 g/dL      Total Bilirubin 0 68 mg/dL      eGFR 101 ml/min/1 73sq m     Narrative:      [place] guidelines for Chronic Kidney Disease (CKD):     Stage 1 with normal or high GFR (GFR > 90 mL/min/1 73 square meters)    Stage 2 Mild CKD (GFR = 60-89 mL/min/1 73 square meters)    Stage 3A Moderate CKD (GFR = 45-59 mL/min/1 73 square meters)    Stage 3B Moderate CKD (GFR = 30-44 mL/min/1 73 square meters)    Stage 4 Severe CKD (GFR = 15-29 mL/min/1 73 square meters)    Stage 5 End Stage CKD (GFR <15 mL/min/1 73 square meters)  Note: GFR calculation is accurate only with a steady state creatinine    Lactic acid [[id number]]  (Normal) Collected: 03/10/21 2015    Lab Status: Final result Specimen: Blood from Arm, Left Updated: 03/10/21 2041     LACTIC ACID 1 9 mmol/L     Narrative:      Result may be elevated if tourniquet was used during collection  Protime-INR [[id number]]  (Normal) Collected: 03/10/21 2014    Lab Status: Final result Specimen: Blood from Arm, Left Updated: 03/10/21 2033     Protime 13 6 seconds      INR 1 02    APTT [[id number]]  (Normal) Collected: 03/10/21 2014    Lab Status: Final result Specimen: Blood from Arm, Left Updated: 03/10/21 2033     PTT 31 seconds     CBC and differential [[id number]]  (Abnormal) Collected: 03/10/21 2015    Lab Status: Final result Specimen: Blood from Arm, Left Updated: 03/10/21 2026     WBC 10 03 Thousand/uL      RBC 4 45 Million/uL      Hemoglobin 11 5 g/dL      Hematocrit 38 4 %      MCV 86 fL      MCH 25 8 pg      MCHC 29 9 g/dL      RDW 13 5 %      MPV 9 9 fL      Platelets 005 Thousands/uL      nRBC 0 /100 WBCs      Neutrophils Relative 69 %      Immat GRANS % 1 %      Lymphocytes Relative 18 %      Monocytes Relative 11 %      Eosinophils Relative 1 %      Basophils Relative 0 %      Neutrophils Absolute 6 89 Thousands/µL      Immature Grans Absolute 0 06 Thousand/uL      Lymphocytes Absolute 1 78 Thousands/µL      Monocytes Absolute 1 13 Thousand/µL      Eosinophils Absolute 0 14 Thousand/µL      Basophils Absolute 0 03 Thousands/µL     Procalcitonin with AM Reflex [[id number]] Collected: 03/10/21 2014    Lab Status: In process Specimen: Blood from Arm, Left Updated: 03/10/21 2019    Blood culture #2 [[id number]] Collected: 03/10/21 2014    Lab Status:  In process Specimen: Blood from Arm, Left Updated: 03/10/21 2019 XR toe great min 2 views RIGHT   ED Interpretation by Khoa Amador PA-C (03/10 2045)   Bone destruction and fracture                 Procedures  Procedures         ED Course                                           MDM  Number of Diagnoses or Management Options  Cellulitis of right toe: new and requires workup  Osteomyelitis [facility]): new and requires workup  Diagnosis management comments: ddx includes but is not limited to osteo, cellulitis, abscess  Plan: XR  Sepsis workup  Dispo pending  Amount and/or Complexity of Data Reviewed  Tests in the radiology section of CPT®: ordered and reviewed  Independent visualization of images, tracings, or specimens: yes    Risk of Complications, Morbidity, and/or Mortality  Presenting problems: moderate  Management options: low  General comments: 26 yo female with right great toe pain  History, exam, workup concerning for osteo and cellulitis  Will admit for further management and podiatry consult  Pt agrees with plan  Stable at time of admit  Patient Progress  Patient progress: stable      Disposition  Final diagnoses:   Osteomyelitis ([address] )   Cellulitis of right toe     Time reflects when diagnosis was documented in both MDM as applicable and the Disposition within this note     Time User Action Codes Description Comment    3/10/2021  9:06 PM Ele Barnett Add [M86 9] Osteomyelitis ([address] )     3/10/2021  9:06 PM Zoila GAR Add [L03 031] Cellulitis of right toe       ED Disposition     ED Disposition Condition Date/Time Comment    Admit Stable Wed Mar 10, 2021  9:06 PM Case was discussed with Eloy Fiore and the patient's admission status was agreed to be Admission Status: inpatient status to the service of Dr Tristen Nguyenmsted   Follow-up Information    None         Patient's Medications   Discharge Prescriptions    No medications on file     No discharge procedures on file      PDMP Review     None          ED Provider  Electronically Signed by ChanceCameron, [place]  03/10/21 2111

## 2021-03-11 NOTE — PROGRESS NOTES
[address] [facility] Progress Note - Tadeo Bruce 1990, 27 y o  female MRN: [id number]  Unit/Bed#: -Timothy Encounter: [id number]  Primary Care Provider: Yogi Becker PA-C   Date and time admitted to hospital: 3/10/2021  7:33 PM    * Osteomyelitis [facility])  Assessment & Plan  · Noted on x-ray of the right great toe with superimposed cellulitis: Formal read pending  · Was recently discharged approximately 3 weeks ago due to diabetic foot ulcers with surrounding cellulitis at that time no osteomyelitis was found  · Continue with IV ceftriaxone  · Podiatry consult for possible surgery  · Reviewed cultures from previous admission which does show 2+ MRSA, 3+ beta hemolytic strep group G, anaerobic with 4+ mixed aerobic/anaerobic (presumptive Prevotella species)  · Consult Infectious Disease, MRSA resistant to clindamycin and patient has a vancomycin allergy  · Consider gentamicin  · Add flagyl for anaerobic coverage    Cellulitis of toe  Assessment & Plan  · Continue antibiotics as outlined above    Diabetes [facility])  Assessment & Plan  Lab Results   Component Value Date    HGBA1C 6 2 (H) 01/21/2021       Recent Labs     03/10/21  1943 03/10/21  2158 03/11/21  0734   POCGLU 165* 141* 123       Blood Sugar Average: Last 72 hrs:  (P) 143   · Hold home p o   Agents  · Sliding scale insulin, CCO diet    Seizure disorder (HCC)  Assessment & Plan  · Continue home Lamictal    Hyperlipidemia  Assessment & Plan  · Continue statin therapy    Morbid obesity due to excess calories (HCC)  Assessment & Plan  · BMI 42 19, advised on dietary and lifestyle modifications        VTE Pharmacologic Prophylaxis:   Pharmacologic: Heparin  Mechanical VTE Prophylaxis in Place: No    Patient Centered Rounds: I have evaluated patient without nursing staff present due to Spoke with nurse independent of exam    Discussions with Specialists or Other Care Team Provider: podiatry and ID pending     Education and Discussions with Family / Patient: patient     Time Spent for Care: 30 minutes  More than 50% of total time spent on counseling and coordination of care as described above  Current Length of Stay: 1 day(s)    Current Patient Status: Inpatient   Certification Statement: The patient will continue to require additional inpatient hospital stay due to osteomyelitis    Discharge Plan: pending treatment of infection    Code Status: Level 1 - Full Code      Subjective:   Patient seen this morning, she is doing okay  Denies any acute changes overnight, no fevers or chills  No chest pain or shortness of breath  Denies any new pain foot  Patient agreeable to ID evaluation today to discuss antibiotic treatment  She also understands that she may need an amputation, and wonders if there is such a thing as a toe prosthetic  Objective:     Vitals:   Temp (24hrs), Av 1 °F (37 3 °C), Min:98 4 °F (36 9 °C), Max:99 8 °F (37 7 °C)    Temp:  [98 4 °F (36 9 °C)-99 8 °F (37 7 °C)] 98 4 °F (36 9 °C)  HR:  [] 88  Resp:  [16-18] 16  BP: (110-121)/(56-70) 110/66  SpO2:  [93 %-99 %] 93 %  Body mass index is 42 19 kg/m²  Input and Output Summary (last 24 hours):     No intake or output data in the 24 hours ending 21 0853    Physical Exam:     Physical Exam  Vitals signs and nursing note reviewed  Constitutional:       General: She is not in acute distress  Appearance: Normal appearance  She is obese  She is not ill-appearing or toxic-appearing  Cardiovascular:      Rate and Rhythm: Normal rate and regular rhythm  Heart sounds: Normal heart sounds  Pulmonary:      Effort: Pulmonary effort is normal  No respiratory distress  Breath sounds: Normal breath sounds  Abdominal:      General: Bowel sounds are normal       Palpations: Abdomen is soft  Musculoskeletal:         General: Deformity (Dressing to the right lower extremity intact) present     Neurological:      Mental Status: She is alert and oriented to person, place, and time  Additional Data:     Labs:    Results from last 7 days   Lab Units 03/11/21  0525   WBC Thousand/uL 7 39   HEMOGLOBIN g/dL 11 1*   HEMATOCRIT % 36 9   PLATELETS Thousands/uL 266   NEUTROS PCT % 60   LYMPHS PCT % 25   MONOS PCT % 12   EOS PCT % 2     Results from last 7 days   Lab Units 03/11/21  0525 03/10/21  2015   SODIUM mmol/L 138 138   POTASSIUM mmol/L 3 2* 3 6   CHLORIDE mmol/L 104 101   CO2 mmol/L 22 23   BUN mg/dL 7 9   CREATININE mg/dL 0 66 0 79   ANION GAP mmol/L 12 14*   CALCIUM mg/dL 8 3 8 7   ALBUMIN g/dL  --  2 9*   TOTAL BILIRUBIN mg/dL  --  0 68   ALK PHOS U/L  --  97   ALT U/L  --  21   AST U/L  --  15   GLUCOSE RANDOM mg/dL 126 167*     Results from last 7 days   Lab Units 03/10/21  2014   INR  1 02     Results from last 7 days   Lab Units 03/11/21  0734 03/10/21  2158 03/10/21  1943   POC GLUCOSE mg/dl 123 141* 165*         Results from last 7 days   Lab Units 03/10/21  2015 03/10/21  2014   LACTIC ACID mmol/L 1 9  --    PROCALCITONIN ng/ml  --  <0 05           * I Have Reviewed All Lab Data Listed Above  * Additional Pertinent Lab Tests Reviewed: Dylan 66 Admission Reviewed    Imaging:    Imaging Reports Reviewed Today Include: XR foot: Findings concerning for osteomyelitis of the right great toe distal phalanx and an associated oblique intra-articular pathologic fracture  Imaging Personally Reviewed by Myself Includes:      Recent Cultures (last 7 days):     Results from last 7 days   Lab Units 03/10/21  2048 03/10/21  2014   BLOOD CULTURE  Received in Microbiology Lab  Culture in Progress  Received in Microbiology Lab  Culture in Progress         Last 24 Hours Medication List:   Current Facility-Administered Medications   Medication Dose Route Frequency Provider Last Rate    acetaminophen  650 mg Oral Q6H PRN Dustin Wayne MD      atorvastatin  20 mg Oral Daily With Suri Gutierrez MD      cefazolin  1,000 mg Intravenous Q8H Estrella Freeman MD 1,000 mg (03/11/21 0549)    gabapentin  300 mg Oral Daily Dustin Wayne MD      heparin (porcine)  5,000 Units Subcutaneous Q8H [address] Dustin Wayne MD      HYDROmorphone  0 5 mg Intravenous Q4H PRN Dustin Wayne MD      insulin lispro  1-5 Units Subcutaneous HS Dustin Wayne MD      insulin lispro  1-6 Units Subcutaneous TID AC Dustin Wayne MD      lamoTRIgine  100 mg Oral Daily Dustin Wayne MD      metroNIDAZOLE  500 mg Oral Q8H [address] Karen Isbell PA-C      ondansetron  4 mg Intravenous Q6H PRN Dustin Wayne MD      oxybutynin  10 mg Oral Daily Dustin Wayne MD      oxyCODONE  10 mg Oral Q4H PRN Dustin Wayne MD      oxyCODONE  5 mg Oral Q4H PRN Dustin Wayne MD      topiramate  200 mg Oral Daily Estrella Freeman MD          Today, Patient Was Seen By: Gina Sebastian PA-C    ** Please Note: Dictation voice to text software may have been used in the creation of this document   **

## 2021-03-11 NOTE — ASSESSMENT & PLAN NOTE
Lab Results   Component Value Date    HGBA1C 6 2 (H) 01/21/2021       Recent Labs     03/10/21  1943 03/10/21  2158 03/11/21  0734   POCGLU 165* 141* 123       Blood Sugar Average: Last 72 hrs:  (P) 143   · Hold home p o   Agents  · Sliding scale insulin, CCO diet

## 2021-03-12 ENCOUNTER — APPOINTMENT (INPATIENT)
Dept: MRI IMAGING | Facility: HOSPITAL | Age: 31
DRG: 617 | End: 2021-03-12
Payer: MEDICARE

## 2021-03-12 LAB
ANION GAP SERPL CALCULATED.3IONS-SCNC: 10 MMOL/L (ref 4–13)
BUN SERPL-MCNC: 7 MG/DL (ref 5–25)
CALCIUM SERPL-MCNC: 9 MG/DL (ref 8.3–10.1)
CHLORIDE SERPL-SCNC: 107 MMOL/L (ref 100–108)
CO2 SERPL-SCNC: 23 MMOL/L (ref 21–32)
CREAT SERPL-MCNC: 0.67 MG/DL (ref 0.6–1.3)
ERYTHROCYTE [DISTWIDTH] IN BLOOD BY AUTOMATED COUNT: 13.5 % (ref 11.6–15.1)
GFR SERPL CREATININE-BSD FRML MDRD: 118 ML/MIN/1.73SQ M
GLUCOSE SERPL-MCNC: 128 MG/DL (ref 65–140)
GLUCOSE SERPL-MCNC: 133 MG/DL (ref 65–140)
GLUCOSE SERPL-MCNC: 151 MG/DL (ref 65–140)
GLUCOSE SERPL-MCNC: 160 MG/DL (ref 65–140)
GLUCOSE SERPL-MCNC: 162 MG/DL (ref 65–140)
HCT VFR BLD AUTO: 35.8 % (ref 34.8–46.1)
HGB BLD-MCNC: 10.9 G/DL (ref 11.5–15.4)
MAGNESIUM SERPL-MCNC: 2.4 MG/DL (ref 1.6–2.6)
MCH RBC QN AUTO: 26.3 PG (ref 26.8–34.3)
MCHC RBC AUTO-ENTMCNC: 30.4 G/DL (ref 31.4–37.4)
MCV RBC AUTO: 87 FL (ref 82–98)
PLATELET # BLD AUTO: 261 THOUSANDS/UL (ref 149–390)
PMV BLD AUTO: 9.6 FL (ref 8.9–12.7)
POTASSIUM SERPL-SCNC: 3.7 MMOL/L (ref 3.5–5.3)
RBC # BLD AUTO: 4.14 MILLION/UL (ref 3.81–5.12)
SODIUM SERPL-SCNC: 140 MMOL/L (ref 136–145)
WBC # BLD AUTO: 5.54 THOUSAND/UL (ref 4.31–10.16)

## 2021-03-12 PROCEDURE — 82948 REAGENT STRIP/BLOOD GLUCOSE: CPT

## 2021-03-12 PROCEDURE — A9585 GADOBUTROL INJECTION: HCPCS | Performed by: PODIATRIST

## 2021-03-12 PROCEDURE — G1004 CDSM NDSC: HCPCS

## 2021-03-12 PROCEDURE — 85027 COMPLETE CBC AUTOMATED: CPT | Performed by: PHYSICIAN ASSISTANT

## 2021-03-12 PROCEDURE — 83735 ASSAY OF MAGNESIUM: CPT | Performed by: PHYSICIAN ASSISTANT

## 2021-03-12 PROCEDURE — 99233 SBSQ HOSP IP/OBS HIGH 50: CPT | Performed by: INTERNAL MEDICINE

## 2021-03-12 PROCEDURE — 73720 MRI LWR EXTREMITY W/O&W/DYE: CPT

## 2021-03-12 PROCEDURE — 99232 SBSQ HOSP IP/OBS MODERATE 35: CPT | Performed by: PHYSICIAN ASSISTANT

## 2021-03-12 PROCEDURE — 80048 BASIC METABOLIC PNL TOTAL CA: CPT | Performed by: PHYSICIAN ASSISTANT

## 2021-03-12 RX ORDER — TOPIRAMATE 100 MG/1
200 TABLET, FILM COATED ORAL
Status: DISCONTINUED | OUTPATIENT
Start: 2021-03-12 | End: 2021-03-17 | Stop reason: HOSPADM

## 2021-03-12 RX ADMIN — TOPIRAMATE 200 MG: 100 TABLET, FILM COATED ORAL at 22:13

## 2021-03-12 RX ADMIN — CEFTAROLINE FOSAMIL 600 MG: 600 POWDER, FOR SOLUTION INTRAVENOUS at 23:45

## 2021-03-12 RX ADMIN — CEFTAROLINE FOSAMIL 600 MG: 600 POWDER, FOR SOLUTION INTRAVENOUS at 00:31

## 2021-03-12 RX ADMIN — INSULIN LISPRO 1 UNITS: 100 INJECTION, SOLUTION INTRAVENOUS; SUBCUTANEOUS at 22:17

## 2021-03-12 RX ADMIN — HEPARIN SODIUM 5000 UNITS: 5000 INJECTION INTRAVENOUS; SUBCUTANEOUS at 13:42

## 2021-03-12 RX ADMIN — METRONIDAZOLE 500 MG: 500 TABLET, FILM COATED ORAL at 22:13

## 2021-03-12 RX ADMIN — CEFTAROLINE FOSAMIL 600 MG: 600 POWDER, FOR SOLUTION INTRAVENOUS at 11:33

## 2021-03-12 RX ADMIN — HEPARIN SODIUM 5000 UNITS: 5000 INJECTION INTRAVENOUS; SUBCUTANEOUS at 22:14

## 2021-03-12 RX ADMIN — ATORVASTATIN CALCIUM 20 MG: 20 TABLET, FILM COATED ORAL at 17:06

## 2021-03-12 RX ADMIN — METRONIDAZOLE 500 MG: 500 TABLET, FILM COATED ORAL at 05:37

## 2021-03-12 RX ADMIN — GABAPENTIN 300 MG: 300 CAPSULE ORAL at 09:40

## 2021-03-12 RX ADMIN — METRONIDAZOLE 500 MG: 500 TABLET, FILM COATED ORAL at 13:42

## 2021-03-12 RX ADMIN — GADOBUTROL 11 ML: 604.72 INJECTION INTRAVENOUS at 18:30

## 2021-03-12 RX ADMIN — LAMOTRIGINE 100 MG: 100 TABLET ORAL at 09:40

## 2021-03-12 RX ADMIN — HEPARIN SODIUM 5000 UNITS: 5000 INJECTION INTRAVENOUS; SUBCUTANEOUS at 05:38

## 2021-03-12 RX ADMIN — INSULIN LISPRO 1 UNITS: 100 INJECTION, SOLUTION INTRAVENOUS; SUBCUTANEOUS at 11:34

## 2021-03-12 RX ADMIN — OXYBUTYNIN 10 MG: 5 TABLET, FILM COATED, EXTENDED RELEASE ORAL at 09:41

## 2021-03-12 NOTE — CASE MANAGEMENT
Per rounding with [facility], patient is anticipated to go to the OR this weekend for toe amputation  After reviewing [facility] but need to know her wound care needs prior to accepting  Cm to follow up after patient goes to OR  Cm department will continue to follow patient through discharge

## 2021-03-12 NOTE — ASSESSMENT & PLAN NOTE
· Noted on x-ray of the right great toe with superimposed cellulitis: Formal read pending  · Was recently discharged approximately 3 weeks ago due to diabetic foot ulcers with surrounding cellulitis at that time no osteomyelitis was found  · Reviewed cultures from previous admission which does show 2+ MRSA, 3+ beta hemolytic strep group G, anaerobic with 4+ mixed aerobic/anaerobic (presumptive Prevotella species)  · Podiatry consult noted  · Infectious Disease, MRSA resistant to clindamycin and patient has a reported vancomycin allergy  · Ceftaroline for MRSA + flagyl for anaerobic coverage started 3/11

## 2021-03-12 NOTE — ASSESSMENT & PLAN NOTE
Lab Results   Component Value Date    HGBA1C 6 2 (H) 01/21/2021       Recent Labs     03/11/21  0734 03/11/21  1145 03/11/21  1648 03/11/21 2117   POCGLU 123 122 161* 172*       Blood Sugar Average: Last 72 hrs:  (P) 268 [id number]   · Hold home p o   Agents  · Sliding scale insulin, CCO diet

## 2021-03-12 NOTE — PROGRESS NOTES
[address] [facility] Progress Note - Gurpreet Leon 1990, 27 y o  female MRN: [id number]  Unit/Bed#: -Timothy Encounter: [id number]  Primary Care Provider: Tran Wisdom PA-C   Date and time admitted to hospital: 3/10/2021  7:33 PM    * Osteomyelitis [facility])  Assessment & Plan  · Noted on x-ray of the right great toe with superimposed cellulitis: Formal read pending  · Was recently discharged approximately 3 weeks ago due to diabetic foot ulcers with surrounding cellulitis at that time no osteomyelitis was found  · Reviewed cultures from previous admission which does show 2+ MRSA, 3+ beta hemolytic strep group G, anaerobic with 4+ mixed aerobic/anaerobic (presumptive Prevotella species)  · Podiatry consult noted  · Infectious Disease, MRSA resistant to clindamycin and patient has a reported vancomycin allergy  · Ceftaroline for MRSA + flagyl for anaerobic coverage started 3/11    Cellulitis of toe  Assessment & Plan  · Continue antibiotics as outlined above    Diabetes [facility])  Assessment & Plan  Lab Results   Component Value Date    HGBA1C 6 2 (H) 01/21/2021       Recent Labs     03/11/21  0734 03/11/21  1145 03/11/21  1648 03/11/21  2117   POCGLU 123 122 161* 172*       Blood Sugar Average: Last 72 hrs:  (P) 690 [id number]   · Hold home p o  Agents  · Sliding scale insulin, CCO diet    Seizure disorder (HCC)  Assessment & Plan  · Continue home Lamictal    Hyperlipidemia  Assessment & Plan  · Continue statin therapy    Morbid obesity due to excess calories (HCC)  Assessment & Plan  · BMI 42 19, advised on dietary and lifestyle modifications        VTE Pharmacologic Prophylaxis:   Pharmacologic: Heparin  Mechanical VTE Prophylaxis in Place: No    Patient Centered Rounds: I have performed bedside rounds with nursing staff today      Discussions with Specialists or Other Care Team Provider: podiatry and ID notes reviewed     Education and Discussions with Family / Patient: patient; given permission to contact her friend, Clare Garcia, by phone     Time Spent for Care: 15 minutes  More than 50% of total time spent on counseling and coordination of care as described above  Current Length of Stay: 2 day(s)    Current Patient Status: Inpatient   Certification Statement: The patient will continue to require additional inpatient hospital stay due to await definitive treatment by podiatry , pending MRI    Discharge Plan: pending MRI results    Code Status: Level 1 - Full Code      Subjective:   Patient seen this morning, sitting up in bed  Denies any acute issues overnight  No fevers or chills  Feels she is tolerating the antibiotics well  Denies diarrhea or constipation  No nausea or vomiting  Denies any new pain in the foot  Agreeable to MRI today  Patient also requests an update to her friend, Clare Garcia, by phone  Objective:     Vitals:   Temp (24hrs), Av 4 °F (36 9 °C), Min:97 9 °F (36 6 °C), Max:98 7 °F (37 1 °C)    Temp:  [97 9 °F (36 6 °C)-98 7 °F (37 1 °C)] 98 7 °F (37 1 °C)  HR:  [87-95] 95  Resp:  [14-18] 14  BP: ()/(57-67) 89/58  SpO2:  [94 %] 94 %  Body mass index is 42 19 kg/m²  Input and Output Summary (last 24 hours): Intake/Output Summary (Last 24 hours) at 3/12/2021 0756  Last data filed at 3/11/2021 1758  Gross per 24 hour   Intake 480 ml   Output 400 ml   Net 80 ml       Physical Exam:     Physical Exam  Vitals signs and nursing note reviewed  Constitutional:       Appearance: Normal appearance  She is obese  She is not ill-appearing or toxic-appearing  Cardiovascular:      Rate and Rhythm: Normal rate and regular rhythm  Heart sounds: Normal heart sounds  Pulmonary:      Effort: Pulmonary effort is normal  No respiratory distress  Breath sounds: Normal breath sounds  Abdominal:      General: Bowel sounds are normal       Palpations: Abdomen is soft  Musculoskeletal:         General: Deformity ( left lower extremity, foot) present  Neurological:      Mental Status: She is alert and oriented to person, place, and time  Psychiatric:         Mood and Affect: Affect is flat  Additional Data:     Labs:    Results from last 7 days   Lab Units 03/12/21  0656 03/11/21  0525   WBC Thousand/uL 5 54 7 39   HEMOGLOBIN g/dL 10 9* 11 1*   HEMATOCRIT % 35 8 36 9   PLATELETS Thousands/uL 261 266   NEUTROS PCT %  --  60   LYMPHS PCT %  --  25   MONOS PCT %  --  12   EOS PCT %  --  2     Results from last 7 days   Lab Units 03/12/21  0656  03/10/21  2015   SODIUM mmol/L 140   < > 138   POTASSIUM mmol/L 3 7   < > 3 6   CHLORIDE mmol/L 107   < > 101   CO2 mmol/L 23   < > 23   BUN mg/dL 7   < > 9   CREATININE mg/dL 0 67   < > 0 79   ANION GAP mmol/L 10   < > 14*   CALCIUM mg/dL 9 0   < > 8 7   ALBUMIN g/dL  --   --  2 9*   TOTAL BILIRUBIN mg/dL  --   --  0 68   ALK PHOS U/L  --   --  97   ALT U/L  --   --  21   AST U/L  --   --  15   GLUCOSE RANDOM mg/dL 160*   < > 167*    < > = values in this interval not displayed  Results from last 7 days   Lab Units 03/10/21  2014   INR  1 02     Results from last 7 days   Lab Units 03/11/21  2117 03/11/21  1648 03/11/21  1145 03/11/21  0734 03/10/21  2158 03/10/21  1943   POC GLUCOSE mg/dl 172* 161* 122 123 141* 165*         Results from last 7 days   Lab Units 03/11/21  0525 03/10/21  2015 03/10/21  2014   LACTIC ACID mmol/L  --  1 9  --    PROCALCITONIN ng/ml <0 05  --  <0 05           * I Have Reviewed All Lab Data Listed Above  * Additional Pertinent Lab Tests Reviewed: All Labs Within Last 24 Hours Reviewed      Recent Cultures (last 7 days):     Results from last 7 days   Lab Units 03/10/21  2258 03/10/21  2048 03/10/21  2014   BLOOD CULTURE   --  No Growth at 24 hrs  No Growth at 24 hrs     GRAM STAIN RESULT  No Polys or Bacteria seen  --   --        Last 24 Hours Medication List:   Current Facility-Administered Medications   Medication Dose Route Frequency Provider Last Rate    acetaminophen  650 mg Oral Q6H PRN Lupe Harp MD      atorvastatin  20 mg Oral Daily With Gildardo Armas MD      ceftaroline [facility]) IV  600 mg Intravenous Q12H Rachid Soler  mg (03/12/21 0031)    gabapentin  300 mg Oral Daily Dustin Wayne MD      heparin (porcine)  5,000 Units Subcutaneous Q8H [address] Dustin Wayne MD      HYDROmorphone  0 5 mg Intravenous Q4H PRN Dustin Wayne MD      insulin lispro  1-5 Units Subcutaneous HS Dustin Wayne MD      insulin lispro  1-6 Units Subcutaneous TID AC Dustin Wayne MD      lamoTRIgine  100 mg Oral Daily Dustin Wayne MD      metroNIDAZOLE  500 mg Oral Q8H [address] Karen Isbell PA-C      ondansetron  4 mg Intravenous Q6H PRN Dustin Wayne MD      oxybutynin  10 mg Oral Daily Dustin Wayne MD      oxyCODONE  10 mg Oral Q4H PRN Dustin Wayne MD      oxyCODONE  5 mg Oral Q4H PRN Dustin Wayne MD      topiramate  200 mg Oral Daily Lupe Harp MD          Today, Patient Was Seen By: Nelia Carcamo PA-C    ** Please Note: Dictation voice to text software may have been used in the creation of this document   **

## 2021-03-12 NOTE — PROGRESS NOTES
Progress Note - Infectious Disease   Gertrudis Campa 27 y o  female MRN: [id number]  Unit/Bed#: -01 Encounter: [id number]      Impression/Recommendations:  1  Right hallux chronic ulcer with superinfection  Also osteomyelitis distal phalanx with intra-articular pathologic fracture  Recent wound cultures with MRSA, GGS, anaerobes  Current wound culture with S  Aureus, GBS  Blood cultures negative  Clinically stable without sepsis  Rec:  ? Continue ceftaroline/Flagyl for now through weekend  ? Follow up repeat wound culture and tailor antibiotics as indicated  ? Follow up MRI per Podiatry  ? Podiatry follow-up ongoing - suspect patient will need toe amputation  ? Follow temperatures closely  ? Supportive care as per the primary service     2  DM with DPN  Recent A1c 6 2  Risk factor for above     3  Bipolar disorder  Recent hospitalization for depression  Risk factor for poor self care      I will reassess the patient on Monday 3/15  Please call in the interim with new questions      Antibiotics:  Ceftaroline #2  Flagyl #2    Subjective:  Patient seen on AM rounds  Denies fevers, chills, sweats, nausea, vomiting, or diarrhea  24 Hour Events:  No documented fevers, chills, sweats, nausea, vomiting, or diarrhea  Objective:  Vitals:  Temp:  [97 9 °F (36 6 °C)-98 7 °F (37 1 °C)] 98 7 °F (37 1 °C)  HR:  [87-95] 95  Resp:  [14-18] 14  BP: ()/(57-67) 89/58  SpO2:  [94 %] 94 %  Temp (24hrs), Av 4 °F (36 9 °C), Min:97 9 °F (36 6 °C), Max:98 7 °F (37 1 °C)  Current: Temperature: 98 7 °F (37 1 °C)    Physical Exam:   General:  No acute distress  Psychiatric:  Awake and alert  Pulmonary:  Normal respiratory excursion without accessory muscle use  Abdomen:  Soft, nontender  Extremities:  Persistent right toe erythema and swelling with purulent drainage soaking through gauze  Skin:  No rashes    Lab Results:  I have personally reviewed pertinent labs    Results from last 7 days   Lab Units 03/12/21  0656 03/11/21  0525 03/10/21  2015   POTASSIUM mmol/L 3 7 3 2* 3 6   CHLORIDE mmol/L 107 104 101   CO2 mmol/L 23 22 23   BUN mg/dL 7 7 9   CREATININE mg/dL 0 67 0 66 0 79   EGFR ml/min/1 73sq m 118 119 101   CALCIUM mg/dL 9 0 8 3 8 7   AST U/L  --   --  15   ALT U/L  --   --  21   ALK PHOS U/L  --   --  97     Results from last 7 days   Lab Units 03/12/21  0656 03/11/21  0525 03/10/21  2015   WBC Thousand/uL 5 54 7 39 10 03   HEMOGLOBIN g/dL 10 9* 11 1* 11 5   PLATELETS Thousands/uL 261 266 295     Results from last 7 days   Lab Units 03/10/21  2258 03/10/21  2048 03/10/21  2014   BLOOD CULTURE   --  No Growth at 24 hrs  No Growth at 24 hrs  GRAM STAIN RESULT  No Polys or Bacteria seen  --   --    WOUND CULTURE  1+ Growth of Staphylococcus aureus*  1+ Growth of Beta Hemolytic Streptococcus Group G*  --   --        Imaging Studies:   I have personally reviewed pertinent imaging study reports and images in PACS  EKG, Pathology, and Other Studies:   I have personally reviewed pertinent reports

## 2021-03-13 LAB
BACTERIA WND AEROBE CULT: ABNORMAL
BACTERIA WND AEROBE CULT: ABNORMAL
GLUCOSE SERPL-MCNC: 123 MG/DL (ref 65–140)
GLUCOSE SERPL-MCNC: 167 MG/DL (ref 65–140)
GLUCOSE SERPL-MCNC: 193 MG/DL (ref 65–140)
GLUCOSE SERPL-MCNC: 203 MG/DL (ref 65–140)
GRAM STN SPEC: ABNORMAL

## 2021-03-13 PROCEDURE — 99232 SBSQ HOSP IP/OBS MODERATE 35: CPT | Performed by: STUDENT IN AN ORGANIZED HEALTH CARE EDUCATION/TRAINING PROGRAM

## 2021-03-13 PROCEDURE — 82948 REAGENT STRIP/BLOOD GLUCOSE: CPT

## 2021-03-13 RX ADMIN — HEPARIN SODIUM 5000 UNITS: 5000 INJECTION INTRAVENOUS; SUBCUTANEOUS at 05:29

## 2021-03-13 RX ADMIN — OXYBUTYNIN 10 MG: 5 TABLET, FILM COATED, EXTENDED RELEASE ORAL at 08:17

## 2021-03-13 RX ADMIN — HEPARIN SODIUM 5000 UNITS: 5000 INJECTION INTRAVENOUS; SUBCUTANEOUS at 21:45

## 2021-03-13 RX ADMIN — INSULIN LISPRO 1 UNITS: 100 INJECTION, SOLUTION INTRAVENOUS; SUBCUTANEOUS at 21:45

## 2021-03-13 RX ADMIN — METRONIDAZOLE 500 MG: 500 TABLET, FILM COATED ORAL at 13:48

## 2021-03-13 RX ADMIN — METRONIDAZOLE 500 MG: 500 TABLET, FILM COATED ORAL at 05:29

## 2021-03-13 RX ADMIN — METRONIDAZOLE 500 MG: 500 TABLET, FILM COATED ORAL at 21:45

## 2021-03-13 RX ADMIN — INSULIN LISPRO 2 UNITS: 100 INJECTION, SOLUTION INTRAVENOUS; SUBCUTANEOUS at 11:32

## 2021-03-13 RX ADMIN — TOPIRAMATE 200 MG: 100 TABLET, FILM COATED ORAL at 21:45

## 2021-03-13 RX ADMIN — INSULIN LISPRO 1 UNITS: 100 INJECTION, SOLUTION INTRAVENOUS; SUBCUTANEOUS at 17:30

## 2021-03-13 RX ADMIN — ATORVASTATIN CALCIUM 20 MG: 20 TABLET, FILM COATED ORAL at 18:01

## 2021-03-13 RX ADMIN — GABAPENTIN 300 MG: 300 CAPSULE ORAL at 08:17

## 2021-03-13 RX ADMIN — LAMOTRIGINE 100 MG: 100 TABLET ORAL at 08:17

## 2021-03-13 RX ADMIN — HEPARIN SODIUM 5000 UNITS: 5000 INJECTION INTRAVENOUS; SUBCUTANEOUS at 13:47

## 2021-03-13 RX ADMIN — CEFTAROLINE FOSAMIL 600 MG: 600 POWDER, FOR SOLUTION INTRAVENOUS at 11:32

## 2021-03-13 RX ADMIN — CEFTAROLINE FOSAMIL 600 MG: 600 POWDER, FOR SOLUTION INTRAVENOUS at 23:16

## 2021-03-13 NOTE — ASSESSMENT & PLAN NOTE
· Noted on x-ray of the right great toe with superimposed cellulitis: Formal read pending  · Was recently discharged approximately 3 weeks ago due to diabetic foot ulcers with surrounding cellulitis at that time no osteomyelitis was found  · Reviewed cultures from previous admission which does show 2+ MRSA, 3+ beta hemolytic strep group G, anaerobic with 4+ mixed aerobic/anaerobic (presumptive Prevotella species)  · Current 1 culture noted with MRSA, beta-hemolytic strep group G    · MRSA resistant to clindamycin and patient has a reported vancomycin allergy  · Continue Ceftaroline for MRSA + flagyl for anaerobic coverage started 3/11 per ID recommendation  · Pending MRI report  · ID following, podiatry following

## 2021-03-13 NOTE — PLAN OF CARE
Problem: PAIN - ADULT  Goal: Verbalizes/displays adequate comfort level or baseline comfort level  Description: Interventions:  - Encourage patient to monitor pain and request assistance  - Assess pain using appropriate pain scale  - Administer analgesics based on type and severity of pain and evaluate response  - Implement non-pharmacological measures as appropriate and evaluate response  - Consider cultural and social influences on pain and pain management  - Notify physician/advanced practitioner if interventions unsuccessful or patient reports new pain  Outcome: Progressing     Problem: INFECTION - ADULT  Goal: Absence or prevention of progression during hospitalization  Description: INTERVENTIONS:  - Assess and monitor for signs and symptoms of infection  - Monitor lab/diagnostic results  - Monitor all insertion sites, i e  indwelling lines, tubes, and drains  - Monitor endotracheal if appropriate and nasal secretions for changes in amount and color  - [place] appropriate cooling/warming therapies per order  - Administer medications as ordered  - Instruct and encourage patient and family to use good hand hygiene technique  - Identify and instruct in appropriate isolation precautions for identified infection/condition  Outcome: Progressing  Goal: Absence of fever/infection during neutropenic period  Description: INTERVENTIONS:  - Monitor WBC    Outcome: Progressing     Problem: SAFETY ADULT  Goal: Patient will remain free of falls  Description: INTERVENTIONS:  - Assess patient frequently for physical needs  -  Identify cognitive and physical deficits and behaviors that affect risk of falls    -  [place] fall precautions as indicated by assessment   - Educate patient/family on patient safety including physical limitations  - Instruct patient to call for assistance with activity based on assessment  - Modify environment to reduce risk of injury  - Consider OT/PT consult to assist with strengthening/mobility  Outcome: Progressing  Goal: Maintain or return to baseline ADL function  Description: INTERVENTIONS:  -  Assess patient's ability to carry out ADLs; assess patient's baseline for ADL function and identify physical deficits which impact ability to perform ADLs (bathing, care of mouth/teeth, toileting, grooming, dressing, etc )  - Assess/evaluate cause of self-care deficits   - Assess range of motion  - Assess patient's mobility; develop plan if impaired  - Assess patient's need for assistive devices and provide as appropriate  - Encourage maximum independence but intervene and supervise when necessary  - Involve family in performance of ADLs  - Assess for home care needs following discharge   - Consider OT consult to assist with ADL evaluation and planning for discharge  - Provide patient education as appropriate  Outcome: Progressing  Goal: Maintain or return mobility status to optimal level  Description: INTERVENTIONS:  - Assess patient's baseline mobility status (ambulation, transfers, stairs, etc )    - Identify cognitive and physical deficits and behaviors that affect mobility  - Identify mobility aids required to assist with transfers and/or ambulation (gait belt, sit-to-stand, lift, walker, cane, etc )  - [place] fall precautions as indicated by assessment  - Record patient progress and toleration of activity level on Mobility SBAR; progress patient to next Phase/Stage  - Instruct patient to call for assistance with activity based on assessment  - Consider rehabilitation consult to assist with strengthening/weightbearing, etc   Outcome: Progressing     Problem: DISCHARGE PLANNING  Goal: Discharge to home or other facility with appropriate resources  Description: INTERVENTIONS:  - Identify barriers to discharge w/patient and caregiver  - Arrange for needed discharge resources and transportation as appropriate  - Identify discharge learning needs (meds, wound care, etc )  - Arrange for interpretive services to assist at discharge as needed  - Refer to Case Management Department for coordinating discharge planning if the patient needs post-hospital services based on physician/advanced practitioner order or complex needs related to functional status, cognitive ability, or social support system  Outcome: Progressing     Problem: Knowledge Deficit  Goal: Patient/family/caregiver demonstrates understanding of disease process, treatment plan, medications, and discharge instructions  Description: Complete learning assessment and assess knowledge base  Interventions:  - Provide teaching at level of understanding  - Provide teaching via preferred learning methods  Outcome: Progressing     Problem: Potential for Falls  Goal: Patient will remain free of falls  Description: INTERVENTIONS:  - Assess patient frequently for physical needs  -  Identify cognitive and physical deficits and behaviors that affect risk of falls  -  [place] fall precautions as indicated by assessment   - Educate patient/family on patient safety including physical limitations  - Instruct patient to call for assistance with activity based on assessment  - Modify environment to reduce risk of injury  - Consider OT/PT consult to assist with strengthening/mobility  Outcome: Progressing     Problem: Nutrition/Hydration-ADULT  Goal: Nutrient/Hydration intake appropriate for improving, restoring or maintaining nutritional needs  Description: Monitor and assess patient's nutrition/hydration status for malnutrition  Collaborate with interdisciplinary team and initiate plan and interventions as ordered  Monitor patient's weight and dietary intake as ordered or per policy  Utilize nutrition screening tool and intervene as necessary  Determine patient's food preferences and provide high-protein, high-caloric foods as appropriate       INTERVENTIONS:  - Monitor oral intake, urinary output, labs, and treatment plans  - Assess nutrition and hydration status and recommend course of action  - Evaluate amount of meals eaten  - Assist patient with eating if necessary   - Allow adequate time for meals  - Recommend/ encourage appropriate diets, oral nutritional supplements, and vitamin/mineral supplements  - Order, calculate, and assess calorie counts as needed  - Recommend, monitor, and adjust tube feedings and TPN/PPN based on assessed needs  - Assess need for intravenous fluids  - Provide specific nutrition/hydration education as appropriate  - Include patient/family/caregiver in decisions related to nutrition  Outcome: Progressing

## 2021-03-13 NOTE — ASSESSMENT & PLAN NOTE
Lab Results   Component Value Date    HGBA1C 6 2 (H) 01/21/2021       Recent Labs     03/12/21  1707 03/12/21  2125 03/13/21  0757 03/13/21  1129   POCGLU 128 162* 123 203*       Blood Sugar Average: Last 72 hrs:  (P) 534 [id number]   · Hold home p o   Agents  · Sliding scale insulin, CCO diet

## 2021-03-13 NOTE — PROGRESS NOTES
[facility]  Progress Note - Katherine Mark 1990, 27 y o  female MRN: [id number]  Unit/Bed#: -Timothy Encounter: [id number]  Primary Care Provider: Sara Benjamin PA-C   Date and time admitted to hospital: 3/10/2021  7:33 PM    * Osteomyelitis [facility])  Assessment & Plan  · Noted on x-ray of the right great toe with superimposed cellulitis: Formal read pending  · Was recently discharged approximately 3 weeks ago due to diabetic foot ulcers with surrounding cellulitis at that time no osteomyelitis was found  · Reviewed cultures from previous admission which does show 2+ MRSA, 3+ beta hemolytic strep group G, anaerobic with 4+ mixed aerobic/anaerobic (presumptive Prevotella species)  · Current 1 culture noted with MRSA, beta-hemolytic strep group G    · MRSA resistant to clindamycin and patient has a reported vancomycin allergy  · Continue Ceftaroline for MRSA + flagyl for anaerobic coverage started 3/11 per ID recommendation  · Pending MRI report  · ID following, podiatry following  Morbid obesity due to excess calories (HCC)  Assessment & Plan  · BMI 42 19, advised on dietary and lifestyle modifications    Cellulitis of toe  Assessment & Plan  · Continue antibiotics as outlined above    Seizure disorder ([address] )  Assessment & Plan  · Continue home Lamictal    Diabetes [facility])  Assessment & Plan  Lab Results   Component Value Date    HGBA1C 6 2 (H) 01/21/2021       Recent Labs     03/12/21  1707 03/12/21  2125 03/13/21  0757 03/13/21  1129   POCGLU 128 162* 123 203*       Blood Sugar Average: Last 72 hrs:  (P) 696 [id number]   · Hold home p o  Agents  · Sliding scale insulin, CCO diet    Hyperlipidemia  Assessment & Plan  · Continue statin therapy      VTE Pharmacologic Prophylaxis:   Pharmacologic: Heparin    Patient Centered Rounds: I have performed bedside rounds with nursing staff today  Education and Discussions with Family / Patient:  Spoke with Santino Shannon at bedside  Offered to call any other family member but patient declined  Time Spent for Care: 30 minutes  More than 50% of total time spent on counseling and coordination of care as described above  Current Length of Stay: 3 day(s)    Current Patient Status: Inpatient   Certification Statement: The patient will continue to require additional inpatient hospital stay due to Above    Discharge Plan: tbd    Code Status: Level 1 - Full Code      Subjective:   Afebrile, hemodynamically stable  Tolerating antibiotics well  Denies fever, chills, nausea, vomiting, abdominal pain, diarrhea, any new complaints  No other events reported  Objective:     Vitals:   Temp (24hrs), Av 1 °F (36 7 °C), Min:97 9 °F (36 6 °C), Max:98 4 °F (36 9 °C)    Temp:  [97 9 °F (36 6 °C)-98 4 °F (36 9 °C)] 97 9 °F (36 6 °C)  HR:  [76-86] 86  Resp:  [17-18] 18  BP: (104-115)/(60-70) 115/70  SpO2:  [92 %-94 %] 94 %  Body mass index is 42 19 kg/m²  Input and Output Summary (last 24 hours): Intake/Output Summary (Last 24 hours) at 3/13/2021 1548  Last data filed at 3/13/2021 1300  Gross per 24 hour   Intake 600 ml   Output --   Net 600 ml       Physical Exam:     Physical Exam  Constitutional:       General: She is not in acute distress  Appearance: Normal appearance  She is not ill-appearing  HENT:      Head: Normocephalic and atraumatic  Nose: No rhinorrhea  Mouth/Throat:      Pharynx: No oropharyngeal exudate or posterior oropharyngeal erythema  Eyes:      Extraocular Movements: Extraocular movements intact  Conjunctiva/sclera: Conjunctivae normal       Pupils: Pupils are equal, round, and reactive to light  Neck:      Musculoskeletal: Normal range of motion and neck supple  No neck rigidity  Cardiovascular:      Rate and Rhythm: Normal rate and regular rhythm  Pulses: Normal pulses  Heart sounds: Normal heart sounds  Pulmonary:      Effort: Pulmonary effort is normal  No respiratory distress  Breath sounds: Normal breath sounds  No stridor  No wheezing or rhonchi  Abdominal:      General: Bowel sounds are normal  There is no distension  Palpations: Abdomen is soft  Tenderness: There is no abdominal tenderness  Musculoskeletal: Normal range of motion  General: No swelling  Skin:     Findings: No rash  Neurological:      General: No focal deficit present  Mental Status: She is alert and oriented to person, place, and time  Mental status is at baseline  Psychiatric:         Mood and Affect: Mood normal          Behavior: Behavior normal          Additional Data:     Labs:    Results from last 7 days   Lab Units 03/12/21  0656 03/11/21  0525   WBC Thousand/uL 5 54 7 39   HEMOGLOBIN g/dL 10 9* 11 1*   HEMATOCRIT % 35 8 36 9   PLATELETS Thousands/uL 261 266   NEUTROS PCT %  --  60   LYMPHS PCT %  --  25   MONOS PCT %  --  12   EOS PCT %  --  2     Results from last 7 days   Lab Units 03/12/21  0656  03/10/21  2015   SODIUM mmol/L 140   < > 138   POTASSIUM mmol/L 3 7   < > 3 6   CHLORIDE mmol/L 107   < > 101   CO2 mmol/L 23   < > 23   BUN mg/dL 7   < > 9   CREATININE mg/dL 0 67   < > 0 79   ANION GAP mmol/L 10   < > 14*   CALCIUM mg/dL 9 0   < > 8 7   ALBUMIN g/dL  --   --  2 9*   TOTAL BILIRUBIN mg/dL  --   --  0 68   ALK PHOS U/L  --   --  97   ALT U/L  --   --  21   AST U/L  --   --  15   GLUCOSE RANDOM mg/dL 160*   < > 167*    < > = values in this interval not displayed       Results from last 7 days   Lab Units 03/10/21  2014   INR  1 02     Results from last 7 days   Lab Units 03/13/21  1129 03/13/21  0757 03/12/21  2125 03/12/21  1707 03/12/21  1100 03/12/21  0740 03/11/21  2117 03/11/21  1648 03/11/21  1145 03/11/21  0734 03/10/21  2158 03/10/21  1943   POC GLUCOSE mg/dl 203* 123 162* 128 151* 133 172* 161* 122 123 141* 165*         Results from last 7 days   Lab Units 03/11/21  0525 03/10/21  2015 03/10/21  2014   LACTIC ACID mmol/L  --  1 9  --    PROCALCITONIN ng/ml <0 05  --  <0 05           * I Have Reviewed All Lab Data Listed Above  * Additional Pertinent Lab Tests Reviewed: All Labs Within Last 24 Hours Reviewed      Recent Cultures (last 7 days):     Results from last 7 days   Lab Units 03/10/21  2258 03/10/21  2048 03/10/21  2014   BLOOD CULTURE   --  No Growth at 48 hrs  No Growth at 48 hrs  GRAM STAIN RESULT  No Polys or Bacteria seen  --   --    WOUND CULTURE  1+ Growth of Methicillin Resistant Staphylococcus aureus*  1+ Growth of Beta Hemolytic Streptococcus Group G*  --   --        Last 24 Hours Medication List:   Current Facility-Administered Medications   Medication Dose Route Frequency Provider Last Rate    acetaminophen  650 mg Oral Q6H PRN Dustin Wayne MD      atorvastatin  20 mg Oral Daily With Rustam Chavarria MD      ceftaroline (TEFLARO) IV  600 mg Intravenous Q12H Cynthia Tucker  mg (03/13/21 1132)    gabapentin  300 mg Oral Daily Dustin Wayne MD      heparin (porcine)  5,000 Units Subcutaneous Q8H [address] Dustin Wayne MD      HYDROmorphone  0 5 mg Intravenous Q4H PRN Dustin Wayne MD      insulin lispro  1-5 Units Subcutaneous HS Dustin Wayne MD      insulin lispro  1-6 Units Subcutaneous TID AC Dustin Wayne MD      lamoTRIgine  100 mg Oral Daily Dustin Wayne MD      metroNIDAZOLE  500 mg Oral Q8H [address] Karen Isbell PA-C      ondansetron  4 mg Intravenous Q6H PRN Dustin Wayne MD      oxybutynin  10 mg Oral Daily Dustin Wayne MD      oxyCODONE  10 mg Oral Q4H PRN Dustin Wayne MD      oxyCODONE  5 mg Oral Q4H PRN Dustin Wayne MD      topiramate  200 mg Oral HS Leonarda Owen PA-C          Today, Patient Was Seen By: Cheryle Lima, MD    ** Please Note: Dictation voice to text software may have been used in the creation of this document   **

## 2021-03-13 NOTE — PROGRESS NOTES
Progress Note - Asim Swift 27 y o  female MRN: [id number]    Unit/Bed#: -01 Encounter: [id number]      Assessment:  1  Cellulitis right foot  2   Non-pressure ulcer bilateral foot to fat  3   DM with DN      Plan:  1) Await Read from MRI   2) Dressed with DSD  3) Will await MRI read but possible surgical planning this week   4) IV antibiotics as per Medicine and ID  5) Will follow     Subjective:   Patient seen bedside resting comfortably  NAD  Objective:     Vitals: Blood pressure 104/60, pulse 76, temperature 98 4 °F (36 9 °C), resp  rate 17, height 5' 5" (1 651 m), weight 115 kg (253 lb 8 5 oz), SpO2 92 %  ,Body mass index is 42 19 kg/m²  Intake/Output Summary (Last 24 hours) at 3/13/2021 1132  Last data filed at 3/12/2021 1730  Gross per 24 hour   Intake 480 ml   Output 100 ml   Net 380 ml       Physical Exam   GEN:  Awake and alert  Vasc:  DP and PT pulses palpable  Feet are warm  No leg edema bilateral      SKIN:  Ulcer plantar great toe bilateral to fat level    Gross infection right foot ulcer with severe redness and edema of the great toe      NEURO:  Decreased sensation of the toes bilateral   Motor function intact bilateral,      ORTHO:  Hallux limitus bilateral     Invasive Devices     Peripheral Intravenous Line            Peripheral IV 03/10/21 Left Arm 2 days    Peripheral IV 03/10/21 Right Arm 2 days

## 2021-03-14 LAB
ANION GAP SERPL CALCULATED.3IONS-SCNC: 10 MMOL/L (ref 4–13)
BUN SERPL-MCNC: 8 MG/DL (ref 5–25)
CALCIUM SERPL-MCNC: 8.9 MG/DL (ref 8.3–10.1)
CHLORIDE SERPL-SCNC: 107 MMOL/L (ref 100–108)
CO2 SERPL-SCNC: 22 MMOL/L (ref 21–32)
CREAT SERPL-MCNC: 0.68 MG/DL (ref 0.6–1.3)
ERYTHROCYTE [DISTWIDTH] IN BLOOD BY AUTOMATED COUNT: 13.4 % (ref 11.6–15.1)
GFR SERPL CREATININE-BSD FRML MDRD: 118 ML/MIN/1.73SQ M
GLUCOSE SERPL-MCNC: 145 MG/DL (ref 65–140)
GLUCOSE SERPL-MCNC: 159 MG/DL (ref 65–140)
GLUCOSE SERPL-MCNC: 170 MG/DL (ref 65–140)
GLUCOSE SERPL-MCNC: 203 MG/DL (ref 65–140)
GLUCOSE SERPL-MCNC: 207 MG/DL (ref 65–140)
HCT VFR BLD AUTO: 37.5 % (ref 34.8–46.1)
HGB BLD-MCNC: 11.3 G/DL (ref 11.5–15.4)
MCH RBC QN AUTO: 26.2 PG (ref 26.8–34.3)
MCHC RBC AUTO-ENTMCNC: 30.1 G/DL (ref 31.4–37.4)
MCV RBC AUTO: 87 FL (ref 82–98)
PLATELET # BLD AUTO: 290 THOUSANDS/UL (ref 149–390)
PMV BLD AUTO: 9.8 FL (ref 8.9–12.7)
POTASSIUM SERPL-SCNC: 3.7 MMOL/L (ref 3.5–5.3)
RBC # BLD AUTO: 4.31 MILLION/UL (ref 3.81–5.12)
SODIUM SERPL-SCNC: 139 MMOL/L (ref 136–145)
WBC # BLD AUTO: 6.57 THOUSAND/UL (ref 4.31–10.16)

## 2021-03-14 PROCEDURE — 85027 COMPLETE CBC AUTOMATED: CPT | Performed by: STUDENT IN AN ORGANIZED HEALTH CARE EDUCATION/TRAINING PROGRAM

## 2021-03-14 PROCEDURE — 82948 REAGENT STRIP/BLOOD GLUCOSE: CPT

## 2021-03-14 PROCEDURE — 99232 SBSQ HOSP IP/OBS MODERATE 35: CPT | Performed by: STUDENT IN AN ORGANIZED HEALTH CARE EDUCATION/TRAINING PROGRAM

## 2021-03-14 PROCEDURE — 80048 BASIC METABOLIC PNL TOTAL CA: CPT | Performed by: STUDENT IN AN ORGANIZED HEALTH CARE EDUCATION/TRAINING PROGRAM

## 2021-03-14 RX ADMIN — HEPARIN SODIUM 5000 UNITS: 5000 INJECTION INTRAVENOUS; SUBCUTANEOUS at 21:15

## 2021-03-14 RX ADMIN — METRONIDAZOLE 500 MG: 500 TABLET, FILM COATED ORAL at 05:57

## 2021-03-14 RX ADMIN — INSULIN LISPRO 2 UNITS: 100 INJECTION, SOLUTION INTRAVENOUS; SUBCUTANEOUS at 16:40

## 2021-03-14 RX ADMIN — INSULIN LISPRO 2 UNITS: 100 INJECTION, SOLUTION INTRAVENOUS; SUBCUTANEOUS at 11:22

## 2021-03-14 RX ADMIN — ATORVASTATIN CALCIUM 20 MG: 20 TABLET, FILM COATED ORAL at 16:39

## 2021-03-14 RX ADMIN — CEFTAROLINE FOSAMIL 600 MG: 600 POWDER, FOR SOLUTION INTRAVENOUS at 22:51

## 2021-03-14 RX ADMIN — METRONIDAZOLE 500 MG: 500 TABLET, FILM COATED ORAL at 16:38

## 2021-03-14 RX ADMIN — METRONIDAZOLE 500 MG: 500 TABLET, FILM COATED ORAL at 21:16

## 2021-03-14 RX ADMIN — HEPARIN SODIUM 5000 UNITS: 5000 INJECTION INTRAVENOUS; SUBCUTANEOUS at 14:00

## 2021-03-14 RX ADMIN — LAMOTRIGINE 100 MG: 100 TABLET ORAL at 08:12

## 2021-03-14 RX ADMIN — CEFTAROLINE FOSAMIL 600 MG: 600 POWDER, FOR SOLUTION INTRAVENOUS at 11:22

## 2021-03-14 RX ADMIN — GABAPENTIN 300 MG: 300 CAPSULE ORAL at 08:12

## 2021-03-14 RX ADMIN — INSULIN LISPRO 1 UNITS: 100 INJECTION, SOLUTION INTRAVENOUS; SUBCUTANEOUS at 21:15

## 2021-03-14 RX ADMIN — OXYBUTYNIN 10 MG: 5 TABLET, FILM COATED, EXTENDED RELEASE ORAL at 08:12

## 2021-03-14 RX ADMIN — HEPARIN SODIUM 5000 UNITS: 5000 INJECTION INTRAVENOUS; SUBCUTANEOUS at 05:57

## 2021-03-14 RX ADMIN — TOPIRAMATE 200 MG: 100 TABLET, FILM COATED ORAL at 21:16

## 2021-03-14 NOTE — ASSESSMENT & PLAN NOTE
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 with corresponding proximal/distal phalangeal osteomyelitis  Complete tear of the flexor pollicis longus tendon with retraction to the proximal 1st metatarsal    · Reviewed cultures from previous admission which does show 2+ MRSA, 3+ beta hemolytic strep group G, anaerobic with 4+ mixed aerobic/anaerobic (presumptive Prevotella species)  · Current 1 culture noted with MRSA, beta-hemolytic strep group G    · MRSA resistant to clindamycin and patient has a reported vancomycin allergy  · Continue Ceftaroline for MRSA + flagyl for anaerobic coverage started 3/11 per ID recommendation  · ID following, podiatry following

## 2021-03-14 NOTE — ASSESSMENT & PLAN NOTE
Lab Results   Component Value Date    HGBA1C 6 2 (H) 01/21/2021       Recent Labs     03/13/21  1550 03/13/21  2122 03/14/21  0758 03/14/21  1101   POCGLU 167* 193* 145* 207*       Blood Sugar Average: Last 72 hrs:  (P) 156 [id number]   · Hold home p o   Agents  · Sliding scale insulin, CCO diet

## 2021-03-14 NOTE — PROGRESS NOTES
[facility]  Progress Note - Loli Sosa 1990, 27 y o  female MRN: [id number]  Unit/Bed#: -Timothy Encounter: [id number]  Primary Care Provider: Temitope Herrera PA-C   Date and time admitted to hospital: 3/10/2021  7:33 PM    * Osteomyelitis [facility])  Assessment & Plan  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 with corresponding proximal/distal phalangeal osteomyelitis  Complete tear of the flexor pollicis longus tendon with retraction to the proximal 1st metatarsal    · Reviewed cultures from previous admission which does show 2+ MRSA, 3+ beta hemolytic strep group G, anaerobic with 4+ mixed aerobic/anaerobic (presumptive Prevotella species)  · Current 1 culture noted with MRSA, beta-hemolytic strep group G    · MRSA resistant to clindamycin and patient has a reported vancomycin allergy  · Continue Ceftaroline for MRSA + flagyl for anaerobic coverage started 3/11 per ID recommendation  · ID following, podiatry following  Morbid obesity due to excess calories (HCC)  Assessment & Plan  · BMI 42 19, advised on dietary and lifestyle modifications    Cellulitis of toe  Assessment & Plan  · Continue antibiotics as outlined above    Seizure disorder ([address] )  Assessment & Plan  · Continue home Lamictal    Diabetes [facility])  Assessment & Plan  Lab Results   Component Value Date    HGBA1C 6 2 (H) 01/21/2021       Recent Labs     03/13/21  1550 03/13/21  2122 03/14/21  0758 03/14/21  1101   POCGLU 167* 193* 145* 207*       Blood Sugar Average: Last 72 hrs:  (P) 156 [id number]   · Hold home p o  Agents  · Sliding scale insulin, CCO diet    Hyperlipidemia  Assessment & Plan  · Continue statin therapy      VTE Pharmacologic Prophylaxis:   Pharmacologic: Heparin    Patient Centered Rounds: I have performed bedside rounds with nursing staff today      Time Spent for Care: 30 minutes  More than 50% of total time spent on counseling and coordination of care as described above  Current Length of Stay: 4 day(s)    Current Patient Status: Inpatient   Certification Statement: The patient will continue to require additional inpatient hospital stay due to Above    Discharge Plan: tbd    Code Status: Level 1 - Full Code      Subjective:   Afebrile, hemodynamically stable  Tolerating IV antibiotics well  Currently comfortable at her baseline  Reports toe pain is controlled  Denies chest pain, dyspnea, fever, chills, nausea, vomiting, any new complaints  No other events reported  Objective:     Vitals:   Temp (24hrs), Av 1 °F (36 7 °C), Min:97 9 °F (36 6 °C), Max:98 3 °F (36 8 °C)    Temp:  [97 9 °F (36 6 °C)-98 3 °F (36 8 °C)] 98 3 °F (36 8 °C)  HR:  [71-86] 73  Resp:  [17-18] 17  BP: (107-115)/(61-70) 107/61  SpO2:  [92 %-94 %] 92 %  Body mass index is 42 19 kg/m²  Input and Output Summary (last 24 hours): Intake/Output Summary (Last 24 hours) at 3/14/2021 1332  Last data filed at 3/13/2021 1805  Gross per 24 hour   Intake 340 ml   Output --   Net 340 ml       Physical Exam:     Physical Exam  Constitutional:       General: She is not in acute distress  Appearance: Normal appearance  She is not ill-appearing  HENT:      Head: Normocephalic and atraumatic  Nose: No rhinorrhea  Mouth/Throat:      Pharynx: No oropharyngeal exudate or posterior oropharyngeal erythema  Eyes:      Extraocular Movements: Extraocular movements intact  Conjunctiva/sclera: Conjunctivae normal       Pupils: Pupils are equal, round, and reactive to light  Neck:      Musculoskeletal: Normal range of motion and neck supple  No neck rigidity  Cardiovascular:      Rate and Rhythm: Normal rate and regular rhythm  Pulses: Normal pulses  Heart sounds: Normal heart sounds  Pulmonary:      Effort: Pulmonary effort is normal  No respiratory distress        Breath sounds: Normal breath sounds  No stridor  No wheezing or rhonchi  Abdominal:      General: Bowel sounds are normal  There is no distension  Palpations: Abdomen is soft  Tenderness: There is no abdominal tenderness  Musculoskeletal:      Comments: Bilateral lower extremity noted with clean, dry, intact dressing  Neurological:      General: No focal deficit present  Mental Status: She is alert and oriented to person, place, and time  Mental status is at baseline  Psychiatric:         Mood and Affect: Mood normal          Behavior: Behavior normal            Additional Data:     Labs:    Results from last 7 days   Lab Units 03/14/21  0519 03/11/21  0525   WBC Thousand/uL 6 57   < > 7 39   HEMOGLOBIN g/dL 11 3*   < > 11 1*   HEMATOCRIT % 37 5   < > 36 9   PLATELETS Thousands/uL 290   < > 266   NEUTROS PCT %  --   --  60   LYMPHS PCT %  --   --  25   MONOS PCT %  --   --  12   EOS PCT %  --   --  2    < > = values in this interval not displayed  Results from last 7 days   Lab Units 03/14/21  0519  03/10/21  2015   SODIUM mmol/L 139   < > 138   POTASSIUM mmol/L 3 7   < > 3 6   CHLORIDE mmol/L 107   < > 101   CO2 mmol/L 22   < > 23   BUN mg/dL 8   < > 9   CREATININE mg/dL 0 68   < > 0 79   ANION GAP mmol/L 10   < > 14*   CALCIUM mg/dL 8 9   < > 8 7   ALBUMIN g/dL  --   --  2 9*   TOTAL BILIRUBIN mg/dL  --   --  0 68   ALK PHOS U/L  --   --  97   ALT U/L  --   --  21   AST U/L  --   --  15   GLUCOSE RANDOM mg/dL 170*   < > 167*    < > = values in this interval not displayed       Results from last 7 days   Lab Units 03/10/21  2014   INR  1 02     Results from last 7 days   Lab Units 03/14/21  1101 03/14/21  0758 03/13/21  2122 03/13/21  1550 03/13/21  1129 03/13/21  0757 03/12/21  2125 03/12/21  1707 03/12/21  1100 03/12/21  0740 03/11/21  2117 03/11/21  1648   POC GLUCOSE mg/dl 207* 145* 193* 167* 203* 123 162* 128 151* 133 172* 161*         Results from last 7 days   Lab Units 03/11/21  0525 03/10/21  2015 03/10/21  2014   LACTIC ACID mmol/L  --  1 9  --    PROCALCITONIN ng/ml <0 05  --  <0 05           * I Have Reviewed All Lab Data Listed Above  * Additional Pertinent Lab Tests Reviewed: All Labs Within Last 24 Hours Reviewed        Recent Cultures (last 7 days):     Results from last 7 days   Lab Units 03/10/21  2258 03/10/21  2048 03/10/21  2014   BLOOD CULTURE   --  No Growth at 72 hrs  No Growth at 72 hrs  GRAM STAIN RESULT  No Polys or Bacteria seen  --   --    WOUND CULTURE  1+ Growth of Methicillin Resistant Staphylococcus aureus*  1+ Growth of Beta Hemolytic Streptococcus Group G*  --   --        Last 24 Hours Medication List:   Current Facility-Administered Medications   Medication Dose Route Frequency Provider Last Rate    acetaminophen  650 mg Oral Q6H PRN Dustin Wayne MD      atorvastatin  20 mg Oral Daily With Shala Ham MD      ceftaroline (TEFLARO) IV  600 mg Intravenous Q12H Iona Calvert  mg (03/14/21 1122)    gabapentin  300 mg Oral Daily Dustin Wayne MD      heparin (porcine)  5,000 Units Subcutaneous Q8H [facility] Dustin Wayne MD      HYDROmorphone  0 5 mg Intravenous Q4H PRN Dustin Wayne MD      insulin lispro  1-5 Units Subcutaneous HS Dustin Wayne MD      insulin lispro  1-6 Units Subcutaneous TID AC Dustin Wayne MD      lamoTRIgine  100 mg Oral Daily Dustin Wayne MD      metroNIDAZOLE  500 mg Oral Q8H [facility] Karen Isbell PA-C      ondansetron  4 mg Intravenous Q6H PRN Dustin Wayne MD      oxybutynin  10 mg Oral Daily Dustin Wayne MD      oxyCODONE  10 mg Oral Q4H PRN Dustin Wayne MD      oxyCODONE  5 mg Oral Q4H PRN Dustin Wayne MD      topiramate  200 mg Oral HS Kory Matthews PA-C          Today, Patient Was Seen By: Thiago Mosquera MD    ** Please Note: Dictation voice to text software may have been used in the creation of this document   **

## 2021-03-15 LAB
ANION GAP SERPL CALCULATED.3IONS-SCNC: 11 MMOL/L (ref 4–13)
BASOPHILS # BLD MANUAL: 0 THOUSAND/UL (ref 0–0.1)
BASOPHILS NFR MAR MANUAL: 0 % (ref 0–1)
BUN SERPL-MCNC: 9 MG/DL (ref 5–25)
CALCIUM SERPL-MCNC: 9 MG/DL (ref 8.3–10.1)
CHLORIDE SERPL-SCNC: 105 MMOL/L (ref 100–108)
CO2 SERPL-SCNC: 22 MMOL/L (ref 21–32)
CREAT SERPL-MCNC: 0.75 MG/DL (ref 0.6–1.3)
EOSINOPHIL # BLD MANUAL: 0.15 THOUSAND/UL (ref 0–0.4)
EOSINOPHIL NFR BLD MANUAL: 2 % (ref 0–6)
ERYTHROCYTE [DISTWIDTH] IN BLOOD BY AUTOMATED COUNT: 13.6 % (ref 11.6–15.1)
GFR SERPL CREATININE-BSD FRML MDRD: 107 ML/MIN/1.73SQ M
GLUCOSE SERPL-MCNC: 155 MG/DL (ref 65–140)
GLUCOSE SERPL-MCNC: 161 MG/DL (ref 65–140)
GLUCOSE SERPL-MCNC: 166 MG/DL (ref 65–140)
GLUCOSE SERPL-MCNC: 172 MG/DL (ref 65–140)
GLUCOSE SERPL-MCNC: 191 MG/DL (ref 65–140)
HCT VFR BLD AUTO: 38.1 % (ref 34.8–46.1)
HGB BLD-MCNC: 11.6 G/DL (ref 11.5–15.4)
LYMPHOCYTES # BLD AUTO: 2.18 THOUSAND/UL (ref 0.6–4.47)
LYMPHOCYTES # BLD AUTO: 29 % (ref 14–44)
MCH RBC QN AUTO: 26.2 PG (ref 26.8–34.3)
MCHC RBC AUTO-ENTMCNC: 30.4 G/DL (ref 31.4–37.4)
MCV RBC AUTO: 86 FL (ref 82–98)
METAMYELOCYTES NFR BLD MANUAL: 1 % (ref 0–1)
MONOCYTES # BLD AUTO: 0.68 THOUSAND/UL (ref 0–1.22)
MONOCYTES NFR BLD: 9 % (ref 4–12)
NEUTROPHILS # BLD MANUAL: 4.43 THOUSAND/UL (ref 1.85–7.62)
NEUTS BAND NFR BLD MANUAL: 1 % (ref 0–8)
NEUTS SEG NFR BLD AUTO: 58 % (ref 43–75)
NRBC BLD AUTO-RTO: 0 /100 WBCS
PLATELET # BLD AUTO: 295 THOUSANDS/UL (ref 149–390)
PLATELET BLD QL SMEAR: ADEQUATE
PMV BLD AUTO: 9.3 FL (ref 8.9–12.7)
POTASSIUM SERPL-SCNC: 3.6 MMOL/L (ref 3.5–5.3)
RBC # BLD AUTO: 4.42 MILLION/UL (ref 3.81–5.12)
SODIUM SERPL-SCNC: 138 MMOL/L (ref 136–145)
TOTAL CELLS COUNTED SPEC: 100
WBC # BLD AUTO: 7.5 THOUSAND/UL (ref 4.31–10.16)

## 2021-03-15 PROCEDURE — 82948 REAGENT STRIP/BLOOD GLUCOSE: CPT

## 2021-03-15 PROCEDURE — 99232 SBSQ HOSP IP/OBS MODERATE 35: CPT | Performed by: STUDENT IN AN ORGANIZED HEALTH CARE EDUCATION/TRAINING PROGRAM

## 2021-03-15 PROCEDURE — 85027 COMPLETE CBC AUTOMATED: CPT | Performed by: STUDENT IN AN ORGANIZED HEALTH CARE EDUCATION/TRAINING PROGRAM

## 2021-03-15 PROCEDURE — 99232 SBSQ HOSP IP/OBS MODERATE 35: CPT | Performed by: INTERNAL MEDICINE

## 2021-03-15 PROCEDURE — 80048 BASIC METABOLIC PNL TOTAL CA: CPT | Performed by: STUDENT IN AN ORGANIZED HEALTH CARE EDUCATION/TRAINING PROGRAM

## 2021-03-15 PROCEDURE — 85007 BL SMEAR W/DIFF WBC COUNT: CPT | Performed by: STUDENT IN AN ORGANIZED HEALTH CARE EDUCATION/TRAINING PROGRAM

## 2021-03-15 RX ADMIN — HEPARIN SODIUM 5000 UNITS: 5000 INJECTION INTRAVENOUS; SUBCUTANEOUS at 21:40

## 2021-03-15 RX ADMIN — INSULIN LISPRO 1 UNITS: 100 INJECTION, SOLUTION INTRAVENOUS; SUBCUTANEOUS at 17:05

## 2021-03-15 RX ADMIN — GABAPENTIN 300 MG: 300 CAPSULE ORAL at 09:58

## 2021-03-15 RX ADMIN — METRONIDAZOLE 500 MG: 500 TABLET, FILM COATED ORAL at 14:19

## 2021-03-15 RX ADMIN — INSULIN LISPRO 1 UNITS: 100 INJECTION, SOLUTION INTRAVENOUS; SUBCUTANEOUS at 21:40

## 2021-03-15 RX ADMIN — ATORVASTATIN CALCIUM 20 MG: 20 TABLET, FILM COATED ORAL at 17:05

## 2021-03-15 RX ADMIN — HEPARIN SODIUM 5000 UNITS: 5000 INJECTION INTRAVENOUS; SUBCUTANEOUS at 14:19

## 2021-03-15 RX ADMIN — LAMOTRIGINE 100 MG: 100 TABLET ORAL at 09:58

## 2021-03-15 RX ADMIN — CEFTAROLINE FOSAMIL 600 MG: 600 POWDER, FOR SOLUTION INTRAVENOUS at 12:11

## 2021-03-15 RX ADMIN — INSULIN LISPRO 1 UNITS: 100 INJECTION, SOLUTION INTRAVENOUS; SUBCUTANEOUS at 12:11

## 2021-03-15 RX ADMIN — CEFTAROLINE FOSAMIL 600 MG: 600 POWDER, FOR SOLUTION INTRAVENOUS at 23:38

## 2021-03-15 RX ADMIN — METRONIDAZOLE 500 MG: 500 TABLET, FILM COATED ORAL at 05:21

## 2021-03-15 RX ADMIN — HEPARIN SODIUM 5000 UNITS: 5000 INJECTION INTRAVENOUS; SUBCUTANEOUS at 05:21

## 2021-03-15 RX ADMIN — TOPIRAMATE 200 MG: 100 TABLET, FILM COATED ORAL at 21:40

## 2021-03-15 RX ADMIN — METRONIDAZOLE 500 MG: 500 TABLET, FILM COATED ORAL at 21:40

## 2021-03-15 RX ADMIN — OXYBUTYNIN 10 MG: 5 TABLET, FILM COATED, EXTENDED RELEASE ORAL at 09:58

## 2021-03-15 NOTE — PROGRESS NOTES
[facility]  Progress Note - Zhanna Shultz 1990, 27 y o  female MRN: [id number]  Unit/Bed#: MS Benita Encounter: [id number]  Primary Care Provider: Jarrod Jackson PA-C   Date and time admitted to hospital: 3/10/2021  7:33 PM    * Osteomyelitis [facility])  Assessment & Plan  · Noted on x-ray of the right great toe with superimposed cellulitis: Formal read pending  · MRI report noted with right 1st interphalangeal joint septic arthropathy with corresponding proximal/distal phalangeal osteomyelitis  Complete tear of the flexor pollicis longus tendon with retraction to the proximal 1st metatarsal    · Reviewed cultures from previous admission which does show 2+ MRSA, 3+ beta hemolytic strep group G, anaerobic with 4+ mixed aerobic/anaerobic (presumptive Prevotella species)  · Current 1 culture noted with MRSA, beta-hemolytic strep group G    · MRSA resistant to clindamycin and patient has a reported vancomycin allergy  · Continue Ceftaroline for MRSA + flagyl for anaerobic coverage started 3/11 per ID recommendation  · Plan for OR tomorrow for right great toe amputation  · NPO after midnight tonight  · Patient is agreeable with above plan  · ID following, podiatry following  Morbid obesity due to excess calories (HCC)  Assessment & Plan  · BMI 42 19, advised on dietary and lifestyle modifications    Cellulitis of toe  Assessment & Plan  · Continue antibiotics as outlined above    Seizure disorder ([address] )  Assessment & Plan  · Continue home Lamictal    Diabetes [facility])  Assessment & Plan  Lab Results   Component Value Date    HGBA1C 6 2 (H) 01/21/2021       Recent Labs     03/14/21  1557 03/14/21  2051 03/15/21  0735 03/15/21  1043   POCGLU 203* 159* 161* 155*       Blood Sugar Average: Last 72 hrs:  (P) 163 [id number]   · Hold home p o   Agents  · Sliding scale insulin, CCO diet    Hyperlipidemia  Assessment & Plan  · Continue statin therapy    VTE Pharmacologic Prophylaxis: Pharmacologic: Heparin    Patient Centered Rounds: I have performed bedside rounds with nursing staff today  Discussions with Specialists or Other Care Team Provider: Podiatry    Education and Discussions with Family / Patient: Spoke with friend Rebekahtania Heinmo    Time Spent for Care: 30 minutes  More than 50% of total time spent on counseling and coordination of care as described above  Current Length of Stay: 5 day(s)    Current Patient Status: Inpatient   Certification Statement: The patient will continue to require additional inpatient hospital stay due to Plan for OR tomorrow for right great to amputation    Discharge Plan:  Expected 48 hours    Code Status: Level 1 - Full Code      Subjective:   Afebrile, hemodynamically stable  Appears comfortable  Not in acute distress  Tolerating antibiotics well  Reports toe pain is controlled  No other events reported  OR tomorrow  Objective:     Vitals:   Temp (24hrs), Av 7 °F (36 5 °C), Min:97 6 °F (36 4 °C), Max:97 7 °F (36 5 °C)    Temp:  [97 6 °F (36 4 °C)-97 7 °F (36 5 °C)] 97 6 °F (36 4 °C)  HR:  [69-76] 69  Resp:  [16-18] 18  BP: (105-106)/(59-60) 105/59  SpO2:  [93 %-95 %] 93 %  Body mass index is 42 19 kg/m²  Input and Output Summary (last 24 hours): Intake/Output Summary (Last 24 hours) at 3/15/2021 1352  Last data filed at 3/15/2021 1309  Gross per 24 hour   Intake 700 ml   Output --   Net 700 ml       Physical Exam:     Physical Exam  Constitutional:       General: She is not in acute distress  Appearance: Normal appearance  She is not ill-appearing  HENT:      Head: Normocephalic and atraumatic  Nose: No rhinorrhea  Mouth/Throat:      Pharynx: No oropharyngeal exudate or posterior oropharyngeal erythema  Eyes:      Extraocular Movements: Extraocular movements intact  Conjunctiva/sclera: Conjunctivae normal       Pupils: Pupils are equal, round, and reactive to light     Neck:      Musculoskeletal: Normal range of motion and neck supple  No neck rigidity  Cardiovascular:      Rate and Rhythm: Normal rate and regular rhythm  Pulses: Normal pulses  Heart sounds: Normal heart sounds  Pulmonary:      Effort: Pulmonary effort is normal  No respiratory distress  Breath sounds: Normal breath sounds  No stridor  No wheezing or rhonchi  Abdominal:      General: Bowel sounds are normal  There is no distension  Palpations: Abdomen is soft  Tenderness: There is no abdominal tenderness  Musculoskeletal:      Comments: Bilateral lower extremity noted with clean, dry, intact dressing  Neurological:      General: No focal deficit present  Mental Status: She is alert and oriented to person, place, and time  Mental status is at baseline  Psychiatric:         Mood and Affect: Mood normal          Behavior: Behavior normal            Additional Data:     Labs:    Results from last 7 days   Lab Units 03/15/21  0530  03/11/21  0525   WBC Thousand/uL 7 50   < > 7 39   HEMOGLOBIN g/dL 11 6   < > 11 1*   HEMATOCRIT % 38 1   < > 36 9   PLATELETS Thousands/uL 295   < > 266   BANDS PCT % 1  --   --    NEUTROS PCT %  --   --  60   LYMPHS PCT %  --   --  25   LYMPHO PCT % 29  --   --    MONOS PCT %  --   --  12   MONO PCT % 9  --   --    EOS PCT % 2  --  2    < > = values in this interval not displayed  Results from last 7 days   Lab Units 03/15/21  0530  03/10/21  2015   SODIUM mmol/L 138   < > 138   POTASSIUM mmol/L 3 6   < > 3 6   CHLORIDE mmol/L 105   < > 101   CO2 mmol/L 22   < > 23   BUN mg/dL 9   < > 9   CREATININE mg/dL 0 75   < > 0 79   ANION GAP mmol/L 11   < > 14*   CALCIUM mg/dL 9 0   < > 8 7   ALBUMIN g/dL  --   --  2 9*   TOTAL BILIRUBIN mg/dL  --   --  0 68   ALK PHOS U/L  --   --  97   ALT U/L  --   --  21   AST U/L  --   --  15   GLUCOSE RANDOM mg/dL 166*   < > 167*    < > = values in this interval not displayed       Results from last 7 days   Lab Units 03/10/21  2014   INR  1 02     Results from last 7 days   Lab Units 03/15/21  1043 03/15/21  0735 03/14/21  2051 03/14/21  1557 03/14/21  1101 03/14/21  0758 03/13/21 2122 03/13/21  1550 03/13/21  1129 03/13/21  0757 03/12/21 2125 03/12/21  1707   POC GLUCOSE mg/dl 155* 161* 159* 203* 207* 145* 193* 167* 203* 123 162* 128         Results from last 7 days   Lab Units 03/11/21  0525 03/10/21  2015 03/10/21  2014   LACTIC ACID mmol/L  --  1 9  --    PROCALCITONIN ng/ml <0 05  --  <0 05           * I Have Reviewed All Lab Data Listed Above  * Additional Pertinent Lab Tests Reviewed: All Labs Within Last 24 Hours Reviewed      Recent Cultures (last 7 days):     Results from last 7 days   Lab Units 03/10/21  2258 03/10/21  2048 03/10/21  2014   BLOOD CULTURE   --  No Growth After 4 Days  No Growth After 4 Days     GRAM STAIN RESULT  No Polys or Bacteria seen  --   --    WOUND CULTURE  1+ Growth of Methicillin Resistant Staphylococcus aureus*  1+ Growth of Beta Hemolytic Streptococcus Group G*  --   --        Last 24 Hours Medication List:   Current Facility-Administered Medications   Medication Dose Route Frequency Provider Last Rate    acetaminophen  650 mg Oral Q6H PRN Dustin Wayne MD      atorvastatin  20 mg Oral Daily With Levi Salamanca MD      ceftaroline (TEFLARO) IV  600 mg Intravenous Q12H Diego Esquivel  mg (03/15/21 1211)    gabapentin  300 mg Oral Daily Dustin Wayne MD      heparin (porcine)  5,000 Units Subcutaneous Q8H [address] Dustin Wayne MD      HYDROmorphone  0 5 mg Intravenous Q4H PRN Dustin Wayne MD      insulin lispro  1-5 Units Subcutaneous HS Dustin Wayne MD      insulin lispro  1-6 Units Subcutaneous TID AC Dustin Wayne MD      lamoTRIgine  100 mg Oral Daily Dustin Wayne MD      metroNIDAZOLE  500 mg Oral Q8H [address] Karen Isbell PA-C      ondansetron  4 mg Intravenous Q6H PRN Tosin Laguna MD      oxybutynin  10 mg Oral Daily Dustin Wayne MD      oxyCODONE  10 mg Oral Q4H PRN Tosin Laguna MD     [facility] oxyCODONE  5 mg Oral Q4H PRN Patsy Hernandez MD      topiramate  200 mg Oral HS Isak Ibarra PA-C          Today, Patient Was Seen By: Veronica Romo MD    ** Please Note: Dictation voice to text software may have been used in the creation of this document   **

## 2021-03-15 NOTE — CASE MANAGEMENT
Patient called for refills on her Tramadol & Valium,mo is on your desk.   Per rounding with SLIM, patient is anticipated to be discharged in 48-72 hours  Patient is going to the OR tomorrow for a toe amputation  Cm department will continue to follow patient through discharge

## 2021-03-15 NOTE — PROGRESS NOTES
Progress Note - Infectious Disease   Tadeo Bruce 27 y o  female MRN: [id number]  Unit/Bed#: -01 Encounter: [id number]      Impression/Recommendations:  1   Right hallux chronic ulcer with cellulitis, abscess, osteomyelitis, septic arthritis, pathologic fracture   Wound cultures with MRSA, GGS, anaerobes   Blood cultures negative   Clinically stable without sepsis  Rec:  ? Continue ceftaroline/Flagyl for now  ? Hallux amputation pending tomorrow per Podiatry  ? Follow temperatures closely  ? Supportive care as per the primary service  ? 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  Severe hives with infusion      Antibiotics:  Ceftaroline #5  Flagyl #5    Subjective:  Patient seen on AM rounds  Denies fevers, chills, sweats, nausea, vomiting, or diarrhea  Two soft stools yesterday  24 Hour Events:  No documented fevers, chills, sweats, nausea, vomiting, or diarrhea  Objective:  Vitals:  Temp:  [97 6 °F (36 4 °C)-97 7 °F (36 5 °C)] 97 6 °F (36 4 °C)  HR:  [69-76] 69  Resp:  [16-18] 18  BP: (105-106)/(59-60) 105/59  SpO2:  [93 %-95 %] 93 %  Temp (24hrs), Av 7 °F (36 5 °C), Min:97 6 °F (36 4 °C), Max:97 7 °F (36 5 °C)  Current: Temperature: 97 6 °F (36 4 °C)    Physical Exam:   General:  No acute distress  Psychiatric:  Awake and alert  Pulmonary:  Normal respiratory excursion without accessory muscle use  Abdomen:  Soft, nontender  Extremities:  Stable right hallux redness and swelling with purulent drainage on gauze  Skin:  No rashes    Lab Results:  I have personally reviewed pertinent labs    Results from last 7 days   Lab Units 03/15/21  0530 21  0519 21  0656  03/10/21  2015   POTASSIUM mmol/L 3 6 3 7 3 7   < > 3  6   CHLORIDE mmol/L 105 107 107   < > 101   CO2 mmol/L 22 22 23   < > 23   BUN mg/dL 9 8 7   < > 9   CREATININE mg/dL 0 75 0 68 0 67   < > 0 79   EGFR ml/min/1 73sq m 107 118 118   < > 101   CALCIUM mg/dL 9 0 8 9 9 0   < > 8 7   AST U/L  --   --   --   --  15   ALT U/L  --   --   --   --  21   ALK PHOS U/L  --   --   --   --  97    < > = values in this interval not displayed  Results from last 7 days   Lab Units 03/15/21  0530 03/14/21  0519 03/12/21  0656   WBC Thousand/uL 7 50 6 57 5 54   HEMOGLOBIN g/dL 11 6 11 3* 10 9*   PLATELETS Thousands/uL 295 290 261     Results from last 7 days   Lab Units 03/10/21  2258 03/10/21  2048 03/10/21  2014   BLOOD CULTURE   --  No Growth After 4 Days  No Growth After 4 Days  GRAM STAIN RESULT  No Polys or Bacteria seen  --   --    WOUND CULTURE  1+ Growth of Methicillin Resistant Staphylococcus aureus*  1+ Growth of Beta Hemolytic Streptococcus Group G*  --   --        Imaging Studies:   I have personally reviewed pertinent imaging study reports and images in PACS  EKG, Pathology, and Other Studies:   I have personally reviewed pertinent reports

## 2021-03-15 NOTE — ASSESSMENT & PLAN NOTE
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 with corresponding proximal/distal phalangeal osteomyelitis  Complete tear of the flexor pollicis longus tendon with retraction to the proximal 1st metatarsal    · Reviewed cultures from previous admission which does show 2+ MRSA, 3+ beta hemolytic strep group G, anaerobic with 4+ mixed aerobic/anaerobic (presumptive Prevotella species)  · Current 1 culture noted with MRSA, beta-hemolytic strep group G    · MRSA resistant to clindamycin and patient has a reported vancomycin allergy  · Continue Ceftaroline for MRSA + flagyl for anaerobic coverage started 3/11 per ID recommendation  · Plan for OR tomorrow for right great toe amputation  · NPO after midnight tonight  · ID following, podiatry following

## 2021-03-15 NOTE — ASSESSMENT & PLAN NOTE
Lab Results   Component Value Date    HGBA1C 6 2 (H) 01/21/2021       Recent Labs     03/14/21  1557 03/14/21 2051 03/15/21  0735 03/15/21  1043   POCGLU 203* 159* 161* 155*       Blood Sugar Average: Last 72 hrs:  (P) 163 [id number]   · Hold home p o   Agents  · Sliding scale insulin, CCO diet

## 2021-03-16 ENCOUNTER — APPOINTMENT (INPATIENT)
Dept: RADIOLOGY | Facility: HOSPITAL | Age: 31
DRG: 617 | End: 2021-03-16
Payer: MEDICARE

## 2021-03-16 ENCOUNTER — ANESTHESIA (INPATIENT)
Dept: PERIOP | Facility: HOSPITAL | Age: 31
DRG: 617 | End: 2021-03-16
Payer: MEDICARE

## 2021-03-16 ENCOUNTER — ANESTHESIA EVENT (INPATIENT)
Dept: PERIOP | Facility: HOSPITAL | Age: 31
DRG: 617 | End: 2021-03-16
Payer: MEDICARE

## 2021-03-16 LAB
ANION GAP SERPL CALCULATED.3IONS-SCNC: 13 MMOL/L (ref 4–13)
BACTERIA BLD CULT: NORMAL
BACTERIA BLD CULT: NORMAL
BUN SERPL-MCNC: 9 MG/DL (ref 5–25)
CALCIUM SERPL-MCNC: 9.1 MG/DL (ref 8.3–10.1)
CHLORIDE SERPL-SCNC: 106 MMOL/L (ref 100–108)
CO2 SERPL-SCNC: 20 MMOL/L (ref 21–32)
CREAT SERPL-MCNC: 0.81 MG/DL (ref 0.6–1.3)
ERYTHROCYTE [DISTWIDTH] IN BLOOD BY AUTOMATED COUNT: 13.7 % (ref 11.6–15.1)
EXT PREGNANCY TEST URINE: NEGATIVE
EXT. CONTROL: NORMAL
GFR SERPL CREATININE-BSD FRML MDRD: 98 ML/MIN/1.73SQ M
GLUCOSE SERPL-MCNC: 128 MG/DL (ref 65–140)
GLUCOSE SERPL-MCNC: 128 MG/DL (ref 65–140)
GLUCOSE SERPL-MCNC: 142 MG/DL (ref 65–140)
GLUCOSE SERPL-MCNC: 146 MG/DL (ref 65–140)
GLUCOSE SERPL-MCNC: 165 MG/DL (ref 65–140)
HCT VFR BLD AUTO: 39.1 % (ref 34.8–46.1)
HGB BLD-MCNC: 11.9 G/DL (ref 11.5–15.4)
MCH RBC QN AUTO: 26.3 PG (ref 26.8–34.3)
MCHC RBC AUTO-ENTMCNC: 30.4 G/DL (ref 31.4–37.4)
MCV RBC AUTO: 86 FL (ref 82–98)
NRBC BLD AUTO-RTO: 0 /100 WBCS
PLATELET # BLD AUTO: 294 THOUSANDS/UL (ref 149–390)
PMV BLD AUTO: 9.7 FL (ref 8.9–12.7)
POTASSIUM SERPL-SCNC: 3.8 MMOL/L (ref 3.5–5.3)
RBC # BLD AUTO: 4.53 MILLION/UL (ref 3.81–5.12)
SODIUM SERPL-SCNC: 139 MMOL/L (ref 136–145)
WBC # BLD AUTO: 6.37 THOUSAND/UL (ref 4.31–10.16)

## 2021-03-16 PROCEDURE — 88305 TISSUE EXAM BY PATHOLOGIST: CPT | Performed by: PATHOLOGY

## 2021-03-16 PROCEDURE — 82948 REAGENT STRIP/BLOOD GLUCOSE: CPT

## 2021-03-16 PROCEDURE — 88311 DECALCIFY TISSUE: CPT | Performed by: PATHOLOGY

## 2021-03-16 PROCEDURE — 73630 X-RAY EXAM OF FOOT: CPT

## 2021-03-16 PROCEDURE — 0Y6P0Z0 DETACHMENT AT RIGHT 1ST TOE, COMPLETE, OPEN APPROACH: ICD-10-PCS | Performed by: PODIATRIST

## 2021-03-16 PROCEDURE — 81025 URINE PREGNANCY TEST: CPT | Performed by: ANESTHESIOLOGY

## 2021-03-16 PROCEDURE — 85027 COMPLETE CBC AUTOMATED: CPT | Performed by: STUDENT IN AN ORGANIZED HEALTH CARE EDUCATION/TRAINING PROGRAM

## 2021-03-16 PROCEDURE — 99232 SBSQ HOSP IP/OBS MODERATE 35: CPT | Performed by: INTERNAL MEDICINE

## 2021-03-16 PROCEDURE — 80048 BASIC METABOLIC PNL TOTAL CA: CPT | Performed by: STUDENT IN AN ORGANIZED HEALTH CARE EDUCATION/TRAINING PROGRAM

## 2021-03-16 RX ORDER — FENTANYL CITRATE 50 UG/ML
INJECTION, SOLUTION INTRAMUSCULAR; INTRAVENOUS AS NEEDED
Status: DISCONTINUED | OUTPATIENT
Start: 2021-03-16 | End: 2021-03-16

## 2021-03-16 RX ORDER — SODIUM CHLORIDE, SODIUM LACTATE, POTASSIUM CHLORIDE, CALCIUM CHLORIDE 600; 310; 30; 20 MG/100ML; MG/100ML; MG/100ML; MG/100ML
125 INJECTION, SOLUTION INTRAVENOUS CONTINUOUS
Status: DISCONTINUED | OUTPATIENT
Start: 2021-03-16 | End: 2021-03-16

## 2021-03-16 RX ORDER — ONDANSETRON 2 MG/ML
4 INJECTION INTRAMUSCULAR; INTRAVENOUS ONCE
Status: DISCONTINUED | OUTPATIENT
Start: 2021-03-16 | End: 2021-03-16 | Stop reason: HOSPADM

## 2021-03-16 RX ORDER — PROPOFOL 10 MG/ML
INJECTION, EMULSION INTRAVENOUS AS NEEDED
Status: DISCONTINUED | OUTPATIENT
Start: 2021-03-16 | End: 2021-03-16

## 2021-03-16 RX ORDER — FENTANYL CITRATE/PF 50 MCG/ML
25 SYRINGE (ML) INJECTION
Status: DISCONTINUED | OUTPATIENT
Start: 2021-03-16 | End: 2021-03-16 | Stop reason: HOSPADM

## 2021-03-16 RX ORDER — SODIUM CHLORIDE, SODIUM LACTATE, POTASSIUM CHLORIDE, CALCIUM CHLORIDE 600; 310; 30; 20 MG/100ML; MG/100ML; MG/100ML; MG/100ML
125 INJECTION, SOLUTION INTRAVENOUS CONTINUOUS
Status: DISCONTINUED | OUTPATIENT
Start: 2021-03-16 | End: 2021-03-17 | Stop reason: HOSPADM

## 2021-03-16 RX ORDER — PROPOFOL 10 MG/ML
INJECTION, EMULSION INTRAVENOUS CONTINUOUS PRN
Status: DISCONTINUED | OUTPATIENT
Start: 2021-03-16 | End: 2021-03-16

## 2021-03-16 RX ORDER — MIDAZOLAM HYDROCHLORIDE 2 MG/2ML
INJECTION, SOLUTION INTRAMUSCULAR; INTRAVENOUS AS NEEDED
Status: DISCONTINUED | OUTPATIENT
Start: 2021-03-16 | End: 2021-03-16

## 2021-03-16 RX ORDER — MAGNESIUM HYDROXIDE 1200 MG/15ML
LIQUID ORAL AS NEEDED
Status: DISCONTINUED | OUTPATIENT
Start: 2021-03-16 | End: 2021-03-16 | Stop reason: HOSPADM

## 2021-03-16 RX ORDER — MORPHINE SULFATE 4 MG/ML
2 INJECTION, SOLUTION INTRAMUSCULAR; INTRAVENOUS
Status: DISCONTINUED | OUTPATIENT
Start: 2021-03-16 | End: 2021-03-16 | Stop reason: HOSPADM

## 2021-03-16 RX ADMIN — HEPARIN SODIUM 5000 UNITS: 5000 INJECTION INTRAVENOUS; SUBCUTANEOUS at 16:01

## 2021-03-16 RX ADMIN — FENTANYL CITRATE 100 MCG: 50 INJECTION, SOLUTION INTRAMUSCULAR; INTRAVENOUS at 10:20

## 2021-03-16 RX ADMIN — SODIUM CHLORIDE, SODIUM LACTATE, POTASSIUM CHLORIDE, AND CALCIUM CHLORIDE 125 ML/HR: .6; .31; .03; .02 INJECTION, SOLUTION INTRAVENOUS at 19:50

## 2021-03-16 RX ADMIN — LAMOTRIGINE 100 MG: 100 TABLET ORAL at 08:31

## 2021-03-16 RX ADMIN — PROPOFOL 40 MG: 10 INJECTION, EMULSION INTRAVENOUS at 10:20

## 2021-03-16 RX ADMIN — SODIUM CHLORIDE, SODIUM LACTATE, POTASSIUM CHLORIDE, AND CALCIUM CHLORIDE 125 ML/HR: .6; .31; .03; .02 INJECTION, SOLUTION INTRAVENOUS at 11:37

## 2021-03-16 RX ADMIN — PROPOFOL 80 MCG/KG/MIN: 10 INJECTION, EMULSION INTRAVENOUS at 10:20

## 2021-03-16 RX ADMIN — HEPARIN SODIUM 5000 UNITS: 5000 INJECTION INTRAVENOUS; SUBCUTANEOUS at 22:28

## 2021-03-16 RX ADMIN — INSULIN LISPRO 1 UNITS: 100 INJECTION, SOLUTION INTRAVENOUS; SUBCUTANEOUS at 22:28

## 2021-03-16 RX ADMIN — CEFTAROLINE FOSAMIL 600 MG: 600 POWDER, FOR SOLUTION INTRAVENOUS at 13:20

## 2021-03-16 RX ADMIN — OXYBUTYNIN 10 MG: 5 TABLET, FILM COATED, EXTENDED RELEASE ORAL at 08:31

## 2021-03-16 RX ADMIN — GABAPENTIN 300 MG: 300 CAPSULE ORAL at 08:31

## 2021-03-16 RX ADMIN — METRONIDAZOLE 500 MG: 500 TABLET, FILM COATED ORAL at 05:26

## 2021-03-16 RX ADMIN — SODIUM CHLORIDE, SODIUM LACTATE, POTASSIUM CHLORIDE, AND CALCIUM CHLORIDE: .6; .31; .03; .02 INJECTION, SOLUTION INTRAVENOUS at 10:16

## 2021-03-16 RX ADMIN — TOPIRAMATE 200 MG: 100 TABLET, FILM COATED ORAL at 22:28

## 2021-03-16 RX ADMIN — ATORVASTATIN CALCIUM 20 MG: 20 TABLET, FILM COATED ORAL at 16:01

## 2021-03-16 RX ADMIN — MIDAZOLAM HYDROCHLORIDE 1 MG: 1 INJECTION, SOLUTION INTRAMUSCULAR; INTRAVENOUS at 10:16

## 2021-03-16 RX ADMIN — HEPARIN SODIUM 5000 UNITS: 5000 INJECTION INTRAVENOUS; SUBCUTANEOUS at 05:26

## 2021-03-16 NOTE — PLAN OF CARE
Problem: PAIN - ADULT  Goal: Verbalizes/displays adequate comfort level or baseline comfort level  Description: Interventions:  - Encourage patient to monitor pain and request assistance  - Assess pain using appropriate pain scale  - Administer analgesics based on type and severity of pain and evaluate response  - Implement non-pharmacological measures as appropriate and evaluate response  - Consider cultural and social influences on pain and pain management  - Notify physician/advanced practitioner if interventions unsuccessful or patient reports new pain  Outcome: Progressing     Problem: INFECTION - ADULT  Goal: Absence or prevention of progression during hospitalization  Description: INTERVENTIONS:  - Assess and monitor for signs and symptoms of infection  - Monitor lab/diagnostic results  - Monitor all insertion sites, i e  indwelling lines, tubes, and drains  - Monitor endotracheal if appropriate and nasal secretions for changes in amount and color  - [place] appropriate cooling/warming therapies per order  - Administer medications as ordered  - Instruct and encourage patient and family to use good hand hygiene technique  - Identify and instruct in appropriate isolation precautions for identified infection/condition  Outcome: Progressing

## 2021-03-16 NOTE — ASSESSMENT & PLAN NOTE
Lab Results   Component Value Date    HGBA1C 6 2 (H) 01/21/2021       Recent Labs     03/15/21  2111 03/16/21  0734 03/16/21  1055 03/16/21  1119   POCGLU 191* 146* 128 128       Blood Sugar Average: Last 72 hrs:  (P) 165 4   · Hold home p o   Agents  · Sliding scale insulin, CCO diet  · Monitor blood sugars

## 2021-03-16 NOTE — PROGRESS NOTES
[address] [facility]  Progress Note - Rajeev Álvarez 1990, 27 y o  female MRN: [id number]  Unit/Bed#: -Timothy Encounter: [id number]  Primary Care Provider: Denise Wright PA-C   Date and time admitted to hospital: 3/10/2021  7:33 PM    * Osteomyelitis [facility])  Assessment & Plan  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 with corresponding proximal/distal phalangeal osteomyelitis  Complete tear of the flexor pollicis longus tendon with retraction to the proximal 1st metatarsal  · Reviewed cultures from previous admission which does show 2+ MRSA, 3+ beta hemolytic strep group G, anaerobic with 4+ mixed aerobic/anaerobic (presumptive Prevotella species)  · Current 1 culture noted with MRSA, beta-hemolytic strep group G    · MRSA resistant to clindamycin and patient has a reported vancomycin allergy  Plan:  Infectious disease and podiatry following  Patient planned for amputation today 3/16  Continue Ceftaroline for MRSA per ID recommendation  Wait final results and podiatry recommendation before making any further decisions  PT/OT evaluation  Monitor clinically, temperature curve, white count    Morbid obesity due to excess calories (HCC)  Assessment & Plan  · BMI 42 19, advised on dietary and lifestyle modifications    Cellulitis of toe  Assessment & Plan  · Continue antibiotics as outlined above    Seizure disorder (HCC)  Assessment & Plan  · Continue home Lamictal    Diabetes [facility])  Assessment & Plan  Lab Results   Component Value Date    HGBA1C 6 2 (H) 01/21/2021       Recent Labs     03/15/21  2111 03/16/21  0734 03/16/21  1055 03/16/21  1119   POCGLU 191* 146* 128 128       Blood Sugar Average: Last 72 hrs:  (P) 165 4   · Hold home p o   Agents  · Sliding scale insulin, CCO diet  · Monitor blood sugars    Hyperlipidemia  Assessment & Plan  · Continue statin therapy        VTE Pharmacologic Prophylaxis:   Pharmacologic: Heparin  Mechanical VTE Prophylaxis in Place: Yes    Patient Centered Rounds: I have performed bedside rounds with nursing staff today  Discussions with Specialists or Other Care Team Provider:  Noted notes from specialists    Education and Discussions with Family / Patient:  Patient herself    Time Spent for Care: 30 minutes  More than 50% of total time spent on counseling and coordination of care as described above  Current Length of Stay: 6 day(s)    Current Patient Status: Inpatient   Certification Statement: The patient will continue to require additional inpatient hospital stay due to Need for IV antibiotics    Discharge Plan:  Once stable    Code Status: Level 1 - Full Code      Subjective:     Patient evaluated this afternoon after surgery  Overall she feels okay, afebrile  No other events reported  Objective:     Vitals:   Temp (24hrs), Av 9 °F (36 6 °C), Min:97 5 °F (36 4 °C), Max:98 3 °F (36 8 °C)    Temp:  [97 5 °F (36 4 °C)-98 3 °F (36 8 °C)] 97 5 °F (36 4 °C)  HR:  [51-83] 66  Resp:  [13-20] 17  BP: ()/(47-67) 99/61  SpO2:  [91 %-96 %] 94 %  Body mass index is 42 19 kg/m²  Input and Output Summary (last 24 hours): Intake/Output Summary (Last 24 hours) at 3/16/2021 1420  Last data filed at 3/16/2021 1240  Gross per 24 hour   Intake 221 25 ml   Output --   Net 221 25 ml       Physical Exam:     Physical Exam  Vitals signs and nursing note reviewed  Constitutional:       Appearance: Normal appearance  She is obese  Comments: Middle-age female in bed, awake   HENT:      Head: Normocephalic and atraumatic  Right Ear: External ear normal       Left Ear: External ear normal       Nose: Nose normal  No congestion  Mouth/Throat:      Mouth: Mucous membranes are moist       Pharynx: Oropharynx is clear  No oropharyngeal exudate or posterior oropharyngeal erythema     Eyes:      General: No scleral icterus  Right eye: No discharge  Left eye: No discharge  Extraocular Movements: Extraocular movements intact  Conjunctiva/sclera: Conjunctivae normal       Pupils: Pupils are equal, round, and reactive to light  Neck:      Musculoskeletal: Normal range of motion and neck supple  No neck rigidity or muscular tenderness  Cardiovascular:      Rate and Rhythm: Normal rate and regular rhythm  Pulses: Normal pulses  Heart sounds: Normal heart sounds  No murmur  No friction rub  No gallop  Pulmonary:      Effort: Pulmonary effort is normal  No respiratory distress  Breath sounds: Normal breath sounds  No stridor  No wheezing, rhonchi or rales  Chest:      Chest wall: No tenderness  Abdominal:      General: Abdomen is flat  Bowel sounds are normal  There is no distension  Palpations: Abdomen is soft  There is no mass  Tenderness: There is no abdominal tenderness  There is no guarding or rebound  Musculoskeletal: Normal range of motion  General: No swelling, tenderness, deformity or signs of injury  Comments: Status post amputation right great toe  Currently dressed  Skin:     General: Skin is warm and dry  Capillary Refill: Capillary refill takes less than 2 seconds  Coloration: Skin is not jaundiced or pale  Findings: No bruising, erythema, lesion or rash  Neurological:      General: No focal deficit present  Mental Status: She is alert and oriented to person, place, and time  Mental status is at baseline  Cranial Nerves: No cranial nerve deficit  Sensory: No sensory deficit  Motor: No weakness  Coordination: Coordination normal    Psychiatric:         Mood and Affect: Mood normal          Behavior: Behavior normal          Thought Content:  Thought content normal          Judgment: Judgment normal          Additional Data:     Labs:    Results from last 7 days   Lab Units 03/16/21  0529 03/15/21  0521 03/11/21  0525   WBC Thousand/uL 6 37 7 50   < > 7 39   HEMOGLOBIN g/dL 11 9 11 6   < > 11 1*   HEMATOCRIT % 39 1 38 1   < > 36 9   PLATELETS Thousands/uL 294 295   < > 266   BANDS PCT %  --  1  --   --    NEUTROS PCT %  --   --   --  60   LYMPHS PCT %  --   --   --  25   LYMPHO PCT %  --  29  --   --    MONOS PCT %  --   --   --  12   MONO PCT %  --  9  --   --    EOS PCT %  --  2  --  2    < > = values in this interval not displayed  Results from last 7 days   Lab Units 03/16/21  0529  03/10/21  2015   SODIUM mmol/L 139   < > 138   POTASSIUM mmol/L 3 8   < > 3 6   CHLORIDE mmol/L 106   < > 101   CO2 mmol/L 20*   < > 23   BUN mg/dL 9   < > 9   CREATININE mg/dL 0 81   < > 0 79   ANION GAP mmol/L 13   < > 14*   CALCIUM mg/dL 9 1   < > 8 7   ALBUMIN g/dL  --   --  2 9*   TOTAL BILIRUBIN mg/dL  --   --  0 68   ALK PHOS U/L  --   --  97   ALT U/L  --   --  21   AST U/L  --   --  15   GLUCOSE RANDOM mg/dL 165*   < > 167*    < > = values in this interval not displayed  Results from last 7 days   Lab Units 03/10/21  2014   INR  1 02     Results from last 7 days   Lab Units 03/16/21  1119 03/16/21  1055 03/16/21  0734 03/15/21  2111 03/15/21  1704 03/15/21  1043 03/15/21  0735 03/14/21  2051 03/14/21  1557 03/14/21  1101 03/14/21  0758 03/13/21  2122   POC GLUCOSE mg/dl 128 128 146* 191* 172* 155* 161* 159* 203* 207* 145* 193*         Results from last 7 days   Lab Units 03/11/21  0525 03/10/21  2015 03/10/21  2014   LACTIC ACID mmol/L  --  1 9  --    PROCALCITONIN ng/ml <0 05  --  <0 05           * I Have Reviewed All Lab Data Listed Above  * Additional Pertinent Lab Tests Reviewed: [place] [address] Admission Reviewed      Recent Cultures (last 7 days):     Results from last 7 days   Lab Units 03/10/21  2258 03/10/21  2048 03/10/21  2014   BLOOD CULTURE   --  No Growth After 5 Days  No Growth After 5 Days     GRAM STAIN RESULT  No Polys or Bacteria seen  --   --    WOUND CULTURE  1+ Growth of Methicillin Resistant Staphylococcus aureus*  1+ Growth of Beta Hemolytic Streptococcus Group G*  --   --        Last 24 Hours Medication List:   Current Facility-Administered Medications   Medication Dose Route Frequency Provider Last Rate    acetaminophen  650 mg Oral Q6H PRN [facility], DPM      atorvastatin  20 mg Oral Daily With [facility], DPM      ceftaroline [facility]) IV  600 mg Intravenous Q12H [facility],  mg (03/16/21 1320)    gabapentin  300 mg Oral Daily [facility], DPM      heparin (porcine)  5,000 Units Subcutaneous Q8H [address], [place]      HYDROmorphone  0 5 mg Intravenous Q4H PRN [facility], DPM      insulin lispro  1-5 Units Subcutaneous [facility], DPM      insulin lispro  1-6 Units Subcutaneous TID [facility], DPM      lactated ringers  125 mL/hr Intravenous Continuous [facility],  mL/hr (03/16/21 1137)    lamoTRIgine  100 mg Oral Daily [facility], DPM      ondansetron  4 mg Intravenous Q6H PRN [facility], DPM      oxybutynin  10 mg Oral Daily [place], [place]      oxyCODONE  10 mg Oral Q4H PRN [facility], DPM      oxyCODONE  5 mg Oral Q4H PRN [facility], DPM      topiramate  200 mg Oral [facility], DPM          Today, Patient Was Seen By: Tracey Oshea MD    ** Please Note: Dictation voice to text software may have been used in the creation of this document   **

## 2021-03-16 NOTE — ANESTHESIA PREPROCEDURE EVALUATION
Procedure:  AMPUTATION TOE (Right Toe)    Relevant Problems   CARDIO   (+) Hyperlipidemia   (+) Other chest pain      NEURO/PSYCH   (+) Seizure disorder (HCC)      Other   (+) Osteomyelitis (HCC)        Physical Exam    Airway    Mallampati score: II  TM Distance: >3 FB  Neck ROM: full     Dental   No notable dental hx     Cardiovascular  Rhythm: regular, Rate: normal, Cardiovascular exam normal    Pulmonary  Pulmonary exam normal Breath sounds clear to auscultation,     Other Findings        Anesthesia Plan  ASA Score- 2     Anesthesia Type- IV sedation with anesthesia with ASA Monitors  Additional Monitors:   Airway Plan:           Plan Factors-    Chart reviewed  EKG reviewed  Existing labs reviewed  Patient is not a current smoker  Patient not instructed to abstain from smoking on day of procedure  Patient did not smoke on day of surgery  There is medical exclusion for perioperative obstructive sleep apnea risk education  Induction- intravenous  Postoperative Plan- Plan for postoperative opioid use  Informed Consent- Anesthetic plan and risks discussed with patient  I personally reviewed this patient with the CRNA  Discussed and agreed on the Anesthesia Plan with the CRNA  Rene Alfred

## 2021-03-16 NOTE — PROGRESS NOTES
Progress Note - Infectious Disease   Venessa Grace 27 y o  female MRN: [id number]  Unit/Bed#: -01 Encounter: [id number]      Impression/Recommendations:  1   Right hallux chronic ulcer with cellulitis, abscess, osteomyelitis, septic arthritis, pathologic fracture   Wound cultures with MRSA, GGS, also recent anaerobes   Blood cultures negative   Clinically stable without sepsis  Status post right hallux amputation/disarticulation 3/16 with presumed surgical cure  Rec:  ? Continue ceftaroline for now  ? D/C Flagyl  ? When ready for D/C can change to Duricef 500 mg PO Q12 and Bactrim DS 1 tab PO Q12 with plan to continue through 3/23 to complete 7 days total postoperative antibiotics  ? 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  Severe hives with infusion      The patient is stable from an ID standpoint  Antibiotics:  Ceftaroline #6  Flagyl #6    Subjective:  Patient seen on AM rounds  Doing OK  Denies fevers, chills, sweats, nausea, vomiting, or diarrhea  Denies pain  24 Hour Events:  No documented fevers, chills, sweats, nausea, vomiting, or diarrhea  Went to OR this AM for toe amputation  Objective:  Vitals:  Temp:  [97 5 °F (36 4 °C)-98 3 °F (36 8 °C)] 97 5 °F (36 4 °C)  HR:  [51-83] 83  Resp:  [13-20] 17  BP: ()/(47-67) 95/55  SpO2:  [91 %-96 %] 94 %  Temp (24hrs), Av 9 °F (36 6 °C), Min:97 5 °F (36 4 °C), Max:98 3 °F (36 8 °C)  Current: Temperature: 97 5 °F (36 4 °C)    Physical Exam:   General:  No acute distress  Psychiatric:  Awake and alert  Pulmonary:  Normal respiratory excursion without accessory muscle use  Abdomen:  Soft, nontender  Extremities:  No edema  Skin:  No rashes    Lab Results:  I have personally reviewed pertinent labs    Results from last 7 days   Lab Units 03/16/21  0529 03/15/21  0530 03/14/21  0519  03/10/21  2015   POTASSIUM mmol/L 3 8 3 6 3 7   < > 3 6   CHLORIDE mmol/L 106 105 107   < > 101   CO2 mmol/L 20* 22 22   < > 23   BUN mg/dL 9 9 8   < > 9   CREATININE mg/dL 0 81 0 75 0 68   < > 0 79   EGFR ml/min/1 73sq m 98 107 118   < > 101   CALCIUM mg/dL 9 1 9 0 8 9   < > 8 7   AST U/L  --   --   --   --  15   ALT U/L  --   --   --   --  21   ALK PHOS U/L  --   --   --   --  97    < > = values in this interval not displayed  Results from last 7 days   Lab Units 03/16/21  0529 03/15/21  0530 03/14/21  0519   WBC Thousand/uL 6 37 7 50 6 57   HEMOGLOBIN g/dL 11 9 11 6 11 3*   PLATELETS Thousands/uL 294 295 290     Results from last 7 days   Lab Units 03/10/21  2258 03/10/21  2048 03/10/21  2014   BLOOD CULTURE   --  No Growth After 5 Days  No Growth After 5 Days  GRAM STAIN RESULT  No Polys or Bacteria seen  --   --    WOUND CULTURE  1+ Growth of Methicillin Resistant Staphylococcus aureus*  1+ Growth of Beta Hemolytic Streptococcus Group G*  --   --        Imaging Studies:   I have personally reviewed pertinent imaging study reports and images in PACS  EKG, Pathology, and Other Studies:   I have personally reviewed pertinent reports

## 2021-03-16 NOTE — OP NOTE
OPERATIVE REPORT  PATIENT NAME: Stevie So    :  1990  MRN: [id number]  Pt Location: [facility] OR ROOM 02    SURGERY DATE: 3/16/2021    Surgeon(s) and Role:     Flip Tan DPM - Primary    Preop Diagnosis:  Osteomyelitis ([facility], [place] [address] ) [M86 9]    Post-Op Diagnosis Codes:     * Osteomyelitis ([address] ) [M86 9]    Procedure(s) (LRB):  AMPUTATION TOE (Right)    Specimen(s):  ID Type Source Tests Collected by Time Destination   1 : Right Foot, Great Toe Tissue Toe, Right TISSUE EXAM ERWIN [facility] 3/16/2021 1035        Estimated Blood Loss:   Minimal    Drains:  * No LDAs found *    Anesthesia Type:   IV Sedation with Anesthesia    Operative Indications:  Osteomyelitis ([facility] [place] [address] ) [M86 9]    Operative Findings:  Nonhealing wound, soft grey bone     Complications:   None    Procedure and Technique:  Under mild sedation, the patient was brought back to the OR and remained supine on the stretcher, an 18 inch ankle tourniquet is placed about the right ankle  Once MAC sedation was achieved, a local injection of a 1:1 mixture of 1% lidocaine plain and 0 5% marcaine plain was injected 10 ccs about the right great toe  The foot is then scrubbed prepped and draped in the usual manner  Attention is directed to the right foot  A nonhealing ulcer to bone is noted on the right foot under the right foot great toe  Using an eschmarch bandage the right foot is exsanguinated and the tourniquet is inflated to 250 mmHg  An incision line is drawn about the right great toe circumferentially  Using a 15 blade an incision was made deep to bone at the MPJ  The toe is debrided away en toto and passed off for Pathology  The area is flushed with copious amounts of normal sterile saline  The deep tissues are examined and the head of the metatarsal is hard and white  The deep tisssues are coapted using 3 0 vicryl simple interrupted sutures and the skin is coapted using 3 0 nylon simple interrupted sutures    The tourniquet is deflated and a hyperemic response is noted to the entire right foot  The foot is dressed with xeroform, 4x4s, DSD and an ACE  She is transferred to PACU with VS stable and NVS intact to pre-exam status  She will recover there a short time and then return to her room        Patient Disposition:  PACU     SIGNATURE: Sarina Viveros DPM  DATE: March 16, 2021  TIME: 11:54 AM

## 2021-03-16 NOTE — CASE MANAGEMENT
Per rounding with SLIM, patient went to the OR today for toe amputation  She will transition to oral abx afterwards  PT/OT to see after amputation  Patient is likely to be discharged in 24-48 hours  Cm department will continue to follow patient through discharge

## 2021-03-16 NOTE — ANESTHESIA POSTPROCEDURE EVALUATION
Post-Op Assessment Note    CV Status:  Stable  Pain Score: 1    Pain management: adequate     Mental Status:  Sleepy   Hydration Status:  Stable   PONV Controlled:  Controlled      Post Op Vitals Reviewed: Yes      Staff: Anesthesiologist, CRNA         No complications documented      /59 (03/16/21 1045)    Temp 98 2 °F (36 8 °C) (03/16/21 1045)    Pulse 76 (03/16/21 1045)   Resp 15 (03/16/21 1045)    SpO2

## 2021-03-16 NOTE — ASSESSMENT & PLAN NOTE
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 with corresponding proximal/distal phalangeal osteomyelitis  Complete tear of the flexor pollicis longus tendon with retraction to the proximal 1st metatarsal  · Reviewed cultures from previous admission which does show 2+ MRSA, 3+ beta hemolytic strep group G, anaerobic with 4+ mixed aerobic/anaerobic (presumptive Prevotella species)  · Current 1 culture noted with MRSA, beta-hemolytic strep group G    · MRSA resistant to clindamycin and patient has a reported vancomycin allergy  Plan:  Infectious disease and podiatry following  Patient planned for amputation today 3/16  Continue Ceftaroline for MRSA per ID recommendation  Wait final results and podiatry recommendation before making any further decisions    PT/OT evaluation  Monitor clinically, temperature curve, white count

## 2021-03-17 VITALS
RESPIRATION RATE: 18 BRPM | HEART RATE: 64 BPM | SYSTOLIC BLOOD PRESSURE: 96 MMHG | BODY MASS INDEX: 42.24 KG/M2 | WEIGHT: 253.53 LBS | TEMPERATURE: 97.8 F | DIASTOLIC BLOOD PRESSURE: 54 MMHG | OXYGEN SATURATION: 94 % | HEIGHT: 65 IN

## 2021-03-17 LAB
ANION GAP SERPL CALCULATED.3IONS-SCNC: 11 MMOL/L (ref 4–13)
BASOPHILS # BLD AUTO: 0.04 THOUSANDS/ΜL (ref 0–0.1)
BASOPHILS NFR BLD AUTO: 1 % (ref 0–1)
BUN SERPL-MCNC: 7 MG/DL (ref 5–25)
CALCIUM SERPL-MCNC: 8.9 MG/DL (ref 8.3–10.1)
CHLORIDE SERPL-SCNC: 107 MMOL/L (ref 100–108)
CO2 SERPL-SCNC: 21 MMOL/L (ref 21–32)
CREAT SERPL-MCNC: 0.72 MG/DL (ref 0.6–1.3)
EOSINOPHIL # BLD AUTO: 0.07 THOUSAND/ΜL (ref 0–0.61)
EOSINOPHIL NFR BLD AUTO: 1 % (ref 0–6)
ERYTHROCYTE [DISTWIDTH] IN BLOOD BY AUTOMATED COUNT: 13.7 % (ref 11.6–15.1)
GFR SERPL CREATININE-BSD FRML MDRD: 113 ML/MIN/1.73SQ M
GLUCOSE SERPL-MCNC: 146 MG/DL (ref 65–140)
GLUCOSE SERPL-MCNC: 156 MG/DL (ref 65–140)
GLUCOSE SERPL-MCNC: 178 MG/DL (ref 65–140)
GLUCOSE SERPL-MCNC: 186 MG/DL (ref 65–140)
GLUCOSE SERPL-MCNC: 191 MG/DL (ref 65–140)
HCT VFR BLD AUTO: 37.2 % (ref 34.8–46.1)
HGB BLD-MCNC: 11.3 G/DL (ref 11.5–15.4)
IMM GRANULOCYTES # BLD AUTO: 0.15 THOUSAND/UL (ref 0–0.2)
IMM GRANULOCYTES NFR BLD AUTO: 2 % (ref 0–2)
LYMPHOCYTES # BLD AUTO: 1.7 THOUSANDS/ΜL (ref 0.6–4.47)
LYMPHOCYTES NFR BLD AUTO: 25 % (ref 14–44)
MCH RBC QN AUTO: 26 PG (ref 26.8–34.3)
MCHC RBC AUTO-ENTMCNC: 30.4 G/DL (ref 31.4–37.4)
MCV RBC AUTO: 86 FL (ref 82–98)
MONOCYTES # BLD AUTO: 0.68 THOUSAND/ΜL (ref 0.17–1.22)
MONOCYTES NFR BLD AUTO: 10 % (ref 4–12)
NEUTROPHILS # BLD AUTO: 4.28 THOUSANDS/ΜL (ref 1.85–7.62)
NEUTS SEG NFR BLD AUTO: 61 % (ref 43–75)
NRBC BLD AUTO-RTO: 0 /100 WBCS
PLATELET # BLD AUTO: 259 THOUSANDS/UL (ref 149–390)
PMV BLD AUTO: 9.6 FL (ref 8.9–12.7)
POTASSIUM SERPL-SCNC: 3.7 MMOL/L (ref 3.5–5.3)
RBC # BLD AUTO: 4.34 MILLION/UL (ref 3.81–5.12)
SODIUM SERPL-SCNC: 139 MMOL/L (ref 136–145)
WBC # BLD AUTO: 6.92 THOUSAND/UL (ref 4.31–10.16)

## 2021-03-17 PROCEDURE — 97163 PT EVAL HIGH COMPLEX 45 MIN: CPT

## 2021-03-17 PROCEDURE — 99232 SBSQ HOSP IP/OBS MODERATE 35: CPT | Performed by: INTERNAL MEDICINE

## 2021-03-17 PROCEDURE — 82948 REAGENT STRIP/BLOOD GLUCOSE: CPT

## 2021-03-17 PROCEDURE — 99239 HOSP IP/OBS DSCHRG MGMT >30: CPT | Performed by: INTERNAL MEDICINE

## 2021-03-17 PROCEDURE — 97116 GAIT TRAINING THERAPY: CPT

## 2021-03-17 PROCEDURE — 85025 COMPLETE CBC W/AUTO DIFF WBC: CPT | Performed by: INTERNAL MEDICINE

## 2021-03-17 PROCEDURE — 80048 BASIC METABOLIC PNL TOTAL CA: CPT | Performed by: INTERNAL MEDICINE

## 2021-03-17 RX ORDER — CEFADROXIL 500 MG/1
500 CAPSULE ORAL EVERY 12 HOURS SCHEDULED
Qty: 14 CAPSULE | Refills: 0 | Status: SHIPPED | OUTPATIENT
Start: 2021-03-17 | End: 2021-03-24

## 2021-03-17 RX ORDER — SULFAMETHOXAZOLE AND TRIMETHOPRIM 800; 160 MG/1; MG/1
1 TABLET ORAL EVERY 12 HOURS SCHEDULED
Qty: 14 TABLET | Refills: 0 | Status: SHIPPED | OUTPATIENT
Start: 2021-03-17 | End: 2021-03-24

## 2021-03-17 RX ADMIN — LAMOTRIGINE 100 MG: 100 TABLET ORAL at 09:50

## 2021-03-17 RX ADMIN — SODIUM CHLORIDE, SODIUM LACTATE, POTASSIUM CHLORIDE, AND CALCIUM CHLORIDE 125 ML/HR: .6; .31; .03; .02 INJECTION, SOLUTION INTRAVENOUS at 14:13

## 2021-03-17 RX ADMIN — CEFTAROLINE FOSAMIL 600 MG: 600 POWDER, FOR SOLUTION INTRAVENOUS at 00:17

## 2021-03-17 RX ADMIN — ATORVASTATIN CALCIUM 20 MG: 20 TABLET, FILM COATED ORAL at 17:18

## 2021-03-17 RX ADMIN — HEPARIN SODIUM 5000 UNITS: 5000 INJECTION INTRAVENOUS; SUBCUTANEOUS at 06:12

## 2021-03-17 RX ADMIN — CEFTAROLINE FOSAMIL 600 MG: 600 POWDER, FOR SOLUTION INTRAVENOUS at 11:56

## 2021-03-17 RX ADMIN — HEPARIN SODIUM 5000 UNITS: 5000 INJECTION INTRAVENOUS; SUBCUTANEOUS at 14:46

## 2021-03-17 RX ADMIN — INSULIN LISPRO 1 UNITS: 100 INJECTION, SOLUTION INTRAVENOUS; SUBCUTANEOUS at 16:20

## 2021-03-17 RX ADMIN — GABAPENTIN 300 MG: 300 CAPSULE ORAL at 09:51

## 2021-03-17 RX ADMIN — SODIUM CHLORIDE, SODIUM LACTATE, POTASSIUM CHLORIDE, AND CALCIUM CHLORIDE 125 ML/HR: .6; .31; .03; .02 INJECTION, SOLUTION INTRAVENOUS at 04:51

## 2021-03-17 RX ADMIN — INSULIN LISPRO 2 UNITS: 100 INJECTION, SOLUTION INTRAVENOUS; SUBCUTANEOUS at 11:42

## 2021-03-17 RX ADMIN — OXYBUTYNIN 10 MG: 5 TABLET, FILM COATED, EXTENDED RELEASE ORAL at 09:50

## 2021-03-17 NOTE — PROGRESS NOTES
Progress Note - Linsey Gonzalez 27 y o  female MRN: [id number]    Unit/Bed#: -01 Encounter: [id number]      Assessment:  1) POD #1 right foot great toe amputation secondary to osteomyelitis  2) Ulcer left great toe     Plan:  1) Patient is okay to D/C from Podiatry standpoint   2) Patient to f/u in [address] with dr Chase Sy Thursday  3) oral antibiotics x 1 week  4) bandages to remain intact to the right foot until f/u  5) Left foot maxsorb and DSD daily      Subjective:   Patient is seen bedside resting comfortably  NAD  Objective:     Vitals: Blood pressure 96/55, pulse 74, temperature 98 5 °F (36 9 °C), resp  rate 17, height 5' 5" (1 651 m), weight 115 kg (253 lb 8 5 oz), SpO2 94 %  ,Body mass index is 42 19 kg/m²  Intake/Output Summary (Last 24 hours) at 3/17/2021 0819  Last data filed at 3/17/2021 0451  Gross per 24 hour   Intake 2381 25 ml   Output 1800 ml   Net 581 25 ml       Physical Exam: bandages intact no strikethrough drainage     Invasive Devices     Peripheral Intravenous Line            Peripheral IV 03/14/21 Dorsal (posterior); Left Wrist 2 days

## 2021-03-17 NOTE — QUICK NOTE
Was paged by nursing staff regarding pt wanting to leave the hospital  Upon further review of the chart and discussion with LEXII attending, pt was medically cleared for discharge today, however was waiting on rehab placement  Pt seen at bedside, she is now reporting that she does not want to go to rehab but instead wants to go home  Extensively discussed with the patient concerns for going home with mobility and how she will get in and out of her car  She continues to decline staying overnight and wants to go home now  Reports that her friend will drive her home  Pt's abx prescriptions have been sent to the pharmacy and she was advised to follow up with Dr Julissa Stinson in the outpatient setting  Encourage NWB to the right lower extremity  Pt was discharged in stable condition, no indication for AMA at this time as pt is medically stable, refusing rehab  Discharge summary to follow per attending       Kellie Sunshine PA-C

## 2021-03-17 NOTE — ASSESSMENT & PLAN NOTE
Lab Results   Component Value Date    HGBA1C 6 2 (H) 01/21/2021       Recent Labs     03/16/21  1602 03/16/21  2123 03/17/21  0722 03/17/21  1121   POCGLU 142* 186* 146* 191*       Blood Sugar Average: Last 72 hrs:  (P) 164   · Hold home p o   Agents  · Sliding scale insulin, CCO diet  · Monitor blood sugars

## 2021-03-17 NOTE — ASSESSMENT & PLAN NOTE
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 with corresponding proximal/distal phalangeal osteomyelitis  Complete tear of the flexor pollicis longus tendon with retraction to the proximal 1st metatarsal  · Reviewed cultures from previous admission which does show 2+ MRSA, 3+ beta hemolytic strep group G, anaerobic with 4+ mixed aerobic/anaerobic (presumptive Prevotella species)  · Current 1 culture noted with MRSA, beta-hemolytic strep group G    · MRSA resistant to clindamycin and patient has a reported vancomycin allergy      Plan:  Infectious disease and podiatry following - they have cleared the patient for discharge  Patient underwent amputation on 3/16  Patient originally was on ceftaroline for MRSA, Can be transitioned to PO now, plan as per ID: Duricef 500 mg PO Q12 and Bactrim DS 1 tab PO Q12 with plan to continue through 3/23   PT/OT evaluation has been done - they do not believe the patient is steady enough to ambulate given postoperative state and lower extremity wounds, they think she would benefit from rehab  Case management on board, rehab placement in progress  Monitor clinically, temperature curve, white count

## 2021-03-17 NOTE — PROGRESS NOTES
[address] [facility]  Progress Note - Gertrudis Campa 1990, 27 y o  female MRN: [id number]  Unit/Bed#: -Timothy Encounter: [id number]  Primary Care Provider: Marcel Arreaga PA-C   Date and time admitted to hospital: 3/10/2021  7:33 PM    * Osteomyelitis [facility])  Assessment & Plan  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 with corresponding proximal/distal phalangeal osteomyelitis  Complete tear of the flexor pollicis longus tendon with retraction to the proximal 1st metatarsal  · Reviewed cultures from previous admission which does show 2+ MRSA, 3+ beta hemolytic strep group G, anaerobic with 4+ mixed aerobic/anaerobic (presumptive Prevotella species)  · Current 1 culture noted with MRSA, beta-hemolytic strep group G    · MRSA resistant to clindamycin and patient has a reported vancomycin allergy      Plan:  Infectious disease and podiatry following - they have cleared the patient for discharge  Patient underwent amputation on 3/16  Patient originally was on ceftaroline for MRSA, Can be transitioned to PO now, plan as per ID: Duricef 500 mg PO Q12 and Bactrim DS 1 tab PO Q12 with plan to continue through 3/23   PT/OT evaluation has been done - they do not believe the patient is steady enough to ambulate given postoperative state and lower extremity wounds, they think she would benefit from rehab  Case management on board, rehab placement in progress  Monitor clinically, temperature curve, white count    Morbid obesity due to excess calories (HCC)  Assessment & Plan  · BMI 42 19, advised on dietary and lifestyle modifications    Cellulitis of toe  Assessment & Plan  · Continue antibiotics as outlined above    Seizure disorder ([facility] [address] )  Assessment & Plan  · Continue home Lamictal    Diabetes [facility])  Assessment & Plan  Lab Results   Component Value Date    HGBA1C 6 2 (H) 01/21/2021 Recent Labs     21  1602 21  2123 21  0722 21  1121   POCGLU 142* 186* 146* 191*       Blood Sugar Average: Last 72 hrs:  (P) 164   · Hold home p o  Agents  · Sliding scale insulin, CCO diet  · Monitor blood sugars    Hyperlipidemia  Assessment & Plan  · Continue statin therapy      VTE Pharmacologic Prophylaxis:   Pharmacologic: Heparin  Mechanical VTE Prophylaxis in Place: Yes    Patient Centered Rounds: I have performed bedside rounds with nursing staff today  Discussions with Specialists or Other Care Team Provider:  Discussed with PT/OT/case management    Education and Discussions with Family / Patient:  Patient herself    Time Spent for Care: 30 minutes  More than 50% of total time spent on counseling and coordination of care as described above  Current Length of Stay: 7 day(s)    Current Patient Status: Inpatient   Certification Statement: The patient will continue to require additional inpatient hospital stay due to Need for rehab placement    Discharge Plan:  Once stable    Code Status: Level 1 - Full Code      Subjective:     Patient evaluated this morning  Currently afebrile, denies any nausea vomiting diarrhea constipation  Doing well after surgery  No other events reported  Objective:     Vitals:   Temp (24hrs), Av 4 °F (36 9 °C), Min:98 3 °F (36 8 °C), Max:98 5 °F (36 9 °C)    Temp:  [98 3 °F (36 8 °C)-98 5 °F (36 9 °C)] 98 5 °F (36 9 °C)  HR:  [62-81] 74  Resp:  [17-18] 17  BP: ()/(55-81) 96/55  SpO2:  [92 %-94 %] 94 %  Body mass index is 42 19 kg/m²  Input and Output Summary (last 24 hours): Intake/Output Summary (Last 24 hours) at 3/17/2021 1424  Last data filed at 3/17/2021 1413  Gross per 24 hour   Intake 3330 83 ml   Output 1800 ml   Net 1530 83 ml       Physical Exam:     Physical Exam  Vitals signs and nursing note reviewed  Constitutional:       Appearance: Normal appearance  She is normal weight        Comments: Young female in bed, awake   HENT:      Head: Normocephalic and atraumatic  Right Ear: External ear normal       Left Ear: External ear normal       Nose: Nose normal  No congestion  Mouth/Throat:      Mouth: Mucous membranes are moist       Pharynx: Oropharynx is clear  No oropharyngeal exudate or posterior oropharyngeal erythema  Eyes:      General: No scleral icterus  Right eye: No discharge  Left eye: No discharge  Extraocular Movements: Extraocular movements intact  Conjunctiva/sclera: Conjunctivae normal       Pupils: Pupils are equal, round, and reactive to light  Neck:      Musculoskeletal: Normal range of motion and neck supple  No neck rigidity or muscular tenderness  Cardiovascular:      Rate and Rhythm: Normal rate and regular rhythm  Pulses: Normal pulses  Heart sounds: Normal heart sounds  No murmur  No friction rub  No gallop  Pulmonary:      Effort: Pulmonary effort is normal  No respiratory distress  Breath sounds: Normal breath sounds  No stridor  No wheezing, rhonchi or rales  Chest:      Chest wall: No tenderness  Abdominal:      General: Abdomen is flat  Bowel sounds are normal  There is no distension  Palpations: Abdomen is soft  There is no mass  Tenderness: There is no abdominal tenderness  There is no guarding or rebound  Musculoskeletal: Normal range of motion  General: No swelling, tenderness, deformity or signs of injury  Comments: Status post amputation right great toe  Currently dressed  Skin:     General: Skin is warm and dry  Capillary Refill: Capillary refill takes less than 2 seconds  Coloration: Skin is not jaundiced or pale  Findings: No bruising, erythema, lesion or rash  Neurological:      General: No focal deficit present  Mental Status: She is alert and oriented to person, place, and time  Mental status is at baseline  Cranial Nerves: No cranial nerve deficit        Sensory: No sensory deficit  Motor: No weakness  Coordination: Coordination normal    Psychiatric:         Mood and Affect: Mood normal          Behavior: Behavior normal          Thought Content: Thought content normal          Judgment: Judgment normal            Additional Data:     Labs:    Results from last 7 days   Lab Units 03/17/21  0547  03/15/21  0530   WBC Thousand/uL 6 92   < > 7 50   HEMOGLOBIN g/dL 11 3*   < > 11 6   HEMATOCRIT % 37 2   < > 38 1   PLATELETS Thousands/uL 259   < > 295   BANDS PCT %  --   --  1   NEUTROS PCT % 61  --   --    LYMPHS PCT % 25  --   --    LYMPHO PCT %  --   --  29   MONOS PCT % 10  --   --    MONO PCT %  --   --  9   EOS PCT % 1  --  2    < > = values in this interval not displayed  Results from last 7 days   Lab Units 03/17/21  0547  03/10/21  2015   SODIUM mmol/L 139   < > 138   POTASSIUM mmol/L 3 7   < > 3 6   CHLORIDE mmol/L 107   < > 101   CO2 mmol/L 21   < > 23   BUN mg/dL 7   < > 9   CREATININE mg/dL 0 72   < > 0 79   ANION GAP mmol/L 11   < > 14*   CALCIUM mg/dL 8 9   < > 8 7   ALBUMIN g/dL  --   --  2 9*   TOTAL BILIRUBIN mg/dL  --   --  0 68   ALK PHOS U/L  --   --  97   ALT U/L  --   --  21   AST U/L  --   --  15   GLUCOSE RANDOM mg/dL 156*   < > 167*    < > = values in this interval not displayed  Results from last 7 days   Lab Units 03/10/21  2014   INR  1 02     Results from last 7 days   Lab Units 03/17/21  1121 03/17/21  0722 03/16/21  2123 03/16/21  1602 03/16/21  1119 03/16/21  1055 03/16/21  0734 03/15/21  2111 03/15/21  1704 03/15/21  1043 03/15/21  0735 03/14/21  2051   POC GLUCOSE mg/dl 191* 146* 186* 142* 128 128 146* 191* 172* 155* 161* 159*         Results from last 7 days   Lab Units 03/11/21  0525 03/10/21  2015 03/10/21  2014   LACTIC ACID mmol/L  --  1 9  --    PROCALCITONIN ng/ml <0 05  --  <0 05           * I Have Reviewed All Lab Data Listed Above  * Additional Pertinent Lab Tests Reviewed:  Dylan Yee Reviewed      Recent Cultures (last 7 days):     Results from last 7 days   Lab Units 03/10/21  2258 03/10/21  2048 03/10/21  2014   BLOOD CULTURE   --  No Growth After 5 Days  No Growth After 5 Days  GRAM STAIN RESULT  No Polys or Bacteria seen  --   --    WOUND CULTURE  1+ Growth of Methicillin Resistant Staphylococcus aureus*  1+ Growth of Beta Hemolytic Streptococcus Group G*  --   --        Last 24 Hours Medication List:   Current Facility-Administered Medications   Medication Dose Route Frequency Provider Last Rate    acetaminophen  650 mg Oral Q6H PRN [facility], DPM      atorvastatin  20 mg Oral Daily With [facility], DPM      ceftaroline ELLIS [facility]) IV  600 mg Intravenous Q12H [facility],  mg (03/17/21 1156)    gabapentin  300 mg Oral Daily [facility], DPM      heparin (porcine)  5,000 Units Subcutaneous Q8H [address] [place], [place]      HYDROmorphone  0 5 mg Intravenous Q4H PRN [facility], DPM      insulin lispro  1-5 Units Subcutaneous [facility], DPM      insulin lispro  1-6 Units Subcutaneous TID [facility], DPM      lactated ringers  125 mL/hr Intravenous Continuous [facility],  mL/hr (03/17/21 1413)    lamoTRIgine  100 mg Oral Daily [facility], DPM      ondansetron  4 mg [facility]venous [facility]H [facility]      oxybutynin  10 mg Oral Daily [facility], [place]      oxyCODONE  10 mg Oral Q4H PRN [facility], DPM      oxyCODONE  5 mg Oral Q4H PRN [facility], DPM      topiramate  200 mg Oral [facility], DPM          Today, Patient Was Seen By: Iker Rowland MD    ** Please Note: Dictation voice to text software may have been used in the creation of this document   **

## 2021-03-17 NOTE — DISCHARGE INSTRUCTIONS
Please follow-up with your primary care provider within 1 week  Please follow-up with podiatry  Continue antibiotics until completion  Continue nonweightbearing to the right lower extremity  If you begin to have any worsening pain, fevers or chills please come back to the hospital for further evaluation  Osteomyelitis   WHAT YOU NEED TO KNOW:   Osteomyelitis is a severe bone infection  It can develop in any bone, but often involves the long bones, such as your arm and leg bones, or the bones of the spine  Osteomyelitis is caused by different types of germs, such as bacteria or a fungus  DISCHARGE INSTRUCTIONS:   Call your local emergency number (911 in the [address] [place] [address],[address]) if:   · You have sudden trouble breathing  Seek care immediately if:   · You have severe pain  · Your bone breaks  Call your doctor if:   · Your symptoms return  · You have increased swelling, pain, or redness of your infected area  · You have new drainage or an odor from your wound  · You have questions or concerns about your condition or care  Medicines: You may need any of the following:  · Prescription pain medicine  may be given  Ask your healthcare provider how to take this medicine safely  Some prescription pain medicines contain acetaminophen  Do not take other medicines that contain acetaminophen without talking to your healthcare provider  Too much acetaminophen may cause liver damage  Prescription pain medicine may cause constipation  Ask your healthcare provider how to prevent or treat constipation  · Antibiotics  help treat or prevent a bacterial infection  · Antifungals  help treat or prevent a fungal infection  · Acetaminophen  decreases pain and fever  It is available without a doctor's order  Ask how much to take and how often to take it  Follow directions   Read the labels of all other medicines you are using to see if they also contain acetaminophen, or ask your doctor or pharmacist  Acetaminophen can cause liver damage if not taken correctly  Do not use more than 4 grams (4,000 milligrams) total of acetaminophen in one day  · Take your medicine as directed  Contact your healthcare provider if you think your medicine is not helping or if you have side effects  Tell him or her if you are allergic to any medicine  Keep a list of the medicines, vitamins, and herbs you take  Include the amounts, and when and why you take them  Bring the list or the pill bottles to follow-up visits  Carry your medicine list with you in case of an emergency  Rest and immobilize: You may need to rest and wear a splint to help your bone heal  A splint will prevent your bone from moving  Keep weight off of your leg by using crutches, a cane, or walker as directed  Ask your healthcare provider for more information about splints and when you can return to your normal activities  Eat a variety of healthy foods:  Healthy foods include fruits, vegetables, whole-grain breads, low-fat dairy products, beans, lean meats, and fish  Healthy foods will help you heal  Ask if you need to be on a special diet  Do not smoke:  Nicotine and other chemicals in cigarettes and cigars can cause lung damage and prevent healing  Ask your healthcare provider for information if you currently smoke and need help to quit  E-cigarettes or smokeless tobacco still contain nicotine  Talk to your healthcare provider before you use these products  If you smoke, it is never too late to quit  Ask your healthcare provider for information if you need help quitting  Control other medical conditions:  Control other medical conditions, such as diabetes, to prevent more bone damage  It is hard to get rid of an infection if your blood sugars are high  Wound care:  Care for your wound as directed  Carefully wash the wound with soap and water  Dry the area and put on new, clean bandages as directed  Change your bandages when they get wet or dirty    Follow up with your doctor as directed: You may need to return for more blood tests or x-rays  Write down your questions so you remember to ask them during your visits  © Copyright [address] Information is for End User's use only and may not be sold, redistributed or otherwise used for commercial purposes  All illustrations and images included in CareNotes® are the copyrighted property of [employer]  or [address] [place], [place] [address]hannah Adame   The above information is an  only  It is not intended as medical advice for individual conditions or treatments  Talk to your doctor, nurse or pharmacist before following any medical regimen to see if it is safe and effective for you

## 2021-03-17 NOTE — CASE MANAGEMENT
Call placed to [facility] and explained to them that patient will not be going home but to an acute rehab

## 2021-03-17 NOTE — PROGRESS NOTES
Progress Note - Infectious Disease   Lisa Shay 27 y o  female MRN: [id number]  Unit/Bed#: -01 Encounter: [id number]      Impression/Recommendations:  1   Right hallux chronic ulcer with cellulitis, abscess, osteomyelitis, septic arthritis, pathologic fracture   Wound cultures with MRSA, GGS, also recent anaerobes   Blood cultures negative   Clinically stable without sepsis  Status post right hallux amputation/disarticulation 3/16 with presumed surgical cure  Rec:  ? OK from ID for D/C on Duricef 500 mg PO Q12 and Bactrim DS 1 tab PO Q12 with plan to continue through 3/23 to complete 7 days total postoperative antibiotics  ? 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   Severe hives with infusion      The patient is stable from an ID standpoint  The above plan was discussed in detail with Dr Bud Douglas      Antibiotics:  Ceftaroline #7  POD #1    Subjective:  Patient seen on AM rounds  Denies fevers, chills, sweats, nausea, vomiting, or diarrhea  24 Hour Events:  No documented fevers, chills, sweats, nausea, vomiting, or diarrhea  Objective:  Vitals:  Temp:  [97 5 °F (36 4 °C)-98 5 °F (36 9 °C)] 98 5 °F (36 9 °C)  HR:  [51-85] 74  Resp:  [13-18] 17  BP: ()/(47-81) 96/55  SpO2:  [91 %-95 %] 94 %  Temp (24hrs), Av 2 °F (36 8 °C), Min:97 5 °F (36 4 °C), Max:98 5 °F (36 9 °C)  Current: Temperature: 98 5 °F (36 9 °C)    Physical Exam:   General:  No acute distress  Psychiatric:  Awake and alert  Pulmonary:  Normal respiratory excursion without accessory muscle use  Abdomen:  Soft, nontender  Extremities:  No edema  Skin:  No rashes    Lab Results:  I have personally reviewed pertinent labs    Results from last 7 days   Lab Units 21  0547 21  0529 03/15/21  0530  03/10/21  2015 POTASSIUM mmol/L 3 7 3 8 3 6   < > 3 6   CHLORIDE mmol/L 107 106 105   < > 101   CO2 mmol/L 21 20* 22   < > 23   BUN mg/dL 7 9 9   < > 9   CREATININE mg/dL 0 72 0 81 0 75   < > 0 79   EGFR ml/min/1 73sq m 113 98 107   < > 101   CALCIUM mg/dL 8 9 9 1 9 0   < > 8 7   AST U/L  --   --   --   --  15   ALT U/L  --   --   --   --  21   ALK PHOS U/L  --   --   --   --  97    < > = values in this interval not displayed  Results from last 7 days   Lab Units 03/17/21  0547 03/16/21  0529 03/15/21  0530   WBC Thousand/uL 6 92 6 37 7 50   HEMOGLOBIN g/dL 11 3* 11 9 11 6   PLATELETS Thousands/uL 259 294 295     Results from last 7 days   Lab Units 03/10/21  2258 03/10/21  2048 03/10/21  2014   BLOOD CULTURE   --  No Growth After 5 Days  No Growth After 5 Days  GRAM STAIN RESULT  No Polys or Bacteria seen  --   --    WOUND CULTURE  1+ Growth of Methicillin Resistant Staphylococcus aureus*  1+ Growth of Beta Hemolytic Streptococcus Group G*  --   --        Imaging Studies:   I have personally reviewed pertinent imaging study reports and images in PACS  EKG, Pathology, and Other Studies:   I have personally reviewed pertinent reports

## 2021-03-17 NOTE — PHYSICAL THERAPY NOTE
Physical Therapy Evaluation     Patient's Name: Amber Kaufman    Admitting Diagnosis  Osteomyelitis ([facility] ) [M86 9]  Visit for wound check [Z51 89]  Cellulitis of right toe [P48 327]    Problem List  Patient Active Problem List   Diagnosis    Hyperlipidemia    Diabetes ([facility] )    Seizure disorder ([facility] )    Diabetic foot infection ([facility] )    Cellulitis of toe    Sepsis ([facility] )    Other chest pain    Bipolar 1 disorder ([place] [address] )    Osteomyelitis ([place] [address] )    Morbid obesity due to excess calories [facility])     Past Medical History  Past Medical History:   Diagnosis Date    Anxiety     Bipolar disorder ([place] [address] )     Depression     Diabetes mellitus ([place] [address] )     Epilepsy ([place] [address] )     Psychiatric disorder      Past Surgical History  Past Surgical History:   Procedure Laterality Date    OTHER SURGICAL HISTORY      Patient states she has surgery on "ear tubes", and bone spur in R hand, and 2nd toe on left foot    TOE AMPUTATION Right 3/16/2021    Procedure: AMPUTATION TOE;  Surgeon: Dean Whatley DPM;  Location: [facility];  Service: Podiatry    TONSILLECTOMY        03/17/21 1110   PT Last Visit   PT Visit Date 03/17/21   Note Type   Note type Evaluation   Pain Assessment   Pain Assessment Tool 0-10   Pain Score No Pain   Home Living   Type of [address] [place] [address] Two level;Bed/bath upstairs;Stairs to enter with rails  (3 QUINTIN)   Bathroom Shower/Tub Tub/shower unit   Bathroom Toilet Standard   Bathroom Equipment Grab bars in shower; Shower chair   Bathroom Accessibility Accessible   Home Equipment Cane   Additional Comments Pt ambulates with a cane     Prior Function   Level of [place] Independent with ADLs and functional mobility   Lives With Friend(s)   Receives Help From Friend(s)   ADL Assistance Independent   IADLs Independent   Falls in the last 6 months 5 to 10   Vocational Unemployed   Restrictions/Precautions   Weight Bearing Precautions Per Order Yes   RLE Weight Bearing Per Order NWB  (per podiatry)   Braces or Orthoses Other (Comment)  (none per patient)   Other Precautions Chair Alarm; Bed Alarm;WBS;Multiple lines;Telemetry; Fall Risk;Contact/isolation; Seizure   General   Additional Pertinent History Per tiger text communication with Dr Jonathan Del Rio obtained post op shoe for left lower extremity for protection of wounds; pt with no weight bearing restrictions on left lower extremity  Family/Caregiver Present No   Cognition   Overall Cognitive Status WFL   Arousal/Participation Alert   Orientation Level Oriented X4   Memory Within functional limits   Following Commands Follows all commands and directions without difficulty   Comments Pt agreeable to PT    RLE Assessment   RLE Assessment X   Strength RLE   RLE Overall Strength 3+/5   LLE Assessment   LLE Assessment X   Strength LLE   LLE Overall Strength 3+/5   Light Touch   RLE Light Touch Impaired   RLE Light Touch Comments grossly intact; decreased   LLE Light Touch Impaired   LLE Light Touch Comments grossly intact; decreased   Transfers   Sit to Stand 4  Minimal assistance   Additional items Assist x 1; Increased time required;Verbal cues   Stand to Sit 4  Minimal assistance   Additional items Assist x 1; Increased time required;Verbal cues   Ambulation/Elevation   Gait pattern Excessively slow; Step to;Short stride; Inconsistent dylon; Shuffling;Decreased foot clearance; Improper Weight shift; Poor UE support   Gait Assistance 3  Moderate assist   Additional items Assist x 1;Verbal cues   Assistive Device Rolling walker   Distance 5 feet  (pt able to maintain R NWB, but only clear 1-2 inches of L LE)   Stair Management Assistance Not tested   Balance   Static Sitting Good   Dynamic Sitting Fair +   Static Standing Poor +   Dynamic Standing Poor   Ambulatory Poor   Endurance Deficit   Endurance Deficit Yes   Activity Tolerance   Activity Tolerance Patient limited by fatigue   Medical Staff Rebecca Comes Dr Jonathan Del Rio, CM [facility], PT Am[place]   Nurse Made Aware Discussed case with LENORA Sam; post session pt was left seated at EOB in NAD, all belongings within reach   Assessment   Prognosis Good   Problem List Decreased strength;Decreased endurance; Impaired balance;Decreased mobility; Impaired sensation;Decreased skin integrity;Orthopedic restrictions   Assessment Pt is 27year old female seen for PT evaluation s/p admit to [facility] on 3/10/2021 with Osteomyelitis ([address] )  PT consulted to assess pt's functional mobility and d/c needs  Order placed for PT eval and tx, with NWB R LE order  Comorbidities affecting pt's physical performance at time of assessment include hyperlipidemia, diabetes, seizure disorder, cellulitis of toe, morbid obesity due to excess calories, and bipolar 1 disorder  PTA, pt was independent with all functional mobility with a cane  Personal factors affecting pt at time of IE include inaccessible home environment, lives in two story house, ambulating with an assistive device, stairs to enter home, inability to ambulate household distances, inability to navigate community distances, inability to navigate level surfaces without external assistance, unable to perform dynamic tasks in community, and positive fall history  Please find objective findings from PT assessment regarding body systems outlined above with impairments and limitations including weakness, impaired balance, decreased endurance, gait deviations, decreased activity tolerance, decreased functional mobility tolerance, altered sensation, fall risk, podiatry restrictions, and decreased skin integrity  The following objective measures performed on IE also reveal limitations: Barthel Index: 55/100 and Modified Woodward: 4 (moderate/severe disability)  Pt's clinical presentation is currently unstable/unpredictable seen in pt's presentation of need for ongoing medical management/monitoring, pt is a fall risk, and pt requires cues/assist for safety with functional mobility   Pt to benefit from continued PT tx to address deficits as defined above and maximize level of functional independent mobility and consistency  From PT/mobility standpoint, recommendation at time of d/c would be STR pending progress in order to facilitate return to PLOF  Barriers to Discharge Inaccessible home environment;Decreased caregiver support   Goals   Patient Goals to return home   STG Expiration Date 03/27/21   Short Term Goal #1 In 7-10 days: Increase bilateral LE strength 1/2 grade to facilitate independent mobility, Perform all bed mobility tasks with modified independence to decrease caregiver burden, Perform all transfers modified independent to improve independence, Ambulate > 25 ft  with least restrictive assistive device modified independent w/o LOB and w/ normalized gait pattern 100% of the time, Increase all balance 1/2 grade to decrease risk for falls and PT to see and establish goals for stairs when appropriate   Plan   Treatment/Interventions Functional transfer training;LE strengthening/ROM; Elevations; Therapeutic exercise; Endurance training;Patient/family training;Bed mobility;Gait training;Spoke to MD;Spoke to nursing;Spoke to case management   PT Frequency Other (Comment)  (3-5x/wk)   Recommendation   PT Discharge Recommendation Post-Acute Rehabilitation Services   Equipment Recommended Walker   PT - OK to Discharge Yes  (when medically cleared, if to STR)   AM-PAC Basic Mobility Inpatient   Turning in Bed Without Bedrails 3   Lying on Back to Sitting on Edge of Flat Bed 3   Moving Bed to Chair 2   Standing Up From Chair 3   Walk in Room 2   Climb 3-5 Stairs 1   Basic Mobility Inpatient Raw Score 14   Basic Mobility Standardized Score 35 55   Modified Vilas Scale   Modified Vilas Scale 4   Barthel Index   Feeding 10   Bathing 0   Grooming Score 5   Dressing Score 5   Bladder Score 10   Bowels Score 10   Toilet Use Score 5   Transfers (Bed/Chair) Score 10   Mobility (Level Surface) Score 0   Stairs Score 0   Barthel Index Score 55     Evaline Canavan, PT, DPT    Physical Therapy Treatment Note    S: Pt seen for treatment session  O: Provided education on using axillary crutches for ambulation with correct technique  Pt attempted ambulation with axillary crutches; pt ambulated 2 feet with axillary crutches and maximum assist from therapist  Pt with significant forward flexed trunk and inability to clear left lower extremity  Pt with poor balance and with increased risk for falls  Pt educated on using walker for future functional mobility to ensure pt safety  Pt educated again on the importance of only ambulating short distances with the walker to protect the skin integrity on the left lower extremity  Pt educated on the importance of maintaining NWB on R LE with good carryover  Pt also educated on not attempting stairs due to decreased foot clearance during ambulation  A: Pt tolerated treatment well  Pt continues to present with decreased lower extremity strength, impaired balance, decreased endurance, gait deviations, and decreased functional mobility  Pt would continue to benefit from STR at time of d/c in order to maximize the pt's functional independence and safety with mobility  P: PT will continue to see pt while here in order to address the deficits listed above and provide interventions consistent with POC in effort to achieve STGs      Evaline Canavan, PT, DPT    Time of PT treatment session: [id number]  10 minutes

## 2021-03-17 NOTE — CASE MANAGEMENT
PT came to me today and said that it is an unsfe DC for pt to go home due to her inability to walk safely at this time and also she would need to do many steps in her home and she is unable to do steps at this time  referrals sent to acute rehabs

## 2021-03-17 NOTE — DISCHARGE INSTR - AVS FIRST PAGE
DISCHARGE INSTRUCTIONS:    It was our pleasure to care of you here at [facility]     Please note the following discharge instructions:    Notable Medication Adjustments -   Take cefadroxil 500 mg twice a day for 7 days  Take Bactrim DS twice a day for 7 days  Important follow up information -   Follow-up with podiatry regarding further care of your foot, bandages to remain intact to the right foot until f/u      Please review this entire after visit summary as additional general instructions including medication list, appointments, activity, diet, any pertinent wound care, and other additional recommendations from your care team that may be provided for you

## 2021-03-18 NOTE — DISCHARGE SUMMARY
[facility]  Discharge- Geovanny Mckeon 1990, 27 y o  female MRN: [id number]  Unit/Bed#: -Timothy Encounter: [id number]  Primary Care Provider: Patricia Wiseman PA-C     Date and time admitted to hospital: 3/10/2021  7:33 PM  Date of Discharge - 03/17/2021    Discharge diagnosis:    1  Right hallux chronic ulcer with cellulitis, abscess, osteomyelitis, septic arthritis, pathologic fracture, status post amputation by Podiatry  Wound cultures with MRSA, GGS, also recent anaerobes   Blood cultures negative  2  Chronic ulcers in contralateral feet  3  Diabetes mellitus  4  Bipolar disorder  5  History of hives with vancomycin infusion  6  Seizure disorder  7  Morbid obesity  8  Hyperlipidemia    Discharging Physician / Practitioner: Emilia Kothari MD  PCP: Patricia Wiseman PA-C  Admission Date:   Admission Orders (From admission, onward)     Ordered        03/10/21 2107  Inpatient Admission  Once                   Discharge Date: 03/18/21    Consultations During Hospital Stay:  · Infectious Diseases  · Podiatry    Procedures Performed:   · Right great toe amputation    Significant Findings / Test Results:   MRI - RIGHT FOOT WITH AND WITHOUT CONTRAST  INDICATION:   OM   COMPARISON:  Plain film dated 3/10/2021  TECHNIQUE:  MR sequences were obtained of the right foot including the following:  Precontrast sequences: localizers, sagittal STIR, sagittal T1, coronal T1, coronal T2 fat sat/STIR, axial T2 fat sat/STIR, axial PD, coronal T1 fat sat  Postcontrast   sequences: Coronal T1 fat sat, sagittal T1 fat sat  IV Contrast:  11 mL of Gadobutrol injection (SINGLE-DOSE)   FINDINGS:  SUBCUTANEOUS TISSUES: Plantar skin ulceration at the level of the 1st interphalangeal joint  BONES:  Extensive marrow replacement signal noted involving the 1st proximal and distal phalanges with increased T2 signal and post gadolinium enhancement    Findings are consistent with osteomyelitis including intervening septic arthritis  Pathologic fracture plantar aspect of the base of the 1st distal phalanx best seen on series 7 image 7 and corresponding to recent plain film findings  There appears to be draining sinus tract to the skin surface best seen on series 13 images 6 and 7  Along the dorsal aspect of the 1st interphalangeal joint there is a small collection suspicious for tiny abscess measuring 5 mm on 13/7  FIRST MTP JOINT:  Intact  SESAMOID BONES:  Intact  OTHER ARTICULAR SURFACES:  Normal   PLANTAR FASCIA:  Intact  LISFRANC LIGAMENT:  Intact  FOREFOOT TENDONS:  The flexor hallucis longus tendon appears torn with distal ends of the tendon noted at the proximal metadiaphysis of the 1st metatarsal level best seen on series 7 image 9  INTERMETATARSAL REGIONS:  No bursitis or Fatima's neuroma  MUSCULATURE: Fatty atrophic forefoot musculature consistent with diabetic history  IMPRESSION:  1   1st interphalangeal joint septic arthropathy with corresponding proximal/distal phalangeal osteomyelitis and distal phalanx pathologic fracture  Draining sinus tract extends to the plantar skin surface in a region of ulceration  There appears to be   a small dorsal collection at the level of the 1st interphalangeal joint measuring 5 mm concerning for small abscess  2   Complete tear of the flexor pollicis longus tendon with retraction to the proximal 1st metatarsal   This may be infection related (pathologic rupture)  The study was marked in [facility] for immediate notification  Workstation performed: [id number]       Outpatient Follow up Requested:  · Patient should follow-up with podiatry as an outpatient  Complications:  None    Reason for Admission:  Toe redness      HPI:  Linsey Gonzalez is a 27 y o  female with past medical history significant of seizure disorder, type 2 diabetes initially presented with right toe redness  Patient has a history of bilateral diabetic foot wounds    Reports increasing redness and drainage in pain of the toe  Denies fevers, chills  Was recently admitted 3 weeks ago with foot cellulitis  Denies abdominal pain, nausea, vomiting, diarrhea, constipation, dysuria, headaches or any other symptoms  Hospital Course:     Patient was hospitalized and treated with IV antibiotics  MRI revealed osteomyelitis of the right great toe so or podiatry was consulted, patient underwent amputation of the same  Infectious Disease was involved given MRSA and previous vancomycin allergy, patient was treated with ceftaroline while in the hospital   On 03/17/2021 patient feels much better, she is currently nonseptic  Infectious disease recommends a course of Duricef and Bactrim for 7 more days  Patient was evaluated by PT OT and recommended rehab however she did not want rehab  She wanted to go home by herself  She was discharged in stable condition in the afternoon of 03/17/2021  She should follow-up with podiatry and wound care center as an outpatient      Condition at Discharge: good     Discharge Day Visit / Exam:     Subjective:  Patient evaluated day of discharge, denies any new symptoms, currently nonseptic  Vitals: Blood Pressure: 96/54 (03/17/21 1518)  Pulse: 64 (03/17/21 1518)  Temperature: 97 8 °F (36 6 °C) (03/17/21 1518)  Temp Source: Temporal (03/16/21 0914)  Respirations: 18 (03/17/21 1518)  Height: 5' 5" (165 1 cm) (03/11/21 1000)  Weight - Scale: 115 kg (253 lb 8 5 oz) (03/10/21 2202)  SpO2: 94 % (03/17/21 1518)  Exam:   Physical Exam  Vitals signs and nursing note reviewed  Constitutional:       Appearance: Normal appearance  She is normal weight  Comments: Young female in bed, awake   HENT:      Head: Normocephalic and atraumatic  Right Ear: External ear normal       Left Ear: External ear normal       Nose: Nose normal  No congestion  Mouth/Throat:      Mouth: Mucous membranes are moist       Pharynx: Oropharynx is clear   No oropharyngeal exudate or posterior oropharyngeal erythema  Eyes:      General: No scleral icterus  Right eye: No discharge  Left eye: No discharge  Extraocular Movements: Extraocular movements intact  Conjunctiva/sclera: Conjunctivae normal       Pupils: Pupils are equal, round, and reactive to light  Neck:      Musculoskeletal: Normal range of motion and neck supple  No neck rigidity or muscular tenderness  Cardiovascular:      Rate and Rhythm: Normal rate and regular rhythm  Pulses: Normal pulses  Heart sounds: Normal heart sounds  No murmur  No friction rub  No gallop  Pulmonary:      Effort: Pulmonary effort is normal  No respiratory distress  Breath sounds: Normal breath sounds  No stridor  No wheezing, rhonchi or rales  Chest:      Chest wall: No tenderness  Abdominal:      General: Abdomen is flat  Bowel sounds are normal  There is no distension  Palpations: Abdomen is soft  There is no mass  Tenderness: There is no abdominal tenderness  There is no guarding or rebound  Musculoskeletal: Normal range of motion  General: No swelling, tenderness, deformity or signs of injury  Comments: Status post amputation right great toe  Currently dressed  Skin:     General: Skin is warm and dry  Capillary Refill: Capillary refill takes less than 2 seconds  Coloration: Skin is not jaundiced or pale  Findings: No bruising, erythema, lesion or rash  Neurological:      General: No focal deficit present  Mental Status: She is alert and oriented to person, place, and time  Mental status is at baseline  Cranial Nerves: No cranial nerve deficit  Sensory: No sensory deficit  Motor: No weakness  Coordination: Coordination normal    Psychiatric:         Mood and Affect: Mood normal          Behavior: Behavior normal          Thought Content:  Thought content normal          Judgment: Judgment normal          Discussion with Family:  Patient herself she is competent alert and oriented    Discharge instructions/Information to patient and family:   See after visit summary for information provided to patient and family  Provisions for Follow-Up Care:  See after visit summary for information related to follow-up care and any pertinent home health orders  Disposition:     Home   Patient was recommended rehab however she refused    For Discharges to [address] SN[address]:   · Not Applicable to this Patient - Not Applicable to this Patient    Planned Readmission: No     Discharge Statement:  I spent 45 minutes discharging the patient  This time was spent on the day of discharge  I had direct contact with the patient on the day of discharge  Greater than 50% of the total time was spent examining patient, answering all patient questions, arranging and discussing plan of care with patient as well as directly providing post-discharge instructions  Additional time then spent on discharge activities  Discharge Medications:  See after visit summary for reconciled discharge medications provided to patient and family        ** Please Note: This note has been constructed using a voice recognition system **

## 2021-07-02 ENCOUNTER — APPOINTMENT (EMERGENCY)
Dept: CT IMAGING | Facility: HOSPITAL | Age: 31
DRG: 638 | End: 2021-07-02
Payer: MEDICARE

## 2021-07-02 ENCOUNTER — APPOINTMENT (INPATIENT)
Dept: MRI IMAGING | Facility: HOSPITAL | Age: 31
DRG: 638 | End: 2021-07-02
Payer: MEDICARE

## 2021-07-02 ENCOUNTER — HOSPITAL ENCOUNTER (INPATIENT)
Facility: HOSPITAL | Age: 31
LOS: 1 days | Discharge: HOME | DRG: 638 | End: 2021-07-03
Attending: EMERGENCY MEDICINE | Admitting: STUDENT IN AN ORGANIZED HEALTH CARE EDUCATION/TRAINING PROGRAM
Payer: MEDICARE

## 2021-07-02 DIAGNOSIS — M86.9 OSTEITIS (HCC): ICD-10-CM

## 2021-07-02 DIAGNOSIS — E11.69 TYPE 2 DIABETES MELLITUS WITH OTHER SPECIFIED COMPLICATION, WITH LONG-TERM CURRENT USE OF INSULIN (HCC): ICD-10-CM

## 2021-07-02 DIAGNOSIS — E11.621 DIABETIC FOOT ULCER WITH OSTEOMYELITIS (HCC): Primary | ICD-10-CM

## 2021-07-02 DIAGNOSIS — L97.509 DIABETIC FOOT ULCER WITH OSTEOMYELITIS (HCC): Primary | ICD-10-CM

## 2021-07-02 DIAGNOSIS — A41.9 SEPSIS, DUE TO UNSPECIFIED ORGANISM, UNSPECIFIED WHETHER ACUTE ORGAN DYSFUNCTION PRESENT (HCC): ICD-10-CM

## 2021-07-02 DIAGNOSIS — E11.69 DIABETIC FOOT ULCER WITH OSTEOMYELITIS (HCC): Primary | ICD-10-CM

## 2021-07-02 DIAGNOSIS — Z79.4 TYPE 2 DIABETES MELLITUS WITH OTHER SPECIFIED COMPLICATION, WITH LONG-TERM CURRENT USE OF INSULIN (HCC): ICD-10-CM

## 2021-07-02 DIAGNOSIS — Z88.9 DRUG ALLERGY: ICD-10-CM

## 2021-07-02 DIAGNOSIS — M86.9 DIABETIC FOOT ULCER WITH OSTEOMYELITIS (HCC): Primary | ICD-10-CM

## 2021-07-02 PROBLEM — E87.6 HYPOKALEMIA: Status: ACTIVE | Noted: 2021-07-02

## 2021-07-02 LAB
ALBUMIN SERPL BCP-MCNC: 3.1 G/DL (ref 3.5–5)
ALP SERPL-CCNC: 102 U/L (ref 46–116)
ALT SERPL W P-5'-P-CCNC: 34 U/L (ref 12–78)
ANION GAP SERPL CALCULATED.3IONS-SCNC: 7 MMOL/L (ref 4–13)
AST SERPL W P-5'-P-CCNC: 19 U/L (ref 5–45)
BASOPHILS # BLD AUTO: 0.04 THOUSANDS/ΜL (ref 0–0.1)
BASOPHILS NFR BLD AUTO: 1 % (ref 0–1)
BILIRUB SERPL-MCNC: 0.34 MG/DL (ref 0.2–1)
BUN SERPL-MCNC: 8 MG/DL (ref 5–25)
CALCIUM ALBUM COR SERPL-MCNC: 9.2 MG/DL (ref 8.3–10.1)
CALCIUM SERPL-MCNC: 8.5 MG/DL (ref 8.3–10.1)
CHLORIDE SERPL-SCNC: 107 MMOL/L (ref 100–108)
CO2 SERPL-SCNC: 28 MMOL/L (ref 21–32)
CREAT SERPL-MCNC: 0.8 MG/DL (ref 0.6–1.3)
EOSINOPHIL # BLD AUTO: 0.15 THOUSAND/ΜL (ref 0–0.61)
EOSINOPHIL NFR BLD AUTO: 2 % (ref 0–6)
ERYTHROCYTE [DISTWIDTH] IN BLOOD BY AUTOMATED COUNT: 14.6 % (ref 11.6–15.1)
GFR SERPL CREATININE-BSD FRML MDRD: 99 ML/MIN/1.73SQ M
GLUCOSE SERPL-MCNC: 115 MG/DL (ref 65–140)
GLUCOSE SERPL-MCNC: 117 MG/DL (ref 65–140)
GLUCOSE SERPL-MCNC: 142 MG/DL (ref 65–140)
GLUCOSE SERPL-MCNC: 83 MG/DL (ref 65–140)
HCG SERPL QL: NEGATIVE
HCT VFR BLD AUTO: 39.1 % (ref 34.8–46.1)
HGB BLD-MCNC: 12 G/DL (ref 11.5–15.4)
IMM GRANULOCYTES # BLD AUTO: 0.03 THOUSAND/UL (ref 0–0.2)
IMM GRANULOCYTES NFR BLD AUTO: 0 % (ref 0–2)
LYMPHOCYTES # BLD AUTO: 2.92 THOUSANDS/ΜL (ref 0.6–4.47)
LYMPHOCYTES NFR BLD AUTO: 38 % (ref 14–44)
MCH RBC QN AUTO: 26.5 PG (ref 26.8–34.3)
MCHC RBC AUTO-ENTMCNC: 30.7 G/DL (ref 31.4–37.4)
MCV RBC AUTO: 86 FL (ref 82–98)
MONOCYTES # BLD AUTO: 0.65 THOUSAND/ΜL (ref 0.17–1.22)
MONOCYTES NFR BLD AUTO: 8 % (ref 4–12)
NEUTROPHILS # BLD AUTO: 3.99 THOUSANDS/ΜL (ref 1.85–7.62)
NEUTS SEG NFR BLD AUTO: 51 % (ref 43–75)
NRBC BLD AUTO-RTO: 0 /100 WBCS
PLATELET # BLD AUTO: 247 THOUSANDS/UL (ref 149–390)
PMV BLD AUTO: 9.7 FL (ref 8.9–12.7)
POTASSIUM SERPL-SCNC: 3.4 MMOL/L (ref 3.5–5.3)
PROT SERPL-MCNC: 6.8 G/DL (ref 6.4–8.2)
RBC # BLD AUTO: 4.53 MILLION/UL (ref 3.81–5.12)
SODIUM SERPL-SCNC: 142 MMOL/L (ref 136–145)
WBC # BLD AUTO: 7.78 THOUSAND/UL (ref 4.31–10.16)

## 2021-07-02 PROCEDURE — 99223 1ST HOSP IP/OBS HIGH 75: CPT | Performed by: INTERNAL MEDICINE

## 2021-07-02 PROCEDURE — RECHECK: Performed by: FAMILY MEDICINE

## 2021-07-02 PROCEDURE — 99285 EMERGENCY DEPT VISIT HI MDM: CPT | Performed by: PHYSICIAN ASSISTANT

## 2021-07-02 PROCEDURE — 99284 EMERGENCY DEPT VISIT MOD MDM: CPT

## 2021-07-02 PROCEDURE — 99223 1ST HOSP IP/OBS HIGH 75: CPT | Performed by: FAMILY MEDICINE

## 2021-07-02 PROCEDURE — G1004 CDSM NDSC: HCPCS

## 2021-07-02 PROCEDURE — 73701 CT LOWER EXTREMITY W/DYE: CPT

## 2021-07-02 PROCEDURE — 73720 MRI LWR EXTREMITY W/O&W/DYE: CPT

## 2021-07-02 PROCEDURE — 80053 COMPREHEN METABOLIC PANEL: CPT | Performed by: PHYSICIAN ASSISTANT

## 2021-07-02 PROCEDURE — 85025 COMPLETE CBC W/AUTO DIFF WBC: CPT | Performed by: PHYSICIAN ASSISTANT

## 2021-07-02 PROCEDURE — A9585 GADOBUTROL INJECTION: HCPCS | Performed by: PHYSICIAN ASSISTANT

## 2021-07-02 PROCEDURE — 82948 REAGENT STRIP/BLOOD GLUCOSE: CPT

## 2021-07-02 PROCEDURE — 84703 CHORIONIC GONADOTROPIN ASSAY: CPT | Performed by: PHYSICIAN ASSISTANT

## 2021-07-02 PROCEDURE — 36415 COLL VENOUS BLD VENIPUNCTURE: CPT | Performed by: PHYSICIAN ASSISTANT

## 2021-07-02 RX ORDER — LAMOTRIGINE 100 MG/1
100 TABLET ORAL DAILY
Status: DISCONTINUED | OUTPATIENT
Start: 2021-07-02 | End: 2021-07-03 | Stop reason: HOSPADM

## 2021-07-02 RX ORDER — ACETAMINOPHEN 325 MG/1
650 TABLET ORAL EVERY 6 HOURS PRN
Status: DISCONTINUED | OUTPATIENT
Start: 2021-07-02 | End: 2021-07-03 | Stop reason: HOSPADM

## 2021-07-02 RX ORDER — MAGNESIUM HYDROXIDE/ALUMINUM HYDROXICE/SIMETHICONE 120; 1200; 1200 MG/30ML; MG/30ML; MG/30ML
30 SUSPENSION ORAL EVERY 6 HOURS PRN
Status: DISCONTINUED | OUTPATIENT
Start: 2021-07-02 | End: 2021-07-03 | Stop reason: HOSPADM

## 2021-07-02 RX ORDER — SERTRALINE HYDROCHLORIDE 100 MG/1
100 TABLET, FILM COATED ORAL DAILY
COMMUNITY

## 2021-07-02 RX ORDER — SERTRALINE HYDROCHLORIDE 100 MG/1
100 TABLET, FILM COATED ORAL DAILY
Status: DISCONTINUED | OUTPATIENT
Start: 2021-07-02 | End: 2021-07-02

## 2021-07-02 RX ORDER — GABAPENTIN 300 MG/1
600 CAPSULE ORAL 3 TIMES DAILY
Status: DISCONTINUED | OUTPATIENT
Start: 2021-07-02 | End: 2021-07-03 | Stop reason: HOSPADM

## 2021-07-02 RX ORDER — ATORVASTATIN CALCIUM 20 MG/1
20 TABLET, FILM COATED ORAL
Status: DISCONTINUED | OUTPATIENT
Start: 2021-07-02 | End: 2021-07-03 | Stop reason: HOSPADM

## 2021-07-02 RX ORDER — METRONIDAZOLE 500 MG/1
500 TABLET ORAL EVERY 8 HOURS SCHEDULED
Status: DISCONTINUED | OUTPATIENT
Start: 2021-07-02 | End: 2021-07-03 | Stop reason: HOSPADM

## 2021-07-02 RX ORDER — ONDANSETRON 2 MG/ML
4 INJECTION INTRAMUSCULAR; INTRAVENOUS EVERY 6 HOURS PRN
Status: DISCONTINUED | OUTPATIENT
Start: 2021-07-02 | End: 2021-07-03 | Stop reason: HOSPADM

## 2021-07-02 RX ORDER — OXYBUTYNIN CHLORIDE 5 MG/1
10 TABLET, EXTENDED RELEASE ORAL DAILY
Status: DISCONTINUED | OUTPATIENT
Start: 2021-07-02 | End: 2021-07-03 | Stop reason: HOSPADM

## 2021-07-02 RX ORDER — POTASSIUM CHLORIDE 20 MEQ/1
40 TABLET, EXTENDED RELEASE ORAL ONCE
Status: COMPLETED | OUTPATIENT
Start: 2021-07-02 | End: 2021-07-02

## 2021-07-02 RX ORDER — OXYCODONE HYDROCHLORIDE 5 MG/1
2.5 TABLET ORAL EVERY 4 HOURS PRN
Status: DISCONTINUED | OUTPATIENT
Start: 2021-07-02 | End: 2021-07-03 | Stop reason: HOSPADM

## 2021-07-02 RX ORDER — TOPIRAMATE 100 MG/1
200 TABLET, FILM COATED ORAL
Status: DISCONTINUED | OUTPATIENT
Start: 2021-07-02 | End: 2021-07-03 | Stop reason: HOSPADM

## 2021-07-02 RX ADMIN — GABAPENTIN 600 MG: 300 CAPSULE ORAL at 17:39

## 2021-07-02 RX ADMIN — NICOTINE 1 PATCH: 7 PATCH, EXTENDED RELEASE TRANSDERMAL at 09:06

## 2021-07-02 RX ADMIN — TOPIRAMATE 200 MG: 100 TABLET, FILM COATED ORAL at 22:13

## 2021-07-02 RX ADMIN — METRONIDAZOLE 500 MG: 500 INJECTION, SOLUTION INTRAVENOUS at 13:07

## 2021-07-02 RX ADMIN — ACETAMINOPHEN 650 MG: 325 TABLET, FILM COATED ORAL at 22:14

## 2021-07-02 RX ADMIN — OXYBUTYNIN CHLORIDE 10 MG: 5 TABLET, EXTENDED RELEASE ORAL at 09:05

## 2021-07-02 RX ADMIN — GABAPENTIN 600 MG: 300 CAPSULE ORAL at 09:05

## 2021-07-02 RX ADMIN — METRONIDAZOLE 500 MG: 500 TABLET, FILM COATED ORAL at 22:13

## 2021-07-02 RX ADMIN — METRONIDAZOLE 500 MG: 500 INJECTION, SOLUTION INTRAVENOUS at 05:24

## 2021-07-02 RX ADMIN — GABAPENTIN 600 MG: 300 CAPSULE ORAL at 22:12

## 2021-07-02 RX ADMIN — LAMOTRIGINE 100 MG: 100 TABLET ORAL at 09:05

## 2021-07-02 RX ADMIN — CEFTAROLINE FOSAMIL 600 MG: 600 POWDER, FOR SOLUTION INTRAVENOUS at 17:39

## 2021-07-02 RX ADMIN — CEFTAROLINE FOSAMIL 600 MG: 600 POWDER, FOR SOLUTION INTRAVENOUS at 06:10

## 2021-07-02 RX ADMIN — IOHEXOL 100 ML: 350 INJECTION, SOLUTION INTRAVENOUS at 01:43

## 2021-07-02 RX ADMIN — POTASSIUM CHLORIDE 40 MEQ: 1500 TABLET, EXTENDED RELEASE ORAL at 05:23

## 2021-07-02 RX ADMIN — ATORVASTATIN CALCIUM 20 MG: 20 TABLET, FILM COATED ORAL at 17:39

## 2021-07-02 RX ADMIN — GADOBUTROL 11 ML: 604.72 INJECTION INTRAVENOUS at 16:07

## 2021-07-02 RX ADMIN — SERTRALINE HYDROCHLORIDE 50 MG: 50 TABLET ORAL at 22:13

## 2021-07-02 NOTE — CONSULTS
Consultation - Mg Li 27 y o  female MRN: [id number]    Unit/Bed#: -01 Encounter: [id number]      Assessment/Plan     Assessment:  1) Left great toe ulceration to fat  2) Possible osteomyelitis of the left great toe  3) DM    Plan:  1) Await MRI left foot   2) IV antibiotics as per Medicine and ID  3) If positive for osteomyelitis left great toe will likely need an amputation  4) Will follow    History of Present Illness     HPI: Mg Li is a 27y o  year old female who presents with ulcer to the left foot great toe plantarly  Patient states that she was following with another outside Podiatrist since her last surgery  She did not follow with us after surgery  This is the first time since surgery that we have seen her  Patient states that now the left great toe was "making her sick" so she came to the ER for treatment        Consults    Review of Systems   No n/v/c/f/s  No GERD  No dysuria  No hallucinations  No bruising  No rashes  No headache  No sore throat  No earache  No diplopia  No CP  No SOB      Historical Information   Past Medical History:   Diagnosis Date    Anxiety     Bipolar disorder ([facility] )     Depression     Diabetes mellitus ([facility] )     Epilepsy ([facility] )     Psychiatric disorder      Past Surgical History:   Procedure Laterality Date    OTHER SURGICAL HISTORY      Patient states she has surgery on "ear tubes", and bone spur in R hand, and 2nd toe on left foot    TOE AMPUTATION Right 3/16/2021    Procedure: AMPUTATION TOE;  Surgeon: Miranda Barry DPM;  Location: [facility];  Service: Podiatry    TONSILLECTOMY       Social History   Social History     Substance and Sexual Activity   Alcohol Use Never     Social History     Substance and Sexual Activity   Drug Use Never     Social History     Tobacco Use   Smoking Status Current Some Day Smoker    Packs/day: 0 50    Types: Cigarettes    Last attempt to quit: 2020    Years since quittin 5   Smokeless Tobacco Never Used   Tobacco Comment    Pt reports smoking aprox 1 pk of cigarettes daily     E-Cigarette/Vaping    E-Cigarette Use Never User      E-Cigarette/Vaping Substances    Nicotine No       Family History: History reviewed  No pertinent family history  Meds/Allergies   all current active meds have been reviewed, current meds:   Current Facility-Administered Medications   Medication Dose Route Frequency    acetaminophen (TYLENOL) tablet 650 mg  650 mg Oral Q6H PRN    aluminum-magnesium hydroxide-simethicone (MYLANTA) oral suspension 30 mL  30 mL Oral Q6H PRN    atorvastatin (LIPITOR) tablet 20 mg  20 mg Oral Daily With Dinner    ceftaroline (TEFLARO) 600 mg in sodium chloride 0 9 % 50 mL IVPB  600 mg Intravenous Q12H    gabapentin (NEURONTIN) capsule 600 mg  600 mg Oral TID    insulin lispro (HumaLOG) 100 units/mL subcutaneous injection 1-5 Units  1-5 Units Subcutaneous Q6H [facility]    lamoTRIgine (LaMICtal) tablet 100 mg  100 mg Oral Daily    metroNIDAZOLE (FLAGYL) tablet 500 mg  500 mg Oral Q8H [facility]    nicotine (NICODERM CQ) 7 mg/24hr TD 24 hr patch 1 patch  1 patch Transdermal Daily    ondansetron (ZOFRAN) injection 4 mg  4 mg Intravenous Q6H PRN    oxybutynin (DITROPAN-XL) 24 hr tablet 10 mg  10 mg Oral Daily    oxyCODONE (ROXICODONE) IR tablet 2 5 mg  2 5 mg Oral Q4H PRN    sertraline (ZOLOFT) tablet 100 mg  100 mg Oral Daily    topiramate (TOPAMAX) tablet 200 mg  200 mg Oral HS    and PTA meds:   Prior to Admission Medications   Prescriptions Last Dose Informant Patient Reported? Taking?    Dulaglutide (Trulicity) 1 5 XB/0 6AL SOPN Past Week at Unknown time Self Yes Yes   Sig: Inject 1 5 mg under the skin once a week   Empagliflozin 10 MG TABS 7/1/2021 at Unknown time  Yes Yes   Sig: Take 10 mg by mouth   Mirabegron ER 50 MG TB24 7/1/2021 at Unknown time  Yes Yes   Sig: Take 50 mg by mouth   atorvastatin (LIPITOR) 20 mg tablet 7/1/2021 at Unknown time  Yes Yes   Sig: Take 20 mg by mouth gabapentin (NEURONTIN) 300 mg capsule 7/1/2021 at Unknown time Self Yes Yes   Sig: Take 600 mg by mouth 3 (three) times a day    glipiZIDE (GLUCOTROL XL) 5 mg 24 hr tablet 7/1/2021 at Unknown time  Yes Yes   Sig: Take 5 mg by mouth daily   lamoTRIgine (LaMICtal) 100 mg tablet 7/1/2021 at Unknown time  Yes Yes   Sig: Take 100 mg by mouth   metFORMIN (GLUCOPHAGE) 1000 MG tablet 7/1/2021 at Unknown time  Yes Yes   Sig: Take 1,000 mg by mouth 2 (two) times a day with meals   naproxen (NAPROSYN) 500 mg tablet 7/1/2021 at Unknown time  Yes Yes   Sig: Take 500 mg by mouth 2 (two) times a day with meals   sertraline (ZOLOFT) 100 mg tablet 7/1/2021 at Unknown time Self Yes Yes   Sig: Take 100 mg by mouth daily   topiramate (TOPAMAX) 100 mg tablet 7/1/2021 at Unknown time  Yes Yes   Sig: Take 100 mg by mouth Takes 2 tabs at bed time   ziprasidone (GEODON) 60 mg capsule Not Taking at Unknown time  Yes No   Sig: Take 60 mg by mouth   Patient not taking: Reported on 7/2/2021      Facility-Administered Medications: None     Allergies   Allergen Reactions    Amoxicillin Itching    Macrobid [Nitrofurantoin] Itching    Vancomycin Rash       Objective   Vitals: Blood pressure 102/62, pulse 80, temperature 97 5 °F (36 4 °C), resp  rate 18, height 5' 5" (1 651 m), weight 115 kg (254 lb 3 1 oz), last menstrual period 06/27/2021, SpO2 94 %    No intake or output data in the 24 hours ending 07/02/21 1544  Invasive Devices     Peripheral Intravenous Line            Peripheral IV 07/02/21 Right Antecubital <1 day                Physical Exam   Patient is awake alert and oriented x 3    Vascular: Pulses are palpable, skin is of normal turgor, CRT WNL, pedal hair is present  Orthopedic: Contractures to lesser toes, previous amputation of the right great toe  Neurological: Sensation is absent, no POP, no babinski  Dermatological: open lesion to the plantar first toe 0 5 cm x 0 6 cm x 0 3 cm, no purulence, no fluctuence, no probing, no tracking, erythema to the MPJ    IMPRESSION:     Increased sclerosis of the distal portion of the 1st digit with overlying soft tissue swelling which may represent underlying osteomyelitis  Further evaluation with a bone scan or MRI is recommended for confirmation      Small fluid collection overlying the 2nd digit

## 2021-07-02 NOTE — ASSESSMENT & PLAN NOTE
Lab Results   Component Value Date    HGBA1C 8 4 (H) 05/03/2021       Recent Labs     07/02/21  0741   POCGLU 115       Blood Sugar Average: Last 72 hrs:  · (P) 115   · Currently stable  · hold all p o   Diabetic medications  · Blood glucose checks and sliding scale insulin q 6 hours while NPO, hypoglycemia protocol

## 2021-07-02 NOTE — CONSULTS
of distal portion of 1st digit with overlying soft tissue swelling which may represent osteomyelitis, as well as small fluid collection overlying 2nd digit  Patient was started empirically on ceftaroline and Flagyl due to prior vancomycin reaction  REVIEW OF SYSTEMS:  A complete system-based review of systems is otherwise negative  PAST MEDICAL HISTORY:  Past Medical History:   Diagnosis Date    Anxiety     [facility]     Depression     [facility]     [facility]ile[facility] ([facility] )     Psychiatric disorder      Past Surgical History:   Procedure Laterality Date    OTHER SURGICAL HISTORY      Patient states she has surgery on "ear tubes", and bone spur in R hand, and 2nd toe on left foot    TOE AMPUTATION Right 3/16/2021    Procedure: AMPUTATION TOE;  [facility];  [facility];  Service: Podiatry    TONSILLECTOMY         FAMILY HISTORY:  Non-contributory    SOCIAL HISTORY:  Social History     Substance and Sexual Activity   Alcohol Use Never     Social History     Substance and Sexual Activity   Drug Use Never     Social History     Tobacco Use   Smoking Status Current Some Day Smoker    Packs/day: 0 50    Types: Cigarettes    Last attempt to quit: 2020    Years since quittin 5   Smokeless Tobacco Never Used   Tobacco Comment    Pt reports smoking aprox 1 pk of cigarettes daily       ALLERGIES:  Allergies   Allergen Reactions    Amoxicillin Itching    Macrobid [Nitrofurantoin] Itching    Vancomycin Rash       MEDICATIONS:  All current active medications have been reviewed      PHYSICAL EXAM:  Vitals:  Temp:  [97 5 °F (36 4 °C)-97 8 °F (36 6 °C)] 97 5 °F (36 4 °C)  HR:  [80-84] 80  Resp:  [18] 18  BP: (102-118)/(62-96) 102/62  SpO2:  [92 %-96 %] 94 %  Temp (24hrs), Av 6 °F (36 4 °C), Min:97 5 °F (36 4 °C), Max:97 8 °F (36 6 °C)  Current: Temperature: 97 5 °F (36 4 °C)     Physical Exam:  General:  Well-nourished, well-developed, in no acute distress, nontoxic  Eyes:  Conjunctive clear with no hemorrhages or effusions  Oropharynx:  No ulcers, no lesions  Neck:  Supple, no lymphadenopathy  Lungs:  Nonlabored respirations  Abdomen:  Soft, non-tender, non-distended  Extremities:  No peripheral cyanosis, clubbing, or edema  Skin:  Left foot plantar ulceration with surrounding erythema  No purulence  Neurological:  Moves all four extremities spontaneously    LABS, IMAGING, & OTHER STUDIES:  Lab Results:  I have personally reviewed pertinent labs  Results from last 7 days   Lab Units 07/02/21  0201   POTASSIUM mmol/L 3 4*   CHLORIDE mmol/L 107   CO2 mmol/L 28   BUN mg/dL 8   CREATININE mg/dL 0 80   EGFR ml/min/1 73sq m 99   CALCIUM mg/dL 8 5   AST U/L 19   ALT U/L 34   ALK PHOS U/L 102     Results from last 7 days   Lab Units 07/02/21  0201   WBC Thousand/uL 7 78   HEMOGLOBIN g/dL 12 0   PLATELETS Thousands/uL 247       Imaging Studies:   I have personally reviewed pertinent imaging study reports and images in PACS  CT left lower extremity shows increased sclerosis of distal portion of 1st digit with overlying soft tissue swelling which may represent underlying osteomyelitis  Small fluid collection overlying 2nd digit  EKG, Pathology, and Other Studies:   I have personally reviewed pertinent reports

## 2021-07-02 NOTE — ASSESSMENT & PLAN NOTE
Morbid obesity, in the setting of diabetes, as evidenced by BMI 42 30, being assisted with mobility as needed and monitoring of blood sugars and provide with healthy diet when able to eat

## 2021-07-02 NOTE — ED PROVIDER NOTES
History  Chief Complaint   Patient presents with    Leg Pain     pain from the foot to the knee on the left leg     Patient is a 70-year-old female with a past medical history significant for diabetes, anxiety, depression, bipolar disorder who presents to the emergency department for evaluation of a wound to her left great toe that began in December of last year  Patient states that this wound has been present since December and has not been healing  She has followed up with Wound Care  She has been taking clindamycin and ciprofloxacin without relief  Patient states that she came to the emergency department today because she developed pain from her toe that radiates up her left lower extremity  She is not having any fevers or chills  She denies any chest pain, difficulty breathing, abdominal pain, nausea, vomiting, diarrhea  Patient denies all other symptoms at this time  Prior to Admission Medications   Prescriptions Last Dose Informant Patient Reported? Taking?    Dulaglutide (Trulicity) 1 5 ZA/2 1QT SOPN  Self Yes No   Sig: Inject 1 5 mg under the skin once a week   Empagliflozin 10 MG TABS   Yes No   Sig: Take 10 mg by mouth   Mirabegron ER 50 MG TB24   Yes No   Sig: Take 50 mg by mouth   atorvastatin (LIPITOR) 20 mg tablet   Yes No   Sig: Take 20 mg by mouth   gabapentin (NEURONTIN) 300 mg capsule   Yes No   Sig: Take 300 mg by mouth   glipiZIDE (GLUCOTROL XL) 5 mg 24 hr tablet   Yes No   Sig: Take 5 mg by mouth daily   lamoTRIgine (LaMICtal) 100 mg tablet   Yes No   Sig: Take 100 mg by mouth   metFORMIN (GLUCOPHAGE) 1000 MG tablet   Yes No   Sig: Take 1,000 mg by mouth 2 (two) times a day with meals   naproxen (NAPROSYN) 500 mg tablet   Yes No   Sig: Take 500 mg by mouth 2 (two) times a day with meals   topiramate (TOPAMAX) 100 mg tablet   Yes No   Sig: Take 100 mg by mouth Takes 2 tabs at bed time   ziprasidone (GEODON) 60 mg capsule   Yes No   Sig: Take 60 mg by mouth Facility-Administered Medications: None       Past Medical History:   Diagnosis Date    Anxiety     Bipolar disorder ([facility] )     Depression     Diabetes mellitus ([facility] )     Epilepsy ([facility] )     Psychiatric disorder        Past Surgical History:   Procedure Laterality Date    OTHER SURGICAL HISTORY      Patient states she has surgery on "ear tubes", and bone spur in R hand, and 2nd toe on left foot    TOE AMPUTATION Right 3/16/2021    Procedure: AMPUTATION TOE;  Surgeon: Miranda Barry DPM;  Location: [facility];  Service: Podiatry    TONSILLECTOMY         History reviewed  No pertinent family history  I have reviewed and agree with the history as documented  E-Cigarette/Vaping    E-Cigarette Use Never User      E-Cigarette/Vaping Substances    Nicotine No      Social History     Tobacco Use    Smoking status: Current Some Day Smoker     Packs/day: 0 50     Last attempt to quit: 2020     Years since quittin 5    Smokeless tobacco: Never Used    Tobacco comment: Pt reports smoking aprox 1 pk of cigarettes daily   Vaping Use    Vaping Use: Never used   Substance Use Topics    Alcohol use: Never    Drug use: Never       Review of Systems   Constitutional: Negative for chills, diaphoresis and fever  HENT: Negative for congestion, facial swelling, nosebleeds, sore throat and voice change  Eyes: Negative for pain and redness  Respiratory: Negative for cough, choking, chest tightness, shortness of breath and stridor  Cardiovascular: Negative for chest pain and palpitations  Gastrointestinal: Negative for abdominal pain, diarrhea, nausea and vomiting  Genitourinary: Negative for difficulty urinating, dysuria, flank pain, hematuria, vaginal bleeding and vaginal discharge  Musculoskeletal: Positive for myalgias ( left lower extremity)  Negative for arthralgias, back pain, neck pain and neck stiffness  Skin: Positive for rash (Left great toe)  Negative for color change  Neurological: Negative for dizziness, syncope, facial asymmetry, weakness, light-headedness, numbness and headaches  Psychiatric/Behavioral: Negative for confusion and suicidal ideas  All other systems reviewed and are negative  Physical Exam  Physical Exam  Vitals reviewed  Constitutional:       General: She is not in acute distress  Appearance: Normal appearance  She is obese  She is not ill-appearing, toxic-appearing or diaphoretic  HENT:      Head: Normocephalic and atraumatic  Right Ear: External ear normal       Left Ear: External ear normal       Nose: Nose normal  No congestion or rhinorrhea  Mouth/Throat:      Mouth: Mucous membranes are moist       Pharynx: Oropharynx is clear  No oropharyngeal exudate or posterior oropharyngeal erythema  Eyes:      General: No scleral icterus  Right eye: No discharge  Left eye: No discharge  Extraocular Movements: Extraocular movements intact  Conjunctiva/sclera: Conjunctivae normal       Pupils: Pupils are equal, round, and reactive to light  Cardiovascular:      Rate and Rhythm: Normal rate and regular rhythm  Pulses: Normal pulses  Heart sounds: Normal heart sounds  No murmur heard  No friction rub  No gallop  Pulmonary:      Effort: Pulmonary effort is normal  No respiratory distress  Breath sounds: Normal breath sounds  No stridor  No wheezing, rhonchi or rales  Abdominal:      General: Abdomen is flat  Palpations: Abdomen is soft  Tenderness: There is no abdominal tenderness  There is no guarding or rebound  Musculoskeletal:      Cervical back: Normal range of motion and neck supple  Right lower leg: No edema  Left lower leg: No edema  Left foot: Laceration (Open diabetic ulcer to the distal phalanx of the left 1st toe  Wound appears necrotic  Surrounding erythema  No crepitus ) present  Skin:     General: Skin is warm and dry        Capillary Refill: Capillary refill takes less than 2 seconds  Neurological:      General: No focal deficit present  Mental Status: She is alert and oriented to person, place, and time     Psychiatric:         Mood and Affect: Mood normal          Behavior: Behavior normal          Vital Signs  ED Triage Vitals   Temperature Pulse Respirations Blood Pressure SpO2   07/02/21 0034 07/02/21 0034 07/02/21 0032 07/02/21 0032 07/02/21 0032   97 8 °F (36 6 °C) 84 18 115/95 96 %      Temp Source Heart Rate Source Patient Position - Orthostatic VS BP Location FiO2 (%)   07/02/21 0032 07/02/21 0032 07/02/21 0032 07/02/21 0032 --   Oral Monitor Sitting Right arm       Pain Score       --                  Vitals:    07/02/21 0032 07/02/21 0034 07/02/21 0200   BP: 115/95  118/96   Pulse:  84 80   Patient Position - Orthostatic VS: Sitting  Lying         Visual Acuity      ED Medications  Medications   cefepime (MAXIPIME) 2,000 mg in dextrose 5 % 50 mL IVPB (has no administration in time range)   vancomycin (VANCOCIN) 2,000 mg in sodium chloride 0 9 % 500 mL IVPB (has no administration in time range)   iohexol (OMNIPAQUE) 350 MG/ML injection (SINGLE-DOSE) 100 mL (100 mL Intravenous Given 7/2/21 0143)       Diagnostic Studies  Results Reviewed     Procedure Component Value Units Date/Time    Pregnancy Test (HCG Qualitative) [[id number]]     Lab Status: No result Specimen: Blood     Comprehensive metabolic panel [[id number]]  (Abnormal) Collected: 07/02/21 0201    Lab Status: Final result Specimen: Blood from Arm, Right Updated: 07/02/21 0224     Sodium 142 mmol/L      Potassium 3 4 mmol/L      Chloride 107 mmol/L      CO2 28 mmol/L      ANION GAP 7 mmol/L      BUN 8 mg/dL      Creatinine 0 80 mg/dL      Glucose 142 mg/dL      Calcium 8 5 mg/dL      Corrected Calcium 9 2 mg/dL      AST 19 U/L      ALT 34 U/L      Alkaline Phosphatase 102 U/L      Total Protein 6 8 g/dL      Albumin 3 1 g/dL      Total Bilirubin 0 34 mg/dL      eGFR 99 ml/min/1 73sq m     Narrative:      National Kidney Disease Foundation guidelines for Chronic Kidney Disease (CKD):     Stage 1 with normal or high GFR (GFR > 90 mL/min/1 73 square meters)    Stage 2 Mild CKD (GFR = 60-89 mL/min/1 73 square meters)    Stage 3A Moderate CKD (GFR = 45-59 mL/min/1 73 square meters)    Stage 3B Moderate CKD (GFR = 30-44 mL/min/1 73 square meters)    Stage 4 Severe CKD (GFR = 15-29 mL/min/1 73 square meters)    Stage 5 End Stage CKD (GFR <15 mL/min/1 73 square meters)  Note: GFR calculation is accurate only with a steady state creatinine    CBC and differential [[id number]]  (Abnormal) Collected: 07/02/21 0201    Lab Status: Final result Specimen: Blood from Arm, Right Updated: 07/02/21 0207     WBC 7 78 Thousand/uL      RBC 4 53 Million/uL      Hemoglobin 12 0 g/dL      Hematocrit 39 1 %      MCV 86 fL      MCH 26 5 pg      MCHC 30 7 g/dL      RDW 14 6 %      MPV 9 7 fL      Platelets 010 Thousands/uL      nRBC 0 /100 WBCs      Neutrophils Relative 51 %      Immat GRANS % 0 %      Lymphocytes Relative 38 %      Monocytes Relative 8 %      Eosinophils Relative 2 %      Basophils Relative 1 %      Neutrophils Absolute 3 99 Thousands/µL      Immature Grans Absolute 0 03 Thousand/uL      Lymphocytes Absolute 2 92 Thousands/µL      Monocytes Absolute 0 65 Thousand/µL      Eosinophils Absolute 0 15 Thousand/µL      Basophils Absolute 0 04 Thousands/µL                  CT lower extremity w contrast left   Final Result by Epifanio Chauhan DO (07/02 0971)      Increased sclerosis of the distal portion of the 1st digit with overlying soft tissue swelling which may represent underlying osteomyelitis  Further evaluation with a bone scan or MRI is recommended for confirmation  Small fluid collection overlying the 2nd digit               I personally discussed this study with Philip Duncan on 7/2/2021 at 2:25 AM                   Workstation performed: [id number] Procedures  Procedures         ED Course                             SBIRT 20yo+      Most Recent Value   SBIRT (22 yo +)   In order to provide better care to our patients, we are screening all of our patients for alcohol and drug use  Would it be okay to ask you these screening questions? Yes Filed at: 07/02/2021 0206   Initial Alcohol Screen: US AUDIT-C    1  How often do you have a drink containing alcohol?  0 Filed at: 07/02/2021 0206   2  How many drinks containing alcohol do you have on a typical day you are drinking? 0 Filed at: 07/02/2021 0206   3b  FEMALE Any Age, or MALE 65+: How often do you have 4 or more drinks on one occassion? 0 Filed at: 07/02/2021 0206   Audit-C Score  0 Filed at: 07/02/2021 0206   HERACLIO: How many times in the past year have you    Used an illegal drug or used a prescription medication for non-medical reasons? Never Filed at: 07/02/2021 0206                    MDM  Number of Diagnoses or Management Options  Diabetic foot ulcer with osteomyelitis [facility])  Diagnosis management comments: Patient is a 25-year-old female with a past medical history significant for diabetes, anxiety, depression, bipolar disorder who presents to the emergency department for evaluation of a wound to her left great toe that began in December of last year  Patient states that this wound has been present since December and has not been healing  She has followed up with Wound Care  She has been taking clindamycin and ciprofloxacin without relief  Patient states that she came to the emergency department today because she developed pain from her toe that radiates up her left lower extremity  She is not having any fevers or chills  She denies any chest pain, difficulty breathing, abdominal pain, nausea, vomiting, diarrhea  Patient denies all other symptoms at this time  Patient appears comfortable in bed, she is not in any acute distress    Her vital signs are normal   She does have a 2 x 2 cm necrotic diabetic wound to her left right great toe  There is no crepitus  There is minimal surrounding erythema  Patient labs unremarkable  No leukocytosis  CT of the lower extremity revealed increased sclerosis of the to school portion of the 1st digit with overlying soft tissue swelling which may represent underlying osteomyelitis  Bone scan or MRI recommended  Patient was started on cefepime and vancomycin  Patient was admitted to [facility]  Patient is agreeable with this plan  Patient is stable at this time  Amount and/or Complexity of Data Reviewed  Clinical lab tests: ordered and reviewed  Tests in the radiology section of CPT®: ordered and reviewed    Patient Progress  Patient progress: stable      Disposition  Final diagnoses:   Diabetic foot ulcer with osteomyelitis ([address] )     Time reflects when diagnosis was documented in both MDM as applicable and the Disposition within this note     Time User Action Codes Description Comment    7/2/2021  3:43 AM Kathy Mays Add [R57 [id number],  E11 69,  L97 509,  M86 9] Diabetic foot ulcer with osteomyelitis [facility])       ED Disposition     ED Disposition Condition Date/Time Comment    Admit Stable Fri Jul 2, 2021  3:43 AM Case was discussed with LEXII and the patient's admission status was agreed to be Admission Status: inpatient status to the service of Dr Lawyer Jordan           Follow-up Information    None         Current Discharge Medication List      CONTINUE these medications which have NOT CHANGED    Details   atorvastatin (LIPITOR) 20 mg tablet Take 20 mg by mouth      Dulaglutide (Trulicity) 1 5 PY/5 3RB SOPN Inject 1 5 mg under the skin once a week      Empagliflozin 10 MG TABS Take 10 mg by mouth      gabapentin (NEURONTIN) 300 mg capsule Take 300 mg by mouth      glipiZIDE (GLUCOTROL XL) 5 mg 24 hr tablet Take 5 mg by mouth daily      lamoTRIgine (LaMICtal) 100 mg tablet Take 100 mg by mouth      metFORMIN (GLUCOPHAGE) 1000 MG tablet Take 1,000 mg by mouth 2 (two) times a day with meals      Mirabegron ER 50 MG TB24 Take 50 mg by mouth      naproxen (NAPROSYN) 500 mg tablet Take 500 mg by mouth 2 (two) times a day with meals      topiramate (TOPAMAX) 100 mg tablet Take 100 mg by mouth Takes 2 tabs at bed time      ziprasidone (GEODON) 60 mg capsule Take 60 mg by mouth           No discharge procedures on file      PDMP Review     None          ED Provider  Electronically Signed by           Andres Beasley PA-C  07/02/21 5350

## 2021-07-02 NOTE — ASSESSMENT & PLAN NOTE
· CT left lower extremity revealing increased sclerosis of distal portion of 1st digit, cellulitis with possible osteomyelitis, small fluid over 2nd digit  · Appreciate podiatry consult will keep NPO  · Initially IV cefepime and IV vancomycin ordered in the ED, but will discontinue as patient with reported possible allergic reaction to vancomycin verses red man syndrome  · Per review of chart and past hospitalization will initiate patient on IV ceftaroline 600 mg q 12 hours due to history of MRSA infection and IV Flagyl 500 mg q 8 hours  · Consider ID consult if no change with antibiotics  · Per radiology recommendation MRI left foot ordered  · P r n   Tylenol, oxycodone for pain

## 2021-07-02 NOTE — CASE MANAGEMENT
CM met with patient bedside  Patient reports living with friend in a 2-story home  Patient states IPTA with all ADLs    Patient denies any DME use at home       No Hx VNA, STR, or SA identified      MH Hx confirmed  Patient states she has multiple Dx including AVE, MDD, and bipolar  Patient does not follow with an OP [place] [address] provider at this time  CM provided information regarding local OP [facility] provider  Patient states she will follow-up with this independently       PCP: Dr Chanell Peter       Preferred Pharmacy: [facility], no barriers identified to obtaining Rx from that location      CM reviewed discharge planning process including the following: identifying help at home, patient preference for discharge planning needs, pharmacy preference, and availability of treatment team to discuss questions or concerns patient and/or family may have regarding understanding medications and recognizing signs and symptoms once discharged  CM also encouraged patient to follow up with all recommended appointments after discharge  Patient advised of importance for patient and family to participate in managing patients medical well being            Patient reports that her friend, Sophia Khan, will transport at discharge

## 2021-07-02 NOTE — PROGRESS NOTES
[facility]  Progress Note - Neeraj Joyner 1990, 27 y o  female MRN: [id number]  Unit/Bed#: -Timothy Encounter: [id number]  Primary Care Provider: Ji Bradley PA-C   Date and time admitted to hospital: 7/2/2021 12:36 AM    * Osteomyelitis ([facility] )  Assessment & Plan  · CT scan suggestive of left great toe osteomyelitis  · Sugars are currently well controlled  · Patient has history of amputation of the right toes  · Podiatry consulted  · Secondary to antibiotic allergy, consult Infectious Disease as the need for ceftaroline use was warranted on admission  Further antibiotic decisions as per Infectious Disease  · Pain well controlled    Hypokalemia  Assessment & Plan  · Replace and recheck    Morbid obesity ([place] [address] )  Assessment & Plan  Morbid obesity, in the setting of diabetes, as evidenced by BMI 42 30, being assisted with mobility as needed and monitoring of blood sugars and provide with healthy diet when able to eat  Seizure disorder ([place] [address] )  Assessment & Plan  · Stable  ·  No current activity noted  · Continue home Topamax, Lamictal, gabapentin    Diabetes [facility])  Assessment & Plan  Lab Results   Component Value Date    HGBA1C 8 4 (H) 05/03/2021       Recent Labs     07/02/21  0741   POCGLU 115       Blood Sugar Average: Last 72 hrs:  · (P) 115   · Currently stable  · hold all p o  Diabetic medications  · Blood glucose checks and sliding scale insulin q 6 hours while NPO, hypoglycemia protocol          VTE Pharmacologic Prophylaxis: VTE Score: 3 Moderate Risk (Score 3-4) - Pharmacological DVT Prophylaxis Ordered: heparin  Patient Centered Rounds: I performed bedside rounds with nursing staff today  Discussions with Specialists or Other Care Team Provider:  No    Education and Discussions with Family / Patient: No family contacts on file  Patient has been updated and she will be updating her friends and family  Time Spent for Care: 20 minutes   More than 50% of total time spent on counseling and coordination of care as described above  Current Length of Stay: 0 day(s)  Current Patient Status: Inpatient   Certification Statement: The patient will continue to require additional inpatient hospital stay due to Need for further care as per above assessment and plan  Discharge Plan: TBD    Code Status: Level 1 - Full Code    Subjective:   Seen and examined at bedside  No new complaints  Pain controlled    Objective:     Vitals:   Temp (24hrs), Av 6 °F (36 4 °C), Min:97 5 °F (36 4 °C), Max:97 8 °F (36 6 °C)    Temp:  [97 5 °F (36 4 °C)-97 8 °F (36 6 °C)] 97 5 °F (36 4 °C)  HR:  [80-84] 80  Resp:  [18] 18  BP: (102-118)/(62-96) 102/62  SpO2:  [92 %-96 %] 94 %  Body mass index is 42 3 kg/m²       Input and Output Summary (last 24 hours):   No intake or output data in the 24 hours ending 21 1106    Physical Exam:   Physical Exam General- Awake, alert and oriented x 3, looks comfortable  HEENT- Normocephalic, atraumatic, oral mucosa- moist  Neck- Supple, No carotid bruit, no JVD  CVS- Normal S1/ S2, Regular rate and rhythm, No murmur, No edema, pulses palpable bilateral dorsalis pedis  Respiratory system- B/L clear breath sounds, no wheezing  Abdomen- Soft, Non distended, no tenderness, Bowel sound- present 4 quads  Genitourinary- No suprapubic tenderness, No CVA tenderness  Skin- No new bruise or rash  Musculoskeletal- left great toe ulcer visible, no discharge, necrotic slough visible Psych- No acute psychosis  CNS- CN II- XII grossly intact, No acute focal neurologic deficit noted      Additional Data:     Labs:  Results from last 7 days   Lab Units 21  0201   WBC Thousand/uL 7 78   HEMOGLOBIN g/dL 12 0   HEMATOCRIT % 39 1   PLATELETS Thousands/uL 247   NEUTROS PCT % 51   LYMPHS PCT % 38   MONOS PCT % 8   EOS PCT % 2     Results from last 7 days   Lab Units 21  0201   SODIUM mmol/L 142   POTASSIUM mmol/L 3 4*   CHLORIDE mmol/L 107   CO2 mmol/L 28   BUN mg/dL 8 CREATININE mg/dL 0 80   ANION GAP mmol/L 7   CALCIUM mg/dL 8 5   ALBUMIN g/dL 3 1*   TOTAL BILIRUBIN mg/dL 0 34   ALK PHOS U/L 102   ALT U/L 34   AST U/L 19   GLUCOSE RANDOM mg/dL 142*         Results from last 7 days   Lab Units 07/02/21  0741   POC GLUCOSE mg/dl 115               Lines/Drains:  Invasive Devices     Peripheral Intravenous Line            Peripheral IV 07/02/21 Right Antecubital <1 day                      Imaging: Reviewed radiology reports from this admission including: Leg CT    Recent Cultures (last 7 days):         Last 24 Hours Medication List:   Current Facility-Administered Medications   Medication Dose Route Frequency Provider Last Rate    acetaminophen  650 mg Oral Q6H PRN Gigien Galeazzi, PA-AZRA      aluminum-magnesium hydroxide-simethicone  30 mL Oral Q6H PRN [facility] Galeazzi, PA-AZRA      atorvastatin  20 mg Oral Daily With Dinner Sven Galeazzi, PA-C      ceftaroline (TEFLARO) IV  600 mg Intravenous Q12H [facility] Galeazzi, PA-C 600 mg (07/02/21 0610)    gabapentin  600 mg Oral TID Gigien Galeazzi, PA-AZRA      insulin lispro  1-5 Units Subcutaneous Q6H [facility] Gigien Galeazzi, PA-C      lamoTRIgine  100 mg Oral Daily Gigien Galeazzi, PA-C      metroNIDAZOLE  500 mg Intravenous Q8H Gigien Galeazzi, PA-C 500 mg (07/02/21 0524)    nicotine  1 patch Transdermal Daily Gigien Galeazzi, PA-C      ondansetron  4 mg Intravenous Q6H PRN Gigien Galeazzi, PA-C      oxybutynin  10 mg Oral Daily Gigien Galeazzi, PA-C      oxyCODONE  2 5 mg Oral Q4H PRN Gigien Galeazzi, PA-AZRA      sertraline  100 mg Oral Daily Gigien Galeazzi, PA-C      topiramate  200 mg Oral HS Sven Galeazzi, PA-C          Today, Patient Was Seen By: Ronald Rajan MD    **Please Note: This note may have been constructed using a voice recognition system  **

## 2021-07-02 NOTE — ASSESSMENT & PLAN NOTE
· CT scan suggestive of left great toe osteomyelitis  · Sugars are currently well controlled  · Patient has history of amputation of the right toes  · Podiatry consulted  · Secondary to antibiotic allergy, consult Infectious Disease as the need for ceftaroline use was warranted on admission    Further antibiotic decisions as per Infectious Disease  · Pain well controlled

## 2021-07-02 NOTE — H&P
[facility] 1990, 27 y o  female MRN: [id number]  Unit/Bed#: -01 Encounter: [id number]  Primary Care Provider: Shakila Mazariegos PA-C   Date and time admitted to hospital: 7/2/2021 12:36 AM    * Osteomyelitis ([facility] 75 )  Assessment & Plan  · CT left lower extremity revealing increased sclerosis of distal portion of 1st digit, cellulitis with possible osteomyelitis, small fluid over 2nd digit  · Appreciate podiatry consult will keep NPO  · Initially IV cefepime and IV vancomycin ordered in the ED, but will discontinue as patient with reported possible allergic reaction to vancomycin verses red man syndrome  · Per review of chart and past hospitalization will initiate patient on IV ceftaroline 600 mg q 12 hours due to history of MRSA infection and IV Flagyl 500 mg q 8 hours  · Consider ID consult if no change with antibiotics  · Per radiology recommendation MRI left foot ordered  · P r n  Tylenol, oxycodone for pain    Hypokalemia  Assessment & Plan  · Slightly decreased at 3 4  · Will give 1 time dose p o  Potassium chloride 40 mEq  · Monitor BMP    Seizure disorder (HCC)  Assessment & Plan  · No current activity noted  · Continue home Topamax, Lamictal, gabapentin    Diabetes ([facility] 75 )  Assessment & Plan  Lab Results   Component Value Date    HGBA1C 8 4 (H) 05/03/2021       No results for input(s): POCGLU in the last 72 hours  Blood Sugar Average: Last 72 hrs:  ·  hold all p o  Diabetic medications  · Blood glucose checks and sliding scale insulin q 6 hours while NPO, hypoglycemia protocol    VTE Pharmacologic Prophylaxis: VTE Score: 3 Moderate Risk (Score 3-4) - Pharmacological DVT Prophylaxis Contraindicated  Sequential Compression Devices Ordered  Code Status: Level 1 - Full Code       Anticipated Length of Stay: Patient will be admitted on an inpatient basis with an anticipated length of stay of greater than 2 midnights secondary to See above      Total Time for Visit, including Counseling / Coordination of Care: 60 minutes Greater than 50% of this total time spent on direct patient counseling and coordination of care  Chief Complaint:    Chief Complaint   Patient presents with    Leg Pain     pain from the foot to the knee on the left leg       History of Present Illness:  Ruth Benoit is a 27 y o  female with a PMH of diabetes mellitus type 2, bipolar 1 disorder, obesity who presents with complaint of left foot pain at least over the last 2 days  Reports she notice wound on her left great toe and pain started radiating upper leg just below her knee  Denies any chest pain, abdominal pain, nausea or vomiting  Reports recently this past March she was hospitalized here and had her right great toe amputated for same thing  Reports at that time she did have a MRSA infection  Patient also has reported allergy to vancomycin       Review of Systems:  Review of Systems   Constitutional: Negative for activity change  Respiratory: Negative for shortness of breath  Cardiovascular: Negative for chest pain  Gastrointestinal: Negative for abdominal pain and nausea  Musculoskeletal: Positive for arthralgias, joint swelling and myalgias  Skin: Positive for wound  Neurological: Negative for dizziness, numbness and headaches         Past Medical and Surgical History:   Past Medical History:   Diagnosis Date    Anxiety     Bipolar disorder ([facility] )     Depression     Diabetes mellitus ([facility] )     Epilepsy ([facility] )     Psychiatric disorder        Past Surgical History:   Procedure Laterality Date    OTHER SURGICAL HISTORY      Patient states she has surgery on "ear tubes", and bone spur in R hand, and 2nd toe on left foot    TOE AMPUTATION Right 3/16/2021    Procedure: AMPUTATION TOE;  Surgeon: Nimesh Montiel DPM;  Location: [facility];  Service: Podiatry    TONSILLECTOMY         Meds/Allergies:  Prior to Admission medications    Medication Sig Start Date End Date Taking? Authorizing Provider   atorvastatin (LIPITOR) 20 mg tablet Take 20 mg by mouth    Historical Provider, MD   Dulaglutide (Trulicity) 1 5 LW/2 6CI SOPN Inject 1 5 mg under the skin once a week    Historical Provider, MD   Empagliflozin 10 MG TABS Take 10 mg by mouth    Historical Provider, MD   gabapentin (NEURONTIN) 300 mg capsule Take 300 mg by mouth    Historical Provider, MD   glipiZIDE (GLUCOTROL XL) 5 mg 24 hr tablet Take 5 mg by mouth daily    Historical Provider, MD   lamoTRIgine (LaMICtal) 100 mg tablet Take 100 mg by mouth    Historical Provider, MD   metFORMIN (GLUCOPHAGE) 1000 MG tablet Take 1,000 mg by mouth 2 (two) times a day with meals    Historical Provider, MD   Mirabegron ER 50 MG TB24 Take 50 mg by mouth    Historical Provider, MD   naproxen (NAPROSYN) 500 mg tablet Take 500 mg by mouth 2 (two) times a day with meals    Historical Provider, MD   topiramate (TOPAMAX) 100 mg tablet Take 100 mg by mouth Takes 2 tabs at bed time    Historical Provider, MD   ziprasidone (GEODON) 60 mg capsule Take 60 mg by mouth    Historical Provider, MD     Have reviewed home medications per review of chart  Allergies:    Allergies   Allergen Reactions    Amoxicillin Itching    Macrobid [Nitrofurantoin] Itching    Vancomycin Rash       Social History:  Marital Status: Single     Patient Pre-hospital Living Situation: Home  Patient Pre-hospital Level of Mobility: walks with cane  Patient Pre-hospital Diet Restrictions:  Diabetic  Substance Use History:   Social History     Substance and Sexual Activity   Alcohol Use Never     Social History     Tobacco Use   Smoking Status Current Some Day Smoker    Packs/day: 0 50    Types: Cigarettes    Last attempt to quit: 2020    Years since quittin 5   Smokeless Tobacco Never Used   Tobacco Comment    Pt reports smoking aprox 1 pk of cigarettes daily     Social History     Substance and Sexual Activity   Drug Use Never       Family History:  History reviewed  No pertinent family history  Physical Exam:     Vitals:   Blood Pressure: 118/96 (07/02/21 0200)  Pulse: 80 (07/02/21 0200)  Temperature: 97 8 °F (36 6 °C) (07/02/21 0034)  Temp Source: Oral (07/02/21 0032)  Respirations: 18 (07/02/21 0200)  Weight - Scale: 115 kg (254 lb 3 1 oz) (07/02/21 0032)  SpO2: 96 % (07/02/21 0200)    Physical Exam  Vitals and nursing note reviewed  Exam conducted with a chaperone present  Constitutional:       General: She is not in acute distress  Appearance: Normal appearance  She is obese  She is not diaphoretic  HENT:      Head: Normocephalic  Cardiovascular:      Rate and Rhythm: Normal rate and regular rhythm  Pulses: Normal pulses  Heart sounds: Normal heart sounds  Pulmonary:      Effort: Pulmonary effort is normal  No respiratory distress  Breath sounds: Normal breath sounds  No wheezing or rales  Abdominal:      General: Abdomen is flat  Bowel sounds are normal  There is no distension  Palpations: Abdomen is soft  Tenderness: There is no abdominal tenderness  There is no guarding  Musculoskeletal:         General: No tenderness  Right lower leg: No edema  Left lower leg: No edema  Comments: Patient with full range of motion of left ankle and left toes  No numbness or tingling or sensory deficit  Skin:     General: Skin is warm and dry  Findings: Erythema present  Comments: Ulceration noted on bottom of left 1st digit of foot with serous fluid draining and erythema  No bleeding  No tenderness to palpation in left foot or left lower extremity  No significant edema noted in left foot or left lower extremity  Neurological:      General: No focal deficit present  Mental Status: She is alert and oriented to person, place, and time  Psychiatric:         Mood and Affect: Mood normal          Behavior: Behavior normal          Thought Content:  Thought content normal          Judgment: Judgment normal          Additional Data:     Lab Results:  Results from last 7 days   Lab Units 07/02/21  0201   WBC Thousand/uL 7 78   HEMOGLOBIN g/dL 12 0   HEMATOCRIT % 39 1   PLATELETS Thousands/uL 247   NEUTROS PCT % 51   LYMPHS PCT % 38   MONOS PCT % 8   EOS PCT % 2     Results from last 7 days   Lab Units 07/02/21  0201   SODIUM mmol/L 142   POTASSIUM mmol/L 3 4*   CHLORIDE mmol/L 107   CO2 mmol/L 28   BUN mg/dL 8   CREATININE mg/dL 0 80   ANION GAP mmol/L 7   CALCIUM mg/dL 8 5   ALBUMIN g/dL 3 1*   TOTAL BILIRUBIN mg/dL 0 34   ALK PHOS U/L 102   ALT U/L 34   AST U/L 19   GLUCOSE RANDOM mg/dL 142*                       Imaging: Reviewed radiology reports from this admission including: CT left lower extremity  CT lower extremity w contrast left   Final Result by Jolanta Rodriguez DO (07/02 4562)      Increased sclerosis of the distal portion of the 1st digit with overlying soft tissue swelling which may represent underlying osteomyelitis  Further evaluation with a bone scan or MRI is recommended for confirmation  Small fluid collection overlying the 2nd digit  I personally discussed this study with Tish Coronel on 7/2/2021 at 2:25 AM                   Workstation performed: [id number]         MRI inpatient order    (Results Pending)       EKG and Other Studies Reviewed on Admission:   · EKG: No EKG obtained  ** Please Note: This note has been constructed using a voice recognition system   **

## 2021-07-02 NOTE — ASSESSMENT & PLAN NOTE
Lab Results   Component Value Date    HGBA1C 8 4 (H) 05/03/2021       No results for input(s): POCGLU in the last 72 hours  Blood Sugar Average: Last 72 hrs:  ·  hold all p o   Diabetic medications  · Blood glucose checks and sliding scale insulin q 6 hours while NPO, hypoglycemia protocol

## 2021-07-03 VITALS
TEMPERATURE: 97.4 F | RESPIRATION RATE: 20 BRPM | DIASTOLIC BLOOD PRESSURE: 65 MMHG | HEART RATE: 80 BPM | BODY MASS INDEX: 42.35 KG/M2 | SYSTOLIC BLOOD PRESSURE: 110 MMHG | OXYGEN SATURATION: 94 % | WEIGHT: 254.19 LBS | HEIGHT: 65 IN

## 2021-07-03 LAB
ANION GAP SERPL CALCULATED.3IONS-SCNC: 12 MMOL/L (ref 4–13)
BUN SERPL-MCNC: 6 MG/DL (ref 5–25)
CALCIUM SERPL-MCNC: 8.8 MG/DL (ref 8.3–10.1)
CHLORIDE SERPL-SCNC: 109 MMOL/L (ref 100–108)
CO2 SERPL-SCNC: 23 MMOL/L (ref 21–32)
CREAT SERPL-MCNC: 0.63 MG/DL (ref 0.6–1.3)
ERYTHROCYTE [DISTWIDTH] IN BLOOD BY AUTOMATED COUNT: 14.5 % (ref 11.6–15.1)
GFR SERPL CREATININE-BSD FRML MDRD: 121 ML/MIN/1.73SQ M
GLUCOSE SERPL-MCNC: 104 MG/DL (ref 65–140)
GLUCOSE SERPL-MCNC: 105 MG/DL (ref 65–140)
GLUCOSE SERPL-MCNC: 127 MG/DL (ref 65–140)
GLUCOSE SERPL-MCNC: 178 MG/DL (ref 65–140)
HCT VFR BLD AUTO: 40.8 % (ref 34.8–46.1)
HGB BLD-MCNC: 12.6 G/DL (ref 11.5–15.4)
MCH RBC QN AUTO: 26.6 PG (ref 26.8–34.3)
MCHC RBC AUTO-ENTMCNC: 30.9 G/DL (ref 31.4–37.4)
MCV RBC AUTO: 86 FL (ref 82–98)
PLATELET # BLD AUTO: 241 THOUSANDS/UL (ref 149–390)
PMV BLD AUTO: 9.8 FL (ref 8.9–12.7)
POTASSIUM SERPL-SCNC: 3.4 MMOL/L (ref 3.5–5.3)
RBC # BLD AUTO: 4.73 MILLION/UL (ref 3.81–5.12)
SODIUM SERPL-SCNC: 144 MMOL/L (ref 136–145)
WBC # BLD AUTO: 4.97 THOUSAND/UL (ref 4.31–10.16)

## 2021-07-03 PROCEDURE — 85027 COMPLETE CBC AUTOMATED: CPT | Performed by: FAMILY MEDICINE

## 2021-07-03 PROCEDURE — 99239 HOSP IP/OBS DSCHRG MGMT >30: CPT | Performed by: FAMILY MEDICINE

## 2021-07-03 PROCEDURE — 80048 BASIC METABOLIC PNL TOTAL CA: CPT | Performed by: FAMILY MEDICINE

## 2021-07-03 PROCEDURE — 82948 REAGENT STRIP/BLOOD GLUCOSE: CPT

## 2021-07-03 RX ORDER — DOXYCYCLINE 100 MG/1
100 TABLET ORAL 2 TIMES DAILY
Qty: 14 TABLET | Refills: 0 | Status: SHIPPED | OUTPATIENT
Start: 2021-07-03 | End: 2021-07-12 | Stop reason: HOSPADM

## 2021-07-03 RX ORDER — METRONIDAZOLE 500 MG/1
500 TABLET ORAL EVERY 8 HOURS SCHEDULED
Qty: 21 TABLET | Refills: 0 | Status: SHIPPED | OUTPATIENT
Start: 2021-07-03 | End: 2021-07-12 | Stop reason: HOSPADM

## 2021-07-03 RX ORDER — POTASSIUM CHLORIDE 20 MEQ/1
40 TABLET, EXTENDED RELEASE ORAL ONCE
Status: COMPLETED | OUTPATIENT
Start: 2021-07-03 | End: 2021-07-03

## 2021-07-03 RX ADMIN — METRONIDAZOLE 500 MG: 500 TABLET, FILM COATED ORAL at 05:50

## 2021-07-03 RX ADMIN — CEFTAROLINE FOSAMIL 600 MG: 600 POWDER, FOR SOLUTION INTRAVENOUS at 05:51

## 2021-07-03 RX ADMIN — GABAPENTIN 600 MG: 300 CAPSULE ORAL at 09:22

## 2021-07-03 RX ADMIN — POTASSIUM CHLORIDE 40 MEQ: 1500 TABLET, EXTENDED RELEASE ORAL at 09:21

## 2021-07-03 RX ADMIN — OXYBUTYNIN CHLORIDE 10 MG: 5 TABLET, EXTENDED RELEASE ORAL at 09:21

## 2021-07-03 RX ADMIN — NICOTINE 1 PATCH: 7 PATCH, EXTENDED RELEASE TRANSDERMAL at 09:21

## 2021-07-03 RX ADMIN — LAMOTRIGINE 100 MG: 100 TABLET ORAL at 09:21

## 2021-07-03 NOTE — DISCHARGE INSTR - AVS FIRST PAGE
· You have been started on doxycycline and Flagyl at discharge for 1 week  You have tolerated Flagyl in the hospital without getting any reactions  So you do not have any allergies to Flagyl    · You need to follow-up with her podiatrist within a week  · You need to follow-up with the wound clinic per your schedule  · Follow-up with your PCP for your diabetes

## 2021-07-03 NOTE — DISCHARGE SUMMARY
[address] [facility]  Discharge- Vika Bell 1990, 27 y o  female MRN: [id number]  Unit/Bed#: -Timothy Encounter: [id number]  Primary Care Provider: Yogi Dover PA-C   Date and time admitted to hospital: 7/2/2021 12:36 AM    * Osteitis ([facility] )  Assessment & Plan  · CT scan suggestive of left great toe osteomyelitis but MRI does not show any osteomyelitis but it shows osteitis  · Cleared by Podiatry for discharge  No intervention inpatient  Recommended 1 week of outpatient antibiotics and then follow-up with podiatry  · Discussed with the patient and her POA and they are in agreement with the plan    Previously  · Sugars are currently well controlled  · Patient has history of amputation of the right toes  · Podiatry consulted  · Secondary to antibiotic allergy, consult Infectious Disease as the need for ceftaroline use was warranted on admission  Further antibiotic decisions as per Infectious Disease  · Pain well controlled    Hypokalemia  Assessment & Plan  · Replace and recheck    Morbid obesity ([facility] 75 )  Assessment & Plan  Morbid obesity, in the setting of diabetes, as evidenced by BMI 42 30, being assisted with mobility as needed and monitoring of blood sugars and provide with healthy diet when able to eat  Seizure disorder ([facility] 75 )  Assessment & Plan  · Stable  ·  No current activity noted  · Continue home Topamax, Lamictal, gabapentin    Diabetes [facility])  Assessment & Plan  Lab Results   Component Value Date    HGBA1C 8 4 (H) 05/03/2021       Recent Labs     07/02/21  1738 07/03/21  0053 07/03/21  0541 07/03/21  1154   POCGLU 83 104 105 178*       Blood Sugar Average: Last 72 hrs:  · (P) 117   · Currently stable  · hold all p o   Diabetic medications  · Blood glucose checks and sliding scale insulin q 6 hours while NPO, hypoglycemia protocol        Medical Problems     Resolved Problems  Date Reviewed: 7/3/2021    None              Discharging Physician / Practitioner: Ambika Hu MD  PCP: Staci Elena PA-C  Admission Date:   Admission Orders (From admission, onward)     Ordered        07/02/21 0344  Inpatient Admission  Once                   Discharge Date: 07/03/21    Consultations During Hospital Stay:  · Podiatry    Procedures Performed:   · MRI    Significant Findings / Test Results:   · Osteitis  No osteomyelitis    Incidental Findings:   · None     Test Results Pending at Discharge (will require follow up): · None     Outpatient Tests Requested:  · None    Complications:  None    Reason for Admission:  Possible osteomyelitis    Hospital Course:   Rosetta Kumari is a 27 y o  female patient who originally presented to the hospital on 7/2/2021 due to possible osteomyelitis  Osteomyelitis was reported by the CT scan  MRI which is more sensitive for osteomyelitis was done  It shows osteitis but no osteomyelitis  Cleared by Podiatry for discharge to home with the oral antibiotic for a week and then follow-up with podiatry thereafter to see improvement  Patient was treated with IV Flagyl and ceftaroline while she was here  Please see above list of diagnoses and related plan for additional information       Condition at Discharge: stable    Discharge Day Visit / Exam:   Subjective:  I am feeling fine  Vitals: Blood Pressure: 110/65 (07/03/21 0656)  Pulse: 80 (07/02/21 0743)  Temperature: (!) 97 4 °F (36 3 °C) (07/03/21 0656)  Temp Source: Oral (07/02/21 0032)  Respirations: 20 (07/03/21 0656)  Height: 5' 5" (165 1 cm) (07/02/21 1100)  Weight - Scale: 115 kg (254 lb 3 1 oz) (07/02/21 0032)  SpO2: 94 % (07/02/21 0743)  Exam:   Physical Exam General- Awake, alert and oriented x 3, looks comfortable  HEENT- Normocephalic, atraumatic, oral mucosa- moist  Neck- Supple, No carotid bruit, no JVD  CVS- Normal S1/ S2, Regular rate and rhythm, No murmur, No edema  Respiratory system- B/L clear breath sounds, no wheezing  Abdomen- Soft, Non distended, no tenderness, Bowel sound- present 4 quads  Genitourinary- No suprapubic tenderness, No CVA tenderness  Skin- infected to under dressing, minimal drainage-improving  Musculoskeletal- previous amputation of the toe noted  Psych- No acute psychosis  CNS- CN II- XII grossly intact, No acute focal neurologic deficit noted      Discussion with Family: Patient's power of  Earle Ruiz has been updated thoroughly over the phone  All questions have been answered to satisfaction  Discharge instructions/Information to patient and family:   See after visit summary for information provided to patient and family  Provisions for Follow-Up Care:  See after visit summary for information related to follow-up care and any pertinent home health orders  Disposition:   Home    Planned Readmission:  No     Discharge Statement:  I spent 45 minutes discharging the patient  This time was spent on the day of discharge  I had direct contact with the patient on the day of discharge  Greater than 50% of the total time was spent examining patient, answering all patient questions, arranging and discussing plan of care with patient as well as directly providing post-discharge instructions  Additional time then spent on discharge activities  Discharge Medications:  See after visit summary for reconciled discharge medications provided to patient and/or family        **Please Note: This note may have been constructed using a voice recognition system**

## 2021-07-03 NOTE — ASSESSMENT & PLAN NOTE
Lab Results   Component Value Date    HGBA1C 8 4 (H) 05/03/2021       Recent Labs     07/02/21  1738 07/03/21  0053 07/03/21  0541 07/03/21  1154   POCGLU 83 104 105 178*       Blood Sugar Average: Last 72 hrs:  · (P) 117   · Currently stable  · hold all p o   Diabetic medications  · Blood glucose checks and sliding scale insulin q 6 hours while NPO, hypoglycemia protocol

## 2021-07-03 NOTE — ASSESSMENT & PLAN NOTE
· CT scan suggestive of left great toe osteomyelitis but MRI does not show any osteomyelitis but it shows osteitis  · Cleared by Podiatry for discharge  No intervention inpatient  Recommended 1 week of outpatient antibiotics and then follow-up with podiatry  · Discussed with the patient and her POA and they are in agreement with the plan    Previously  · Sugars are currently well controlled  · Patient has history of amputation of the right toes  · Podiatry consulted  · Secondary to antibiotic allergy, consult Infectious Disease as the need for ceftaroline use was warranted on admission    Further antibiotic decisions as per Infectious Disease  · Pain well controlled

## 2021-07-07 NOTE — PHYSICIAN ADVISOR
Current patient class: Inpatient  The patient is currently on Hospital Day: 2 at [address]      The patient was admitted to the hospital  on 7/2/21 at  3:44 AM for the following diagnosis:  Leg pain [M79 606]  Diabetic foot ulcer with osteomyelitis ([address] ) [C47 394, E11 69, L97 509, M86 9]     After review of the relevant documentation, labs, vital signs and test results, this is a PROVIDER LIABLE case  In this particular case the patient was admitted to the hospital as an inpatient  The patient however failed to satisfy the 2 midnight benchmark and closer scrutiny of the case was warranted  After review of the patient presentation and relevant labs the patient was most appropriate for observation or outpatient class at the time of admission  Given that this patient has already been discharged prior to this review they become a provider liable case  Rationale is as follows: The patient is a 27 yrs   Female who presented to the ED at 7/2/2021 12:36 AM with a chief complaint of Leg Pain (pain from the foot to the knee on the left leg)   The patient was brought in to rule out osteomyelitis  Patient was seen in consultation by both Podiatry Infectious Disease  No acute intervention was required the patient was negative for osteomyelitis  The patient was most appropriate for observation status on admission and discharged the next day    This case is provider liable    The patients vitals on arrival were   ED Triage Vitals   Temperature Pulse Respirations Blood Pressure SpO2   07/02/21 0034 07/02/21 0034 07/02/21 0032 07/02/21 0032 07/02/21 0032   97 8 °F (36 6 °C) 84 18 115/95 96 %      Temp Source Heart Rate Source Patient Position - Orthostatic VS BP Location FiO2 (%)   07/02/21 0032 07/02/21 0032 07/02/21 0032 07/02/21 0032 --   Oral Monitor Sitting Right arm       Pain Score       07/02/21 0456       No Pain           Past Medical History:   Diagnosis Date    Anxiety     Bipolar disorder ([facility] )     Depression     Diabetes mellitus ([facility] )     Epilepsy ([place] [address] )     Psychiatric disorder      Past Surgical History:   Procedure Laterality Date    OTHER SURGICAL HISTORY      Patient states she has surgery on "ear tubes", and bone spur in R hand, and 2nd toe on left foot    TOE AMPUTATION Right 3/16/2021    Procedure: AMPUTATION TOE;  Surgeon: Johann Anton DPM;  Location: [facility];  Service: Podiatry    TONSILLECTOMY             Consults have been placed to:   IP CONSULT TO PODIATRY  IP CONSULT TO INFECTIOUS DISEASES    Vitals:    07/02/21 0743 07/02/21 1100 07/02/21 2323 07/03/21 0656   BP: 102/62  106/65 110/65   BP Location:       Pulse: 80      Resp: 18  19 20   Temp: 97 5 °F (36 4 °C)  98 °F (36 7 °C) (!) 97 4 °F (36 3 °C)   TempSrc:       SpO2: 94%      Weight:       Height:  5' 5" (1 651 m)         Most recent labs:    No results for input(s): WBC, HGB, HCT, PLT, K, NA, CALCIUM, BUN, CREATININE, LIPASE, AMYLASE, INR, TROPONINI, CKTOTAL, AST, ALT, ALKPHOS, BILITOT in the last 72 hours  Scheduled Meds:  Continuous Infusions:No current facility-administered medications for this encounter  PRN Meds:      Surgical procedures (if appropriate):

## 2021-07-10 ENCOUNTER — APPOINTMENT (EMERGENCY)
Dept: RADIOLOGY | Facility: HOSPITAL | Age: 31
DRG: 392 | End: 2021-07-10
Payer: MEDICARE

## 2021-07-10 ENCOUNTER — HOSPITAL ENCOUNTER (INPATIENT)
Facility: HOSPITAL | Age: 31
LOS: 1 days | Discharge: HOME | DRG: 392 | End: 2021-07-12
Attending: EMERGENCY MEDICINE | Admitting: INTERNAL MEDICINE
Payer: MEDICARE

## 2021-07-10 DIAGNOSIS — L97.529 DIABETIC ULCER OF LEFT GREAT TOE (HCC): ICD-10-CM

## 2021-07-10 DIAGNOSIS — R19.7 NAUSEA VOMITING AND DIARRHEA: Primary | ICD-10-CM

## 2021-07-10 DIAGNOSIS — E11.621 DIABETIC ULCER OF LEFT GREAT TOE (HCC): ICD-10-CM

## 2021-07-10 DIAGNOSIS — R11.2 NAUSEA VOMITING AND DIARRHEA: Primary | ICD-10-CM

## 2021-07-10 LAB — GLUCOSE SERPL-MCNC: 107 MG/DL (ref 65–140)

## 2021-07-10 PROCEDURE — 99285 EMERGENCY DEPT VISIT HI MDM: CPT

## 2021-07-10 PROCEDURE — 73630 X-RAY EXAM OF FOOT: CPT

## 2021-07-10 PROCEDURE — 82948 REAGENT STRIP/BLOOD GLUCOSE: CPT

## 2021-07-10 PROCEDURE — 99285 EMERGENCY DEPT VISIT HI MDM: CPT | Performed by: PHYSICIAN ASSISTANT

## 2021-07-10 RX ORDER — ONDANSETRON 2 MG/ML
4 INJECTION INTRAMUSCULAR; INTRAVENOUS ONCE
Status: COMPLETED | OUTPATIENT
Start: 2021-07-10 | End: 2021-07-11

## 2021-07-11 ENCOUNTER — APPOINTMENT (EMERGENCY)
Dept: CT IMAGING | Facility: HOSPITAL | Age: 31
DRG: 392 | End: 2021-07-11
Payer: MEDICARE

## 2021-07-11 PROBLEM — R19.7 NAUSEA VOMITING AND DIARRHEA: Status: ACTIVE | Noted: 2021-07-11

## 2021-07-11 PROBLEM — E11.621 DIABETIC ULCER OF TOE OF LEFT FOOT ASSOCIATED WITH TYPE 2 DIABETES MELLITUS (HCC): Status: ACTIVE | Noted: 2021-07-11

## 2021-07-11 PROBLEM — L97.519 DIABETIC ULCER OF TOE OF RIGHT FOOT ASSOCIATED WITH TYPE 2 DIABETES MELLITUS (HCC): Status: ACTIVE | Noted: 2021-07-11

## 2021-07-11 PROBLEM — R11.2 NAUSEA VOMITING AND DIARRHEA: Status: ACTIVE | Noted: 2021-07-11

## 2021-07-11 PROBLEM — N30.00 ACUTE CYSTITIS WITHOUT HEMATURIA: Status: ACTIVE | Noted: 2021-07-11

## 2021-07-11 PROBLEM — E11.621 DIABETIC ULCER OF TOE OF RIGHT FOOT ASSOCIATED WITH TYPE 2 DIABETES MELLITUS (HCC): Status: ACTIVE | Noted: 2021-07-11

## 2021-07-11 PROBLEM — L97.529 DIABETIC ULCER OF TOE OF LEFT FOOT ASSOCIATED WITH TYPE 2 DIABETES MELLITUS (HCC): Status: ACTIVE | Noted: 2021-07-11

## 2021-07-11 LAB
ALBUMIN SERPL BCP-MCNC: 3.7 G/DL (ref 3.5–5)
ALP SERPL-CCNC: 114 U/L (ref 46–116)
ALT SERPL W P-5'-P-CCNC: 36 U/L (ref 12–78)
ANION GAP SERPL CALCULATED.3IONS-SCNC: 10 MMOL/L (ref 4–13)
AST SERPL W P-5'-P-CCNC: 18 U/L (ref 5–45)
BACTERIA UR QL AUTO: ABNORMAL /HPF
BASE EX.OXY STD BLDV CALC-SCNC: 37.9 % (ref 60–80)
BASE EXCESS BLDV CALC-SCNC: -2.9 MMOL/L
BASOPHILS # BLD AUTO: 0.04 THOUSANDS/ΜL (ref 0–0.1)
BASOPHILS NFR BLD AUTO: 0 % (ref 0–1)
BETA-HYDROXYBUTYRATE: 0.4 MMOL/L
BILIRUB DIRECT SERPL-MCNC: 0.11 MG/DL (ref 0–0.2)
BILIRUB SERPL-MCNC: 0.44 MG/DL (ref 0.2–1)
BILIRUB UR QL STRIP: ABNORMAL
BUN SERPL-MCNC: 11 MG/DL (ref 5–25)
CALCIUM SERPL-MCNC: 9.3 MG/DL (ref 8.3–10.1)
CHLORIDE SERPL-SCNC: 106 MMOL/L (ref 100–108)
CLARITY UR: CLEAR
CO2 SERPL-SCNC: 25 MMOL/L (ref 21–32)
COLOR UR: YELLOW
CREAT SERPL-MCNC: 0.8 MG/DL (ref 0.6–1.3)
EOSINOPHIL # BLD AUTO: 0.11 THOUSAND/ΜL (ref 0–0.61)
EOSINOPHIL NFR BLD AUTO: 1 % (ref 0–6)
ERYTHROCYTE [DISTWIDTH] IN BLOOD BY AUTOMATED COUNT: 14.7 % (ref 11.6–15.1)
GFR SERPL CREATININE-BSD FRML MDRD: 99 ML/MIN/1.73SQ M
GLUCOSE SERPL-MCNC: 115 MG/DL (ref 65–140)
GLUCOSE SERPL-MCNC: 132 MG/DL (ref 65–140)
GLUCOSE SERPL-MCNC: 139 MG/DL (ref 65–140)
GLUCOSE SERPL-MCNC: 181 MG/DL (ref 65–140)
GLUCOSE SERPL-MCNC: 96 MG/DL (ref 65–140)
GLUCOSE UR STRIP-MCNC: NEGATIVE MG/DL
HCO3 BLDV-SCNC: 23.9 MMOL/L (ref 24–30)
HCT VFR BLD AUTO: 44.3 % (ref 34.8–46.1)
HGB BLD-MCNC: 13.7 G/DL (ref 11.5–15.4)
HGB UR QL STRIP.AUTO: ABNORMAL
IMM GRANULOCYTES # BLD AUTO: 0.06 THOUSAND/UL (ref 0–0.2)
IMM GRANULOCYTES NFR BLD AUTO: 1 % (ref 0–2)
KETONES UR STRIP-MCNC: NEGATIVE MG/DL
LEUKOCYTE ESTERASE UR QL STRIP: ABNORMAL
LIPASE SERPL-CCNC: 51 U/L (ref 73–393)
LYMPHOCYTES # BLD AUTO: 3.69 THOUSANDS/ΜL (ref 0.6–4.47)
LYMPHOCYTES NFR BLD AUTO: 29 % (ref 14–44)
MCH RBC QN AUTO: 26.7 PG (ref 26.8–34.3)
MCHC RBC AUTO-ENTMCNC: 30.9 G/DL (ref 31.4–37.4)
MCV RBC AUTO: 86 FL (ref 82–98)
MONOCYTES # BLD AUTO: 0.95 THOUSAND/ΜL (ref 0.17–1.22)
MONOCYTES NFR BLD AUTO: 8 % (ref 4–12)
NEUTROPHILS # BLD AUTO: 7.7 THOUSANDS/ΜL (ref 1.85–7.62)
NEUTS SEG NFR BLD AUTO: 61 % (ref 43–75)
NITRITE UR QL STRIP: NEGATIVE
NON-SQ EPI CELLS URNS QL MICRO: ABNORMAL /HPF
NRBC BLD AUTO-RTO: 0 /100 WBCS
O2 CT BLDV-SCNC: 7.9 ML/DL
OTHER STN SPEC: ABNORMAL
PCO2 BLDV: 49 MM HG (ref 42–50)
PH BLDV: 7.31 [PH] (ref 7.3–7.4)
PH UR STRIP.AUTO: 6 [PH]
PLATELET # BLD AUTO: 278 THOUSANDS/UL (ref 149–390)
PMV BLD AUTO: 10.4 FL (ref 8.9–12.7)
PO2 BLDV: 23.6 MM HG (ref 35–45)
POTASSIUM SERPL-SCNC: 3.2 MMOL/L (ref 3.5–5.3)
PROT SERPL-MCNC: 8 G/DL (ref 6.4–8.2)
PROT UR STRIP-MCNC: ABNORMAL MG/DL
RBC # BLD AUTO: 5.13 MILLION/UL (ref 3.81–5.12)
RBC #/AREA URNS AUTO: ABNORMAL /HPF
SODIUM SERPL-SCNC: 141 MMOL/L (ref 136–145)
SP GR UR STRIP.AUTO: <=1.005 (ref 1–1.03)
UROBILINOGEN UR QL STRIP.AUTO: 0.2 E.U./DL
WBC # BLD AUTO: 12.55 THOUSAND/UL (ref 4.31–10.16)
WBC #/AREA URNS AUTO: ABNORMAL /HPF

## 2021-07-11 PROCEDURE — 85025 COMPLETE CBC W/AUTO DIFF WBC: CPT | Performed by: PHYSICIAN ASSISTANT

## 2021-07-11 PROCEDURE — 87493 C DIFF AMPLIFIED PROBE: CPT | Performed by: PHYSICIAN ASSISTANT

## 2021-07-11 PROCEDURE — 80048 BASIC METABOLIC PNL TOTAL CA: CPT | Performed by: PHYSICIAN ASSISTANT

## 2021-07-11 PROCEDURE — 36415 COLL VENOUS BLD VENIPUNCTURE: CPT | Performed by: PHYSICIAN ASSISTANT

## 2021-07-11 PROCEDURE — 87086 URINE CULTURE/COLONY COUNT: CPT | Performed by: PHYSICIAN ASSISTANT

## 2021-07-11 PROCEDURE — 82948 REAGENT STRIP/BLOOD GLUCOSE: CPT

## 2021-07-11 PROCEDURE — 87106 FUNGI IDENTIFICATION YEAST: CPT | Performed by: PHYSICIAN ASSISTANT

## 2021-07-11 PROCEDURE — 96374 THER/PROPH/DIAG INJ IV PUSH: CPT

## 2021-07-11 PROCEDURE — 74177 CT ABD & PELVIS W/CONTRAST: CPT

## 2021-07-11 PROCEDURE — 96361 HYDRATE IV INFUSION ADD-ON: CPT

## 2021-07-11 PROCEDURE — 82010 KETONE BODYS QUAN: CPT | Performed by: PHYSICIAN ASSISTANT

## 2021-07-11 PROCEDURE — 99220 PR INITIAL OBSERVATION CARE/DAY 70 MINUTES: CPT | Performed by: PHYSICIAN ASSISTANT

## 2021-07-11 PROCEDURE — 87505 NFCT AGENT DETECTION GI: CPT | Performed by: PHYSICIAN ASSISTANT

## 2021-07-11 PROCEDURE — 82805 BLOOD GASES W/O2 SATURATION: CPT | Performed by: PHYSICIAN ASSISTANT

## 2021-07-11 PROCEDURE — 80076 HEPATIC FUNCTION PANEL: CPT | Performed by: PHYSICIAN ASSISTANT

## 2021-07-11 PROCEDURE — 83690 ASSAY OF LIPASE: CPT | Performed by: PHYSICIAN ASSISTANT

## 2021-07-11 PROCEDURE — 81001 URINALYSIS AUTO W/SCOPE: CPT | Performed by: PHYSICIAN ASSISTANT

## 2021-07-11 RX ORDER — CIPROFLOXACIN 2 MG/ML
400 INJECTION, SOLUTION INTRAVENOUS EVERY 12 HOURS
Status: DISCONTINUED | OUTPATIENT
Start: 2021-07-11 | End: 2021-07-12

## 2021-07-11 RX ORDER — CIPROFLOXACIN 2 MG/ML
400 INJECTION, SOLUTION INTRAVENOUS ONCE
Status: COMPLETED | OUTPATIENT
Start: 2021-07-11 | End: 2021-07-11

## 2021-07-11 RX ORDER — SERTRALINE HYDROCHLORIDE 100 MG/1
100 TABLET, FILM COATED ORAL
Status: DISCONTINUED | OUTPATIENT
Start: 2021-07-11 | End: 2021-07-12 | Stop reason: HOSPADM

## 2021-07-11 RX ORDER — GABAPENTIN 300 MG/1
600 CAPSULE ORAL 3 TIMES DAILY
Status: DISCONTINUED | OUTPATIENT
Start: 2021-07-11 | End: 2021-07-12 | Stop reason: HOSPADM

## 2021-07-11 RX ORDER — OXYBUTYNIN CHLORIDE 5 MG/1
10 TABLET, EXTENDED RELEASE ORAL DAILY
Status: DISCONTINUED | OUTPATIENT
Start: 2021-07-11 | End: 2021-07-12 | Stop reason: HOSPADM

## 2021-07-11 RX ORDER — ONDANSETRON 2 MG/ML
4 INJECTION INTRAMUSCULAR; INTRAVENOUS EVERY 6 HOURS PRN
Status: DISCONTINUED | OUTPATIENT
Start: 2021-07-11 | End: 2021-07-12 | Stop reason: HOSPADM

## 2021-07-11 RX ORDER — POTASSIUM CHLORIDE 20 MEQ/1
40 TABLET, EXTENDED RELEASE ORAL ONCE
Status: COMPLETED | OUTPATIENT
Start: 2021-07-11 | End: 2021-07-11

## 2021-07-11 RX ORDER — ATORVASTATIN CALCIUM 20 MG/1
20 TABLET, FILM COATED ORAL
Status: DISCONTINUED | OUTPATIENT
Start: 2021-07-11 | End: 2021-07-12 | Stop reason: HOSPADM

## 2021-07-11 RX ORDER — SACCHAROMYCES BOULARDII 250 MG
250 CAPSULE ORAL 2 TIMES DAILY
Status: DISCONTINUED | OUTPATIENT
Start: 2021-07-11 | End: 2021-07-12 | Stop reason: HOSPADM

## 2021-07-11 RX ORDER — MAGNESIUM HYDROXIDE/ALUMINUM HYDROXICE/SIMETHICONE 120; 1200; 1200 MG/30ML; MG/30ML; MG/30ML
30 SUSPENSION ORAL EVERY 6 HOURS PRN
Status: DISCONTINUED | OUTPATIENT
Start: 2021-07-11 | End: 2021-07-12 | Stop reason: HOSPADM

## 2021-07-11 RX ORDER — ACETAMINOPHEN 325 MG/1
975 TABLET ORAL EVERY 6 HOURS PRN
Status: DISCONTINUED | OUTPATIENT
Start: 2021-07-11 | End: 2021-07-12 | Stop reason: HOSPADM

## 2021-07-11 RX ORDER — LAMOTRIGINE 100 MG/1
100 TABLET ORAL DAILY
Status: DISCONTINUED | OUTPATIENT
Start: 2021-07-11 | End: 2021-07-12 | Stop reason: HOSPADM

## 2021-07-11 RX ORDER — NICOTINE 21 MG/24HR
1 PATCH, TRANSDERMAL 24 HOURS TRANSDERMAL DAILY
Status: DISCONTINUED | OUTPATIENT
Start: 2021-07-11 | End: 2021-07-12 | Stop reason: HOSPADM

## 2021-07-11 RX ORDER — TOPIRAMATE 100 MG/1
200 TABLET, FILM COATED ORAL
Status: DISCONTINUED | OUTPATIENT
Start: 2021-07-11 | End: 2021-07-12 | Stop reason: HOSPADM

## 2021-07-11 RX ADMIN — POTASSIUM CHLORIDE 40 MEQ: 1500 TABLET, EXTENDED RELEASE ORAL at 02:27

## 2021-07-11 RX ADMIN — TOPIRAMATE 200 MG: 100 TABLET, FILM COATED ORAL at 23:56

## 2021-07-11 RX ADMIN — SODIUM CHLORIDE 1000 ML: 0.9 INJECTION, SOLUTION INTRAVENOUS at 00:27

## 2021-07-11 RX ADMIN — Medication 250 MG: at 17:43

## 2021-07-11 RX ADMIN — Medication 250 MG: at 08:58

## 2021-07-11 RX ADMIN — CIPROFLOXACIN 400 MG: 2 INJECTION, SOLUTION INTRAVENOUS at 05:43

## 2021-07-11 RX ADMIN — LAMOTRIGINE 100 MG: 100 TABLET ORAL at 08:59

## 2021-07-11 RX ADMIN — GABAPENTIN 600 MG: 300 CAPSULE ORAL at 15:59

## 2021-07-11 RX ADMIN — ENOXAPARIN SODIUM 40 MG: 40 INJECTION SUBCUTANEOUS at 08:56

## 2021-07-11 RX ADMIN — GABAPENTIN 600 MG: 300 CAPSULE ORAL at 23:56

## 2021-07-11 RX ADMIN — CIPROFLOXACIN 400 MG: 2 INJECTION, SOLUTION INTRAVENOUS at 17:43

## 2021-07-11 RX ADMIN — OXYBUTYNIN CHLORIDE 10 MG: 5 TABLET, EXTENDED RELEASE ORAL at 08:58

## 2021-07-11 RX ADMIN — ATORVASTATIN CALCIUM 20 MG: 20 TABLET, FILM COATED ORAL at 15:59

## 2021-07-11 RX ADMIN — INSULIN LISPRO 2 UNITS: 100 INJECTION, SOLUTION INTRAVENOUS; SUBCUTANEOUS at 11:23

## 2021-07-11 RX ADMIN — GABAPENTIN 600 MG: 300 CAPSULE ORAL at 08:57

## 2021-07-11 RX ADMIN — SERTRALINE 100 MG: 100 TABLET, FILM COATED ORAL at 23:56

## 2021-07-11 RX ADMIN — IOHEXOL 100 ML: 350 INJECTION, SOLUTION INTRAVENOUS at 01:11

## 2021-07-11 RX ADMIN — ONDANSETRON 4 MG: 2 INJECTION INTRAMUSCULAR; INTRAVENOUS at 00:29

## 2021-07-11 NOTE — PLAN OF CARE
Problem: PAIN - ADULT  Goal: Verbalizes/displays adequate comfort level or baseline comfort level  Description: Interventions:  - Encourage patient to monitor pain and request assistance  - Assess pain using appropriate pain scale  - Administer analgesics based on type and severity of pain and evaluate response  - Implement non-pharmacological measures as appropriate and evaluate response  - Consider cultural and social influences on pain and pain management  - Notify physician/advanced practitioner if interventions unsuccessful or patient reports new pain  Outcome: Progressing     Problem: INFECTION - ADULT  Goal: Absence or prevention of progression during hospitalization  Description: INTERVENTIONS:  - Assess and monitor for signs and symptoms of infection  - Monitor lab/diagnostic results  - Monitor all insertion sites, i e  indwelling lines, tubes, and drains  - Monitor endotracheal if appropriate and nasal secretions for changes in amount and color  - [place] appropriate cooling/warming therapies per order  - Administer medications as ordered  - Instruct and encourage patient and family to use good hand hygiene technique  - Identify and instruct in appropriate isolation precautions for identified infection/condition  Outcome: Progressing  Goal: Absence of fever/infection during neutropenic period  Description: INTERVENTIONS:  - Monitor WBC    Outcome: Progressing

## 2021-07-11 NOTE — ASSESSMENT & PLAN NOTE
· See HPI above  · UA obtained in ED revealed small blood, (-) nitrite, moderate leukocytes, 2-4 RBC, 10-20 WBC and occasional bacteria  · Urine culture pending  · Received initial dose of Cipro in ED   Will continue pending culture results

## 2021-07-11 NOTE — ASSESSMENT & PLAN NOTE
· Reports nausea, vomiting and diarrhea x 1 week  Was recently admitted from 7/2-7/3 for osteitis Lt great toe and sent home on Doxycycline and Flagyl which are now completed  · Afebrile   WBC 12 55  · Enteric panel and C diff pending  · Zofran PRN  · Received 1L NS in ED  · Initiate probiotics  · Encourage PO diet

## 2021-07-11 NOTE — ASSESSMENT & PLAN NOTE
Lab Results   Component Value Date    HGBA1C 8 4 (H) 05/03/2021       Recent Labs     07/10/21  2354   POCGLU 107       Blood Sugar Average: Last 72 hrs:  (P) 107     · Hold home dose Trulicity, Glipizide and Glucophage while inpt  · FSBS AC & HS w SSI  · Diabetic diet

## 2021-07-11 NOTE — H&P
[address] [facility] 51 1990, 27 y o  female MRN: [id number]  Unit/Bed#: ED 16 Encounter: [id number]  Primary Care Provider: Lacey Stout PA-C   Date and time admitted to hospital: 7/10/2021 11:17 PM    * Nausea vomiting and diarrhea  Assessment & Plan  · Reports nausea, vomiting and diarrhea x 1 week  Was recently admitted from 7/2-7/3 for osteitis Lt great toe and sent home on Doxycycline and Flagyl which are now completed  · Afebrile  WBC 12 55  · Enteric panel and C diff pending  · Zofran PRN  · Received 1L NS in ED  · Initiate probiotics  · Encourage PO diet    Acute cystitis without hematuria  Assessment & Plan  · See HPI above  · UA obtained in ED revealed small blood, (-) nitrite, moderate leukocytes, 2-4 RBC, 10-20 WBC and occasional bacteria  · Urine culture pending  · Received initial dose of Cipro in ED  Will continue pending culture results    Diabetic ulcer of toe of left foot associated with type 2 diabetes mellitus [facility])  Assessment & Plan  Lab Results   Component Value Date    HGBA1C 8 4 (H) 05/03/2021       Recent Labs     07/10/21  2354   POCGLU 107       Blood Sugar Average: Last 72 hrs:  (P) 107     · Was admitted 7/2-7/3 for the same  MRI left foot revealed osteitis without osteomyelitis  Was discharged home on course of Doxycyline and Flagyl, which has been completed  States her friend changes her dressing and states the foot looks worse  (+) edema, mild erythema and drainage from wound  Denies fever/chills  · Receiving Cipro for UTI as above   Will hold off on further antibiotics pending podiatry recommendations  · Consult podiatry  · Glucose contro    Hypokalemia  Assessment & Plan  · Potassium 3 2 on admission  · Received K dur 40meq PO in ED  · Repeat BMP tomorrow am    Diabetes [facility])  Assessment & Plan  Lab Results   Component Value Date    HGBA1C 8 4 (H) 05/03/2021       Recent Labs     07/10/21  2354   POCGLU 107       Blood Sugar Average: Last 72 hrs:  (P) 107     · Hold home dose Trulicity, Glipizide and Glucophage while inpt  · FSBS AC & HS w SSI  · Diabetic diet    Seizure disorder (HCC)  Assessment & Plan  · Continue home dose Lamictal    Bipolar 1 disorder (HCC)  Assessment & Plan  · Continue home dose Lamictal, Zoloft and Topamax    Hyperlipidemia  Assessment & Plan  · Continue home dose Lipitor      VTE Prophylaxis: Enoxaparin (Lovenox)  / sequential compression device   Code Status: Level 1 Full Code  POLST: POLST form is not discussed and not completed at this time  Discussion with family: NA    Anticipated Length of Stay:  Patient will be admitted on an Observation basis with an anticipated length of stay of  Less than 2 midnights  Justification for Hospital Stay: See AP above    Total Time for Visit, including Counseling / Coordination of Care: 45 minutes  Greater than 50% of this total time spent on direct patient counseling and coordination of care  Chief Complaint:   N/V/D    History of Present Illness:    Linnea Washington is a 27 y o  female who presents with nausea, vomiting and diarrhea x 1 week  Was recently admitted from 7/2-7/3 for osteitis Lt great toe and sent home on Doxycycline and Flagyl which are now completed  Review of Systems:    Review of Systems   Constitutional: Positive for appetite change (decreased)  Negative for chills and fever  HENT: Negative for congestion, ear pain, sinus pressure and sore throat  Eyes: Negative for visual disturbance  Respiratory: Negative for cough, shortness of breath and wheezing  Cardiovascular: Positive for leg swelling  Negative for chest pain and palpitations  Gastrointestinal: Positive for abdominal pain (lower), diarrhea, nausea and vomiting  Negative for constipation  Genitourinary: Negative for difficulty urinating, dysuria, frequency, hematuria and urgency  Musculoskeletal: Negative for neck pain and neck stiffness     Skin: Positive for wound (Lt great toe)    Neurological: Negative for dizziness, syncope, light-headedness and headaches  All other systems reviewed and are negative  Past Medical and Surgical History:     Past Medical History:   Diagnosis Date    Anxiety     [facility]     Depression     [facility]     [facility]     Psychiatric disorder        Past Surgical History:   Procedure Laterality Date    OTHER SURGICAL HISTORY      Patient states she has surgery on "ear tubes", and bone spur in R hand, and 2nd toe on left foot    TOE AMPUTATION Right 3/16/2021    Procedure: AMPUTATION TOE;  Surgeon: Pretty Stacy DPM;  Location: [facility] OR;  Service: Podiatry    TONSILLECTOMY         Meds/Allergies:    Prior to Admission medications    Medication Sig Start Date End Date Taking?  Authorizing Provider   atorvastatin (LIPITOR) 20 mg tablet Take 20 mg by mouth    Historical Provider, MD   doxycycline (ADOXA) 100 MG tablet Take 1 tablet (100 mg total) by mouth 2 (two) times a day for 7 days 7/3/21 7/10/21  Mame Neumann MD   Dulaglutide (Trulicity) 1 5 UE/0 5SC SOPN Inject 1 5 mg under the skin once a week    Historical Provider, MD   gabapentin (NEURONTIN) 300 mg capsule Take 600 mg by mouth 3 (three) times a day     Historical Provider, MD   glipiZIDE (GLUCOTROL XL) 5 mg 24 hr tablet Take 5 mg by mouth daily    Historical Provider, MD   lamoTRIgine (LaMICtal) 100 mg tablet Take 100 mg by mouth    Historical Provider, MD   metFORMIN (GLUCOPHAGE) 1000 MG tablet Take 1,000 mg by mouth 2 (two) times a day with meals    Historical Provider, MD   metroNIDAZOLE (FLAGYL) 500 mg tablet Take 1 tablet (500 mg total) by mouth every 8 (eight) hours for 7 days 7/3/21 7/10/21  Mame Neumann MD   Mirabegron ER 50 MG TB24 Take 50 mg by mouth    Historical Provider, MD   sertraline (ZOLOFT) 100 mg tablet Take 100 mg by mouth daily    Historical Provider, MD   topiramate (TOPAMAX) 100 mg tablet Take 100 mg by mouth Takes 2 tabs at bed time    Historical Provider, MD     I have reviewed home medications with patient personally  Allergies: Allergies   Allergen Reactions    Amoxicillin Itching    Macrobid [Nitrofurantoin] Itching    Vancomycin Rash       Social History:     Marital Status: Single   Patient Pre-hospital Living Situation: Lives w friends  Patient Pre-hospital Level of Mobility: Ambulatory w cane  Patient Pre-hospital Diet Restrictions: Diabetic  Substance Use History:   Social History     Substance and Sexual Activity   Alcohol Use Never     Social History     Tobacco Use   Smoking Status Current Some Day Smoker    Packs/day: 0 50    Types: Cigarettes    Last attempt to quit: 2020    Years since quittin 5   Smokeless Tobacco Never Used   Tobacco Comment    States quit a few days ago     Social History     Substance and Sexual Activity   Drug Use Never       Family History:    Family History   Family history unknown: Yes       Physical Exam:     Vitals:   Blood Pressure: 97/53 (21)  Pulse: 72 (21)  Temperature: 98 2 °F (36 8 °C) (07/10/21 2211)  Temp Source: Oral (07/10/21 2211)  Respirations: 18 (21)  SpO2: 99 % (21)    Physical Exam  Vitals reviewed  Constitutional:       General: She is not in acute distress  Appearance: She is obese  HENT:      Head: Normocephalic and atraumatic  Mouth/Throat:      Comments: deferred  Eyes:      Extraocular Movements: Extraocular movements intact  Cardiovascular:      Rate and Rhythm: Normal rate and regular rhythm  Pulses: Normal pulses  Heart sounds: Normal heart sounds  Pulmonary:      Effort: Pulmonary effort is normal  No respiratory distress  Breath sounds: Normal breath sounds  No wheezing or rhonchi  Abdominal:      Palpations: Abdomen is soft  Tenderness: There is no abdominal tenderness  There is no guarding or rebound  Musculoskeletal:         General: No tenderness   Normal range of motion  Cervical back: Normal range of motion and neck supple  Left lower leg: Edema present  Skin:     General: Skin is warm and dry  Neurological:      General: No focal deficit present  Mental Status: She is alert and oriented to person, place, and time  Psychiatric:         Mood and Affect: Mood normal          Behavior: Behavior normal          Thought Content: Thought content normal          Additional Data:     Lab Results: I have personally reviewed pertinent reports  Results from last 7 days   Lab Units 07/11/21  0026   WBC Thousand/uL 12 55*   HEMOGLOBIN g/dL 13 7   HEMATOCRIT % 44 3   PLATELETS Thousands/uL 278   NEUTROS PCT % 61   LYMPHS PCT % 29   MONOS PCT % 8   EOS PCT % 1     Results from last 7 days   Lab Units 07/11/21  0026   SODIUM mmol/L 141   POTASSIUM mmol/L 3 2*   CHLORIDE mmol/L 106   CO2 mmol/L 25   BUN mg/dL 11   CREATININE mg/dL 0 80   ANION GAP mmol/L 10   CALCIUM mg/dL 9 3   ALBUMIN g/dL 3 7   TOTAL BILIRUBIN mg/dL 0 44   ALK PHOS U/L 114   ALT U/L 36   AST U/L 18   GLUCOSE RANDOM mg/dL 96         Results from last 7 days   Lab Units 07/10/21  2354   POC GLUCOSE mg/dl 107               Imaging: I have personally reviewed pertinent reports  CT abdomen pelvis with contrast   Final Result by Mey Danielson MD (07/11 0138)      No acute inflammatory process identified within the abdomen or pelvis  Workstation performed: [id number]         XR foot 3+ views LEFT    (Results Pending)       EKG, Pathology, and Other Studies Reviewed on Admission:   · EKG:     Allscripts / Epic Records Reviewed: Yes     ** Please Note: This note has been constructed using a voice recognition system   **

## 2021-07-11 NOTE — ED NOTES
BS was not checked at this time  Accidental clicking off on order of glucose check        Chester Cormier  07/11/21 5230

## 2021-07-11 NOTE — ED PROVIDER NOTES
History  Chief Complaint   Patient presents with    Vomiting     Patient reports nausea, vomiting and abdominal pain x 1 week     Mike Hooper is a 27year-old female with pmhx including diabetes, bipolar disorder, depression and anxiety arriving ambulatory to the ED for evaluation with complaint of generalized abdominal pain, nausea, vomiting, and diarrhea x 1 week  The patient was recently hospitalized through 7/3/21 for osteitis of left great toe, and was discharged on antibiotic course of doxycycline and Flagyl per Infectious Disease recommendations  Patient states that she has continued with nausea, and episodes of nonbloody, nonbilious emesis, as well as non-bloody diarrhea despite completion of antibiotics  Further, the patient is accompanied by her power of , who states that she finds the appearance of the left great toe to be worse when compared to its appearance at time of discharge on 07/03/2021  The patient's poa states that she was previously being followed by [facility] wound care though is in the process finding a new wound care specialist   Because of this, the patient had not been seen in follow-up following hospital discharge  The patient admits that she had been compliant with antibiotic regimen  Patient denies fever, chills, diaphoresis, rigors, or other constitutional complaints  The patient states that she has not been routinely checking her glucose readings as she has had poor oral intake over the past week due to her symptoms  She offers no further complaints at this time  Prior to Admission Medications   Prescriptions Last Dose Informant Patient Reported? Taking?    Dulaglutide (Trulicity) 1 5 OY/8 2XD SOPN  Self Yes No   Sig: Inject 1 5 mg under the skin once a week   Mirabegron ER 50 MG TB24   Yes No   Sig: Take 50 mg by mouth   atorvastatin (LIPITOR) 20 mg tablet   Yes No   Sig: Take 20 mg by mouth   doxycycline (ADOXA) 100 MG tablet   No No   Sig: Take 1 tablet (100 mg total) by mouth 2 (two) times a day for 7 days   gabapentin (NEURONTIN) 300 mg capsule  Self Yes No   Sig: Take 600 mg by mouth 3 (three) times a day    glipiZIDE (GLUCOTROL XL) 5 mg 24 hr tablet   Yes No   Sig: Take 5 mg by mouth daily   lamoTRIgine (LaMICtal) 100 mg tablet   Yes No   Sig: Take 100 mg by mouth   metFORMIN (GLUCOPHAGE) 1000 MG tablet   Yes No   Sig: Take 1,000 mg by mouth 2 (two) times a day with meals   metroNIDAZOLE (FLAGYL) 500 mg tablet   No No   Sig: Take 1 tablet (500 mg total) by mouth every 8 (eight) hours for 7 days   sertraline (ZOLOFT) 100 mg tablet  Self Yes No   Sig: Take 100 mg by mouth daily   topiramate (TOPAMAX) 100 mg tablet   Yes No   Sig: Take 100 mg by mouth Takes 2 tabs at bed time      Facility-Administered Medications: None       Past Medical History:   Diagnosis Date    Anxiety     Bipolar disorder ([facility] )     Depression     Diabetes mellitus ([facility] )     Epilepsy ([facility] )     Psychiatric disorder        Past Surgical History:   Procedure Laterality Date    OTHER SURGICAL HISTORY      Patient states she has surgery on "ear tubes", and bone spur in R hand, and 2nd toe on left foot    TOE AMPUTATION Right 3/16/2021    Procedure: AMPUTATION TOE;  Surgeon: Sheron Vergara DPM;  Location: [facility];  Service: Podiatry    TONSILLECTOMY         History reviewed  No pertinent family history  I have reviewed and agree with the history as documented      E-Cigarette/Vaping    E-Cigarette Use Never User      E-Cigarette/Vaping Substances    Nicotine No      Social History     Tobacco Use    Smoking status: Current Some Day Smoker     Packs/day: 0 50     Types: Cigarettes     Last attempt to quit: 2020     Years since quittin 5    Smokeless tobacco: Never Used    Tobacco comment: Pt reports smoking aprox 1 pk of cigarettes daily   Vaping Use    Vaping Use: Never used   Substance Use Topics    Alcohol use: Never    Drug use: Never       Review of Systems Constitutional: Negative for chills, diaphoresis, fatigue and fever  Respiratory: Negative for shortness of breath  Cardiovascular: Negative for chest pain  Gastrointestinal: Positive for abdominal pain, diarrhea, nausea and vomiting  Skin: Negative for pallor and rash  Neurological: Negative for weakness  All other systems reviewed and are negative  Physical Exam  Physical Exam  Vitals and nursing note reviewed  Constitutional:       General: She is not in acute distress  Appearance: Normal appearance  She is well-developed  She is obese  She is not ill-appearing, toxic-appearing or diaphoretic  HENT:      Head: Normocephalic and atraumatic  Eyes:      Conjunctiva/sclera: Conjunctivae normal       Pupils: Pupils are equal, round, and reactive to light  Cardiovascular:      Rate and Rhythm: Normal rate and regular rhythm  Pulses: Normal pulses  Pulmonary:      Effort: Pulmonary effort is normal  No respiratory distress  Breath sounds: Normal breath sounds  No wheezing  Abdominal:      General: Bowel sounds are normal  There is no distension  Palpations: Abdomen is soft  Tenderness: There is no abdominal tenderness  There is no guarding or rebound  Comments: Abdomen soft, and nondistended with no reproducible abdominal tenderness to palpation   Musculoskeletal:         General: Normal range of motion  Cervical back: Normal range of motion and neck supple  Skin:     General: Skin is warm and dry  Capillary Refill: Capillary refill takes less than 2 seconds  Comments: Diabetic foot ulcer, left great toe  Please refer to photo posted below  Neurological:      General: No focal deficit present  Mental Status: She is alert  Sensory: Sensation is intact  No sensory deficit  Motor: Motor function is intact  No weakness  Gait: Gait is intact   Gait normal    Psychiatric:         Mood and Affect: Mood normal  Vital Signs  ED Triage Vitals [07/10/21 2211]   Temperature Pulse Respirations Blood Pressure SpO2   98 2 °F (36 8 °C) 78 18 126/69 97 %      Temp Source Heart Rate Source Patient Position - Orthostatic VS BP Location FiO2 (%)   Oral Monitor Sitting Right arm --      Pain Score       --           Vitals:    07/10/21 2211   BP: 126/69   Pulse: 78   Patient Position - Orthostatic VS: Sitting         Visual Acuity      ED Medications  Medications - No data to display    Diagnostic Studies  Results Reviewed     Procedure Component Value Units Date/Time    Urine culture [[id number]]  (Abnormal)  (Susceptibility) Collected: 07/11/21 0349    Lab Status: Final result Specimen: Urine, Other Updated: 07/13/21 1514     Urine Culture 10,000-19,000 cfu/ml Candida albicans      <10,000 cfu/ml     Susceptibility     Candida albicans (1)     Antibiotic Interpretation Microscan Method Status    ZID Performed  Yes ORION Final                   Stool Enteric Bacterial Panel by PCR [[id number]]  (Normal) Collected: 07/11/21 0930    Lab Status: Final result Specimen: Stool from Rectum Updated: 07/12/21 1512     Salmonella sp PCR None Detected     Shigella sp/Enteroinvasive E  coli (EIEC) PCR None Detected     Campylobacter sp (jejuni and coli) PCR None Detected     Shiga toxin 1/Shiga toxin 2 genes PCR None Detected    Clostridium difficile toxin by PCR with EIA [[id number]]  (Normal) Collected: 07/11/21 0930    Lab Status: Final result Specimen: Stool from Rectum Updated: 07/12/21 1414      C difficile toxin by PCR  Negative    Narrative:           Basic metabolic panel [[id number]]  (Abnormal) Collected: 07/12/21 0454    Lab Status: Final result Specimen: Blood from Arm, Left Updated: 07/12/21 0540     Sodium 144 mmol/L      Potassium 3 2 mmol/L      Chloride 110 mmol/L      CO2 25 mmol/L      ANION GAP 9 mmol/L      BUN 6 mg/dL      Creatinine 0 64 mg/dL      Glucose 128 mg/dL      Calcium 8 5 mg/dL      eGFR 120 ml/min/1 73sq m     Narrative:      [employer] guidelines for Chronic Kidney Disease (CKD):     Stage 1 with normal or high GFR (GFR > 90 mL/min/1 73 square meters)    Stage 2 Mild CKD (GFR = 60-89 mL/min/1 73 square meters)    Stage 3A Moderate CKD (GFR = 45-59 mL/min/1 73 square meters)    Stage 3B Moderate CKD (GFR = 30-44 mL/min/1 73 square meters)    Stage 4 Severe CKD (GFR = 15-29 mL/min/1 73 square meters)    Stage 5 End Stage CKD (GFR <15 mL/min/1 73 square meters)  Note: GFR calculation is accurate only with a steady state creatinine    CBC and differential [[id number]]  (Abnormal) Collected: 07/12/21 0454    Lab Status: Final result Specimen: Blood from Arm, Left Updated: 07/12/21 0512     WBC 6 26 Thousand/uL      RBC 4 57 Million/uL      Hemoglobin 12 2 g/dL      Hematocrit 39 4 %      MCV 86 fL      MCH 26 7 pg      MCHC 31 0 g/dL      RDW 14 8 %      MPV 10 5 fL      Platelets 559 Thousands/uL      nRBC 0 /100 WBCs      Neutrophils Relative 42 %      Immat GRANS % 1 %      Lymphocytes Relative 46 %      Monocytes Relative 9 %      Eosinophils Relative 1 %      Basophils Relative 1 %      Neutrophils Absolute 2 63 Thousands/µL      Immature Grans Absolute 0 03 Thousand/uL      Lymphocytes Absolute 2 96 Thousands/µL      Monocytes Absolute 0 54 Thousand/µL      Eosinophils Absolute 0 07 Thousand/µL      Basophils Absolute 0 03 Thousands/µL     Fingerstick Glucose (POCT) [[id number]]  (Abnormal) Collected: 07/11/21 1118    Lab Status: Final result Updated: 07/11/21 1119     POC Glucose 181 mg/dl     Fingerstick Glucose (POCT) [[id number]]  (Normal) Collected: 07/11/21 0718    Lab Status: Final result Updated: 07/11/21 0719     POC Glucose 115 mg/dl     Urine Microscopic [[id number]]  (Abnormal) Collected: 07/11/21 0349    Lab Status: Final result Specimen: Urine, Other Updated: 07/11/21 0405     RBC, UA 2-4 /hpf      WBC, UA 10-20 /hpf      Epithelial Cells Moderate /hpf Bacteria, UA Occasional /hpf      OTHER OBSERVATIONS Yeast Cells Present    UA w Reflex to Microscopic w Reflex to Culture [[id number]]  (Abnormal) Collected: 07/11/21 0349    Lab Status: Final result Specimen: Urine, Other Updated: 07/11/21 0355     Color, UA Yellow     Clarity, UA Clear     Specific Gravity, UA <=1 005     pH, UA 6 0     Leukocytes, UA Moderate     Nitrite, UA Negative     Protein, UA Trace mg/dl      Glucose, UA Negative mg/dl      Ketones, UA Negative mg/dl      Urobilinogen, UA 0 2 E U /dl      Bilirubin, UA Small     Blood, UA Small    Lipase [[id number]]  (Abnormal) Collected: 07/11/21 0026    Lab Status: Final result Specimen: Blood from Arm, Right Updated: 07/11/21 0057     Lipase 51 u/L     Basic metabolic panel [[id number]]  (Abnormal) Collected: 07/11/21 0026    Lab Status: Final result Specimen: Blood from Arm, Right Updated: 07/11/21 0057     Sodium 141 mmol/L      Potassium 3 2 mmol/L      Chloride 106 mmol/L      CO2 25 mmol/L      ANION GAP 10 mmol/L      BUN 11 mg/dL      Creatinine 0 80 mg/dL      Glucose 96 mg/dL      Calcium 9 3 mg/dL      eGFR 99 ml/min/1 73sq m     Narrative:      [place] guidelines for Chronic Kidney Disease (CKD):     Stage 1 with normal or high GFR (GFR > 90 mL/min/1 73 square meters)    Stage 2 Mild CKD (GFR = 60-89 mL/min/1 73 square meters)    Stage 3A Moderate CKD (GFR = 45-59 mL/min/1 73 square meters)    Stage 3B Moderate CKD (GFR = 30-44 mL/min/1 73 square meters)    Stage 4 Severe CKD (GFR = 15-29 mL/min/1 73 square meters)    Stage 5 End Stage CKD (GFR <15 mL/min/1 73 square meters)  Note: GFR calculation is accurate only with a steady state creatinine    Hepatic function panel [[id number]]  (Normal) Collected: 07/11/21 0026    Lab Status: Final result Specimen: Blood from Arm, Right Updated: 07/11/21 0057     Total Bilirubin 0 44 mg/dL      Bilirubin, Direct 0 11 mg/dL      Alkaline Phosphatase 114 U/L      AST 18 U/L ALT 36 U/L      Total Protein 8 0 g/dL      Albumin 3 7 g/dL     Beta Hydroxybutyrate [[id number]]  (Normal) Collected: 07/11/21 0026    Lab Status: Final result Specimen: Blood from Arm, Right Updated: 07/11/21 0043     BETA-HYDROXYBUTYRATE 0 4 mmol/L     Blood gas, venous [[id number]]  (Abnormal) Collected: 07/11/21 0026    Lab Status: Final result Specimen: Blood from Arm, Right Updated: 07/11/21 0038     pH, Forrest 7 306     pCO2, Forrest 49 0 mm Hg      pO2, Forrest 23 6 mm Hg      HCO3, Forrest 23 9 mmol/L      Base Excess, Forrest -2 9 mmol/L      O2 Content, Forrest 7 9 ml/dL      O2 HGB, VENOUS 37 9 %     CBC and differential [[id number]]  (Abnormal) Collected: 07/11/21 0026    Lab Status: Final result Specimen: Blood from Arm, Right Updated: 07/11/21 0038     WBC 12 55 Thousand/uL      RBC 5 13 Million/uL      Hemoglobin 13 7 g/dL      Hematocrit 44 3 %      MCV 86 fL      MCH 26 7 pg      MCHC 30 9 g/dL      RDW 14 7 %      MPV 10 4 fL      Platelets 180 Thousands/uL      nRBC 0 /100 WBCs      Neutrophils Relative 61 %      Immat GRANS % 1 %      Lymphocytes Relative 29 %      Monocytes Relative 8 %      Eosinophils Relative 1 %      Basophils Relative 0 %      Neutrophils Absolute 7 70 Thousands/µL      Immature Grans Absolute 0 06 Thousand/uL      Lymphocytes Absolute 3 69 Thousands/µL      Monocytes Absolute 0 95 Thousand/µL      Eosinophils Absolute 0 11 Thousand/µL      Basophils Absolute 0 04 Thousands/µL     Fingerstick Glucose (POCT) [[id number]]  (Normal) Collected: 07/10/21 2354    Lab Status: Final result Updated: 07/10/21 2355     POC Glucose 107 mg/dl                  CT abdomen pelvis with contrast   Final Result by Brooklyn Polanco MD (07/11 0138)      No acute inflammatory process identified within the abdomen or pelvis  Workstation performed: [id number]         XR foot 3+ views LEFT   Final Result by Deb Cornelius MD (07/11 1935)      No acute osseous abnormality    No radiographic evidence of osteomyelitis  Workstation performed: [id number]                    Procedures  Procedures         ED Course                                           MDM  Number of Diagnoses or Management Options  Diabetic ulcer of left great toe (HCC)  Nausea vomiting and diarrhea: new and requires workup  Diagnosis management comments: This is a 27year old female with past medical history including diabetes, with recent hospital admission for osteitis of left great toe, and discharge on 07/03/2021 on antibiotic course, returning to the emergency department for evaluation with chief complaint of nausea, vomiting, and diarrhea x1 week  Patient reports numerous episodes of nonbloody, nonbilious emesis as well as nonbloody stools  The patient states that she has mild generalized abdominal discomfort  She denies fever, chills, diaphoresis  Additionally, patient's POA at bedside stating that she finds the appearance of the left great toe has worsened since prior admission  Patient denies abnormal bleeding or drainage from the toe  She reports compliance with antibiotic regimen  Patient offers no further complaints at this time  The patient has no pertinent surgical history  Differential diagnosis includes but not limited to:  Assess for evidence of diabetic ketoacidosis, gastroenteritis, enteritis, colitis, adverse reaction to antibiotic regimen, rule out C diff colitis; cellulitis, diabetic ulcer, osteitis, doubt acute osteomyelitis given recent completion of antibiotics    Initial ED plan:  Labs, imaging, stool studies    Final ED Assessment:  Vital signs stable on hospital arrival, patient afebrile and nontoxic in appearance  Examination as documented above  The patient's abdominal exam is benign  All labs and imaging independently reviewed with imaging interpreted by the radiologist   CBC demonstrates leukocytosis of 12 55  There is no laboratory evidence of diabetic ketoacidosis    K 3 2 which was repleted orally and well tolerated by the patient  Urinalysis demonstrates moderate leukocytosis, culture in process at time of admission  Cipro administered in the emergency department  CT abdomen/pelvis reports no acute inflammatory process identified in the abdomen or pelvis  Stool studies obtained and are in process  Given recent history osteitis of the left great toe, possibility regarding the diagnosis of C diff colitis, and concern for patient's ability to tolerate oral medications, recommended hospital admission for IV antibiotics/hydration as indicated as well as podiatry consultation which the patient is understanding and agreeable with  Case had been discussed with LEXII Bush  Patient accepted to Medicine Service  She had remained hemodynamically stable while being observed in the emergency department  She is stable for hospital admission  Bridging orders placed           Amount and/or Complexity of Data Reviewed  Clinical lab tests: ordered and reviewed  Tests in the radiology section of CPT®: ordered and reviewed  Review and summarize past medical records: yes  Discuss the patient with other providers: yes (SLIM)  Independent visualization of images, tracings, or specimens: yes    Risk of Complications, Morbidity, and/or Mortality  Presenting problems: moderate  Diagnostic procedures: moderate  Management options: moderate    Patient Progress  Patient progress: stable      Disposition  Final diagnoses:   Nausea vomiting and diarrhea   Diabetic ulcer of left great toe ([address] )     Time reflects when diagnosis was documented in both MDM as applicable and the Disposition within this note     Time User Action Codes Description Comment    7/11/2021  6:17 AM Paula Muller Add [R11 2,  R19 7] Nausea vomiting and diarrhea     7/11/2021  6:19 AM Paula Muller Add [E11 691,  L97 267] Diabetic ulcer of left great toe [facility])       ED Disposition     ED Disposition Condition Date/Time Comment    Admit Stable Sun Jul 11, 2021  5:22 AM Case was discussed with Mariano Corado PA-C and the patient's admission status was agreed to be Admission Status: observation status to the service of Dr Ricardo Fontana   Follow-up Information     Follow up With Specialties Details Why Contact Info    Chanell Peter PA-C Physician Assistant Schedule an appointment as soon as possible for a visit in 1 week(s)  [address] [phone] [address] [facility], [facility] Podiatry Schedule an appointment as soon as possible for a visit  [address] [phone]            Patient's Medications   Discharge Prescriptions    No medications on file     No discharge procedures on file      PDMP Review     None          ED Provider  Electronically Signed by           Miesha Shelton PA-C  07/14/21 6558

## 2021-07-11 NOTE — ASSESSMENT & PLAN NOTE
Lab Results   Component Value Date    HGBA1C 8 4 (H) 05/03/2021       Recent Labs     07/10/21  2354   POCGLU 107       Blood Sugar Average: Last 72 hrs:  (P) 107     · Was admitted 7/2-7/3 for the same  MRI left foot revealed osteitis without osteomyelitis  Was discharged home on course of Doxycyline and Flagyl, which has been completed  States her friend changes her dressing and states the foot looks worse  (+) edema, mild erythema and drainage from wound  Denies fever/chills  · Receiving Cipro for UTI as above   Will hold off on further antibiotics pending podiatry recommendations  · Consult podiatry  · Glucose contro

## 2021-07-12 VITALS
TEMPERATURE: 98.3 F | SYSTOLIC BLOOD PRESSURE: 105 MMHG | BODY MASS INDEX: 43.17 KG/M2 | OXYGEN SATURATION: 94 % | HEART RATE: 78 BPM | DIASTOLIC BLOOD PRESSURE: 70 MMHG | HEIGHT: 64 IN | RESPIRATION RATE: 17 BRPM | WEIGHT: 252.87 LBS

## 2021-07-12 DIAGNOSIS — Z71.89 COMPLEX CARE COORDINATION: Primary | ICD-10-CM

## 2021-07-12 LAB
ANION GAP SERPL CALCULATED.3IONS-SCNC: 9 MMOL/L (ref 4–13)
BASOPHILS # BLD AUTO: 0.03 THOUSANDS/ΜL (ref 0–0.1)
BASOPHILS NFR BLD AUTO: 1 % (ref 0–1)
BUN SERPL-MCNC: 6 MG/DL (ref 5–25)
C DIFF TOX B TCDB STL QL NAA+PROBE: NEGATIVE
CALCIUM SERPL-MCNC: 8.5 MG/DL (ref 8.3–10.1)
CAMPYLOBACTER DNA SPEC NAA+PROBE: NORMAL
CHLORIDE SERPL-SCNC: 110 MMOL/L (ref 100–108)
CO2 SERPL-SCNC: 25 MMOL/L (ref 21–32)
CREAT SERPL-MCNC: 0.64 MG/DL (ref 0.6–1.3)
EOSINOPHIL # BLD AUTO: 0.07 THOUSAND/ΜL (ref 0–0.61)
EOSINOPHIL NFR BLD AUTO: 1 % (ref 0–6)
ERYTHROCYTE [DISTWIDTH] IN BLOOD BY AUTOMATED COUNT: 14.8 % (ref 11.6–15.1)
GFR SERPL CREATININE-BSD FRML MDRD: 120 ML/MIN/1.73SQ M
GLUCOSE SERPL-MCNC: 128 MG/DL (ref 65–140)
GLUCOSE SERPL-MCNC: 167 MG/DL (ref 65–140)
GLUCOSE SERPL-MCNC: 167 MG/DL (ref 65–140)
HCT VFR BLD AUTO: 39.4 % (ref 34.8–46.1)
HGB BLD-MCNC: 12.2 G/DL (ref 11.5–15.4)
IMM GRANULOCYTES # BLD AUTO: 0.03 THOUSAND/UL (ref 0–0.2)
IMM GRANULOCYTES NFR BLD AUTO: 1 % (ref 0–2)
LYMPHOCYTES # BLD AUTO: 2.96 THOUSANDS/ΜL (ref 0.6–4.47)
LYMPHOCYTES NFR BLD AUTO: 46 % (ref 14–44)
MCH RBC QN AUTO: 26.7 PG (ref 26.8–34.3)
MCHC RBC AUTO-ENTMCNC: 31 G/DL (ref 31.4–37.4)
MCV RBC AUTO: 86 FL (ref 82–98)
MONOCYTES # BLD AUTO: 0.54 THOUSAND/ΜL (ref 0.17–1.22)
MONOCYTES NFR BLD AUTO: 9 % (ref 4–12)
NEUTROPHILS # BLD AUTO: 2.63 THOUSANDS/ΜL (ref 1.85–7.62)
NEUTS SEG NFR BLD AUTO: 42 % (ref 43–75)
NRBC BLD AUTO-RTO: 0 /100 WBCS
PLATELET # BLD AUTO: 199 THOUSANDS/UL (ref 149–390)
PMV BLD AUTO: 10.5 FL (ref 8.9–12.7)
POTASSIUM SERPL-SCNC: 3.2 MMOL/L (ref 3.5–5.3)
RBC # BLD AUTO: 4.57 MILLION/UL (ref 3.81–5.12)
SALMONELLA DNA SPEC QL NAA+PROBE: NORMAL
SHIGA TOXIN STX GENE SPEC NAA+PROBE: NORMAL
SHIGELLA DNA SPEC QL NAA+PROBE: NORMAL
SODIUM SERPL-SCNC: 144 MMOL/L (ref 136–145)
WBC # BLD AUTO: 6.26 THOUSAND/UL (ref 4.31–10.16)

## 2021-07-12 PROCEDURE — 85025 COMPLETE CBC W/AUTO DIFF WBC: CPT | Performed by: INTERNAL MEDICINE

## 2021-07-12 PROCEDURE — 80048 BASIC METABOLIC PNL TOTAL CA: CPT | Performed by: INTERNAL MEDICINE

## 2021-07-12 PROCEDURE — 99239 HOSP IP/OBS DSCHRG MGMT >30: CPT | Performed by: INTERNAL MEDICINE

## 2021-07-12 PROCEDURE — 82948 REAGENT STRIP/BLOOD GLUCOSE: CPT

## 2021-07-12 RX ORDER — POTASSIUM CHLORIDE 20 MEQ/1
40 TABLET, EXTENDED RELEASE ORAL 2 TIMES DAILY
Status: DISCONTINUED | OUTPATIENT
Start: 2021-07-12 | End: 2021-07-12 | Stop reason: HOSPADM

## 2021-07-12 RX ORDER — POTASSIUM CHLORIDE 20 MEQ/1
40 TABLET, EXTENDED RELEASE ORAL 2 TIMES DAILY
Status: DISCONTINUED | OUTPATIENT
Start: 2021-07-12 | End: 2021-07-12

## 2021-07-12 RX ORDER — SACCHAROMYCES BOULARDII 250 MG
250 CAPSULE ORAL 2 TIMES DAILY
Qty: 10 CAPSULE | Refills: 0 | Status: SHIPPED | OUTPATIENT
Start: 2021-07-12

## 2021-07-12 RX ADMIN — CEFTRIAXONE SODIUM 1000 MG: 10 INJECTION, POWDER, FOR SOLUTION INTRAVENOUS at 08:34

## 2021-07-12 RX ADMIN — CIPROFLOXACIN 400 MG: 2 INJECTION, SOLUTION INTRAVENOUS at 05:43

## 2021-07-12 RX ADMIN — INSULIN LISPRO 2 UNITS: 100 INJECTION, SOLUTION INTRAVENOUS; SUBCUTANEOUS at 11:54

## 2021-07-12 RX ADMIN — GABAPENTIN 600 MG: 300 CAPSULE ORAL at 08:28

## 2021-07-12 RX ADMIN — POTASSIUM CHLORIDE 40 MEQ: 1500 TABLET, EXTENDED RELEASE ORAL at 08:28

## 2021-07-12 RX ADMIN — OXYBUTYNIN CHLORIDE 10 MG: 5 TABLET, EXTENDED RELEASE ORAL at 08:28

## 2021-07-12 RX ADMIN — Medication 250 MG: at 08:28

## 2021-07-12 RX ADMIN — LAMOTRIGINE 100 MG: 100 TABLET ORAL at 08:28

## 2021-07-12 RX ADMIN — INSULIN LISPRO 2 UNITS: 100 INJECTION, SOLUTION INTRAVENOUS; SUBCUTANEOUS at 08:28

## 2021-07-12 RX ADMIN — ENOXAPARIN SODIUM 40 MG: 40 INJECTION SUBCUTANEOUS at 08:28

## 2021-07-12 NOTE — ASSESSMENT & PLAN NOTE
Lab Results   Component Value Date    HGBA1C 8 4 (H) 05/03/2021       Recent Labs     07/11/21  1118 07/11/21  1548 07/11/21 2056 07/12/21  0659   POCGLU 181* 139 132 167*       Blood Sugar Average: Last 72 hrs:  (P) 517 [id number]     · Was admitted 7/2-7/3 for the same  MRI left foot revealed osteitis without osteomyelitis   Podiatry consult appreciated  · Recommended local wound care and outpatient follow-up  · Recommend glucose control

## 2021-07-12 NOTE — PLAN OF CARE
Problem: Potential for Falls  Goal: Patient will remain free of falls  Description: INTERVENTIONS:  - Educate patient/family on patient safety including physical limitations  - Instruct patient to call for assistance with activity   - Consult OT/PT to assist with strengthening/mobility   - Keep Call bell within reach  - Keep bed low and locked with side rails adjusted as appropriate  - Keep care items and personal belongings within reach  - Initiate and maintain comfort rounds  - Make Fall Risk Sign visible to staff  - Offer Toileting every  Hours, in advance of need  - Initiate/Maintain alarm  - Obtain necessary fall risk management equipment:   - Apply yellow socks and bracelet for high fall risk patients  - Consider moving patient to room near nurses station  Outcome: Adequate for Discharge     Problem: PAIN - ADULT  Goal: Verbalizes/displays adequate comfort level or baseline comfort level  Description: Interventions:  - Encourage patient to monitor pain and request assistance  - Assess pain using appropriate pain scale  - Administer analgesics based on type and severity of pain and evaluate response  - Implement non-pharmacological measures as appropriate and evaluate response  - Consider cultural and social influences on pain and pain management  - Notify physician/advanced practitioner if interventions unsuccessful or patient reports new pain  Outcome: Adequate for Discharge     Problem: INFECTION - ADULT  Goal: Absence or prevention of progression during hospitalization  Description: INTERVENTIONS:  - Assess and monitor for signs and symptoms of infection  - Monitor lab/diagnostic results  - Monitor all insertion sites, i e  indwelling lines, tubes, and drains  - Monitor endotracheal if appropriate and nasal secretions for changes in amount and color  - [place] appropriate cooling/warming therapies per order  - Administer medications as ordered  - Instruct and encourage patient and family to use good hand hygiene technique  - Identify and instruct in appropriate isolation precautions for identified infection/condition  Outcome: Adequate for Discharge  Goal: Absence of fever/infection during neutropenic period  Description: INTERVENTIONS:  - Monitor WBC    Outcome: Adequate for Discharge     Problem: SAFETY ADULT  Goal: Patient will remain free of falls  Description: INTERVENTIONS:  - Educate patient/family on patient safety including physical limitations  - Instruct patient to call for assistance with activity   - Consult OT/PT to assist with strengthening/mobility   - Keep Call bell within reach  - Keep bed low and locked with side rails adjusted as appropriate  - Keep care items and personal belongings within reach  - Initiate and maintain comfort rounds  - Make Fall Risk Sign visible to staff  - Offer Toileting every  Hours, in advance of need  - Initiate/Maintain alarm  - Obtain necessary fall risk management equipment:   - Apply yellow socks and bracelet for high fall risk patients  - Consider moving patient to room near nurses station  Outcome: Adequate for Discharge  Goal: Maintain or return to baseline ADL function  Description: INTERVENTIONS:  -  Assess patient's ability to carry out ADLs; assess patient's baseline for ADL function and identify physical deficits which impact ability to perform ADLs (bathing, care of mouth/teeth, toileting, grooming, dressing, etc )  - Assess/evaluate cause of self-care deficits   - Assess range of motion  - Assess patient's mobility; develop plan if impaired  - Assess patient's need for assistive devices and provide as appropriate  - Encourage maximum independence but intervene and supervise when necessary  - Involve family in performance of ADLs  - Assess for home care needs following discharge   - Consider OT consult to assist with ADL evaluation and planning for discharge  - Provide patient education as appropriate  Outcome: Adequate for Discharge  Goal: Maintains/Returns to pre admission functional level  Description: INTERVENTIONS:  - Perform BMAT or MOVE assessment daily    - Set and communicate daily mobility goal to care team and patient/family/caregiver  - Collaborate with rehabilitation services on mobility goals if consulted  - Perform Range of Motion  times a day  - Reposition patient every  hours  - Dangle patient  times a day  - Stand patient  times a day  - Ambulate patient  times a day  - Out of bed to chair  times a day   - Out of bed for meals times a day  - Out of bed for toileting  - Record patient progress and toleration of activity level   Outcome: Adequate for Discharge     Problem: DISCHARGE PLANNING  Goal: Discharge to home or other facility with appropriate resources  Description: INTERVENTIONS:  - Identify barriers to discharge w/patient and caregiver  - Arrange for needed discharge resources and transportation as appropriate  - Identify discharge learning needs (meds, wound care, etc )  - Arrange for interpretive services to assist at discharge as needed  - Refer to Case Management Department for coordinating discharge planning if the patient needs post-hospital services based on physician/advanced practitioner order or complex needs related to functional status, cognitive ability, or social support system  Outcome: Adequate for Discharge     Problem: Knowledge Deficit  Goal: Patient/family/caregiver demonstrates understanding of disease process, treatment plan, medications, and discharge instructions  Description: Complete learning assessment and assess knowledge base    Interventions:  - Provide teaching at level of understanding  - Provide teaching via preferred learning methods  Outcome: Adequate for Discharge     Problem: Nutrition/Hydration-ADULT  Goal: Nutrient/Hydration intake appropriate for improving, restoring or maintaining nutritional needs  Description: Monitor and assess patient's nutrition/hydration status for malnutrition  Collaborate with interdisciplinary team and initiate plan and interventions as ordered  Monitor patient's weight and dietary intake as ordered or per policy  Utilize nutrition screening tool and intervene as necessary  Determine patient's food preferences and provide high-protein, high-caloric foods as appropriate       INTERVENTIONS:  - Monitor oral intake, urinary output, labs, and treatment plans  - Assess nutrition and hydration status and recommend course of action  - Evaluate amount of meals eaten  - Assist patient with eating if necessary   - Allow adequate time for meals  - Recommend/ encourage appropriate diets, oral nutritional supplements, and vitamin/mineral supplements  - Order, calculate, and assess calorie counts as needed  - Recommend, monitor, and adjust tube feedings and TPN/PPN based on assessed needs  - Assess need for intravenous fluids  - Provide specific nutrition/hydration education as appropriate  - Include patient/family/caregiver in decisions related to nutrition  Outcome: Adequate for Discharge     Problem: Prexisting or High Potential for Compromised Skin Integrity  Goal: Skin integrity is maintained or improved  Description: INTERVENTIONS:  - Identify patients at risk for skin breakdown  - Assess and monitor skin integrity  - Assess and monitor nutrition and hydration status  - Monitor labs   - Assess for incontinence   - Turn and reposition patient  - Assist with mobility/ambulation  - Relieve pressure over bony prominences  - Avoid friction and shearing  - Provide appropriate hygiene as needed including keeping skin clean and dry  - Evaluate need for skin moisturizer/barrier cream  - Collaborate with interdisciplinary team   - Patient/family teaching  - Consider wound care consult   Outcome: Adequate for Discharge

## 2021-07-12 NOTE — ASSESSMENT & PLAN NOTE
· See HPI above  · UA obtained in ED revealed small blood, (-) nitrite, moderate leukocytes, 2-4 RBC, 10-20 WBC and occasional bacteria  · Urine culture pending

## 2021-07-12 NOTE — ASSESSMENT & PLAN NOTE
Lab Results   Component Value Date    HGBA1C 8 4 (H) 05/03/2021       Recent Labs     07/11/21  1118 07/11/21  1548 07/11/21 2056 07/12/21  0659   POCGLU 181* 139 132 167*       Blood Sugar Average: Last 72 hrs:  (P) 717 [id number]     · Hold home dose Trulicity, Glipizide and Glucophage while inpt  · FSBS AC & HS w SSI  · Diabetic diet

## 2021-07-12 NOTE — NURSING NOTE
Patient d/c with all belongings including clothing, cell,  and personal items  Left in stable condition  D/C instructions explained and patient was educated on life syle changes and well as importance in skin care and following up with MD for wounds  Transported home by car with 2 friends

## 2021-07-12 NOTE — DISCHARGE SUMMARY
[address] [facility]  Discharge- Viveca Sing 1990, 27 y o  female MRN: [id number]  Unit/Bed#: [facility] Encounter: [id number]  Primary Care Provider: Avery Iverson PA-C   Date and time admitted to hospital: 7/10/2021 11:17 PM    * Nausea vomiting and diarrhea  Assessment & Plan  · Reports nausea, vomiting and diarrhea x 1 week  · CT of the abdomen and pelvis  IMPRESSION:  No acute inflammatory process identified within the abdomen or pelvis  Patient's symptoms are likely viral gastroenteritis  Her symptoms have resolved  Able to tolerate diet well  · Enteric pane pending  · C diff negative  · Clinically hemodynamically stable  · Zofran p r n  Diabetic ulcer of toe of left foot associated with type 2 diabetes mellitus [facility])  Assessment & Plan  Lab Results   Component Value Date    HGBA1C 8 4 (H) 05/03/2021       Recent Labs     07/11/21  1118 07/11/21  1548 07/11/21 2056 07/12/21  0659   POCGLU 181* 139 132 167*       Blood Sugar Average: Last 72 hrs:  (P) 534 [id number]     · Was admitted 7/2-7/3 for the same  MRI left foot revealed osteitis without osteomyelitis   Podiatry consult appreciated  · Recommended local wound care and outpatient follow-up  · Recommend glucose control    Acute cystitis without hematuria  Assessment & Plan  · See HPI above  · UA obtained in ED revealed small blood, (-) nitrite, moderate leukocytes, 2-4 RBC, 10-20 WBC and occasional bacteria  · Urine culture pending      Hypokalemia  Assessment & Plan  · Potassium 3 2 this morning  · Replete and monitor    Bipolar 1 disorder ([facility])  Assessment & Plan  · Continue home dose Lamictal, Zoloft and Topamax    Seizure disorder ([facility] [address] )  Assessment & Plan  · Continue home dose Lamictal    Diabetes [facility])  Assessment & Plan  Lab Results   Component Value Date    HGBA1C 8 4 (H) 05/03/2021       Recent Labs     07/11/21  1118 07/11/21  1548 07/11/21 2056 07/12/21  0659   POCGLU 181* 139 132 167*       Blood Sugar Average: Last 72 hrs:  (P) 166 [id number]     · Hold home dose Trulicity, Glipizide and Glucophage while inpt  · FSBS AC & HS w SSI  · Diabetic diet    Hyperlipidemia  Assessment & Plan  · Continue home dose Lipitor      Discharging Physician / Practitioner: Missy Barrera MD  PCP: Ji Bradley PA-C  Admission Date:   Admission Orders (From admission, onward)     Ordered        07/11/21 1222  Inpatient Admission  Once         07/11/21 0523  Place in Observation  Once                   Discharge Date: 07/12/21    Medical Problems     Resolved Problems  Date Reviewed: 7/11/2021    None                Consultations During Hospital Stay:  · Podiatry    Procedures Performed:   · None    Significant Findings / Test Results:   · CT of the abdomen and pelvis   IMPRESSION:     No acute inflammatory process identified within the abdomen or pelvis  Incidental Findings:   ·     Test Results Pending at Discharge (will require follow up): · Stool Enteric panel to follow up with PCP regarding the results     Outpatient Tests Requested:  ·     Complications:  None    Reason for Admission:    Hospital Course:     Neeraj Joyner is a 27 y o  female patient with morbid obesity, bipolar disorder, type 2 diabetes who originally presented to the hospital on 7/10/2021 due to nausea, vomiting and diarrhea  She was recently admitted to the hospital from 07/02 to 7/3 for foot infection, at the time MRI of foot revealed osteitis without osteomyelitis  Patient was discharged home with antibiotics for which she only completed the treatment  She was admitted for GI symptoms, was managed symptomatically  C diff was negative  On the day of discharge patient is clinically hemodynamically stable  Was able to tolerate diet well with no issues    Podiatry evaluated patient this morning, recommending to continue local wound care and outpatient follow-up after discharge    Please see above list of diagnoses and related plan for additional information  Condition at Discharge: stable     Discharge Day Visit / Exam:     Subjective:  Patient seen and examined  No acute complaints at this time  Vitals: Blood Pressure: 105/70 (07/12/21 0701)  Pulse: 78 (07/12/21 0701)  Temperature: 98 3 °F (36 8 °C) (07/12/21 0701)  Temp Source: Oral (07/11/21 1547)  Respirations: 17 (07/12/21 0701)  Height: 5' 4" (162 6 cm) (07/11/21 1547)  Weight - Scale: 115 kg (252 lb 13 9 oz) (07/11/21 1533)  SpO2: 94 % (07/12/21 0701)  Exam:   Physical Exam  Vitals and nursing note reviewed  Constitutional:       Appearance: Normal appearance  Cardiovascular:      Rate and Rhythm: Normal rate and regular rhythm  Pulses: Normal pulses  Heart sounds: Normal heart sounds  Pulmonary:      Effort: Pulmonary effort is normal  No respiratory distress  Breath sounds: Normal breath sounds  Musculoskeletal:         General: Normal range of motion  Cervical back: Normal range of motion  Skin:     General: Skin is warm  Neurological:      General: No focal deficit present  Mental Status: She is alert and oriented to person, place, and time  Discussion with Family:     Discharge instructions/Information to patient and family:   See after visit summary for information provided to patient and family  Provisions for Follow-Up Care:  See after visit summary for information related to follow-up care and any pertinent home health orders  Disposition:     Home    Planned Readmission:      Discharge Statement:  I spent 45 minutes discharging the patient  This time was spent on the day of discharge  I had direct contact with the patient on the day of discharge  Greater than 50% of the total time was spent examining patient, answering all patient questions, arranging and discussing plan of care with patient as well as directly providing post-discharge instructions  Additional time then spent on discharge activities      Discharge Medications:  See after visit summary for reconciled discharge medications provided to patient and family        ** Please Note: This note has been constructed using a voice recognition system **

## 2021-07-12 NOTE — ASSESSMENT & PLAN NOTE
· Reports nausea, vomiting and diarrhea x 1 week  · CT of the abdomen and pelvis  IMPRESSION:  No acute inflammatory process identified within the abdomen or pelvis  Patient's symptoms are likely viral gastroenteritis  Her symptoms have resolved  Able to tolerate diet well  · Enteric pane pending  · C diff negative  · Clinically hemodynamically stable  · Zofran p r n

## 2021-07-13 LAB
BACTERIA UR CULT: ABNORMAL
BACTERIA UR CULT: ABNORMAL

## 2021-07-14 ENCOUNTER — PATIENT OUTREACH (OUTPATIENT)
Dept: CASE MANAGEMENT | Facility: OTHER | Age: 31
End: 2021-07-14

## 2021-07-14 NOTE — PROGRESS NOTES
Patient is a rising risk referral via Code [facility]  Attempted outreach  No answer & mailbox is not set up, unable to leave a message

## 2021-07-16 NOTE — PHYSICIAN ADVISOR
Current patient class: Inpatient  The patient is currently on Hospital Day: 3 at [address]      The patient was admitted to the hospital at 12:22 PM on 7/11/21 for the following diagnosis:  Vomiting [R11 10]  Nausea vomiting and diarrhea [R11 2, R19 7]  Diabetic ulcer of left great toe ([address] ) [S48 342, L97 529]       There was documentation in the medical record of an expected length of stay of at least 2 midnights  The patient was therefore expected to satisfy the 2 midnight benchmark and given the 2 midnight presumption was appropriate for INPATIENT ADMISSION  Given this expectation of a satisfying stay, CMS instructs us that the patient is most often appropriate for inpatient admission under part A provided medical necessity is documented in the chart  After review of the relevant documentation, labs, vital signs and test results, the patient is appropriate for INPATIENT ADMISSION  Admission to the hospital as an inpatient is a complex decision making process which requires the practitioner to consider the patients presenting complaint, history and physical examination and all relevant testing  With this in mind, in this case, the patient was deemed appropriate for INPATIENT ADMISSION  After review of the documentation and testing available at the time of the admission, I concur with this clinical determination of medical necessity  For the reason noted, the patient was discharged before reaching 2 midnights as an inpatient  Rationale is as follows: The patient is a 27 yrs old Female who presented to the ED at 7/10/2021 11:17 PM with a chief complaint of Vomiting (Patient reports nausea, vomiting and abdominal pain x 1 week)   Patient had stool studies done for which results were pending on day 2 of admission  Patient also was noted to have acute cystitis with need for IV antibiotics and also had a diabetic foot ulcer in need for podiatry evaluation    Given the above and the need for further monitoring in a patient came with nausea vomiting and diarrhea as well as needing antibiotics for urinary tract infection and podiatry evaluation, the patient did cross the 2 midnight benchmark as set by Medicare and is inpatient status appropriate  The patients vitals on arrival were   ED Triage Vitals   Temperature Pulse Respirations Blood Pressure SpO2   07/10/21 2211 07/10/21 2211 07/10/21 2211 07/10/21 2211 07/10/21 2211   98 2 °F (36 8 °C) 78 18 126/69 97 %      Temp Source Heart Rate Source Patient Position - Orthostatic VS BP Location FiO2 (%)   07/10/21 2211 07/10/21 2211 07/10/21 2211 07/10/21 2211 --   Oral Monitor Sitting Right arm       Pain Score       07/11/21 1533       No Pain           Past Medical History:   Diagnosis Date    Anxiety     Bipolar disorder ([facility] [address] )     Depression     Diabetes mellitus ([facility] [address] )     Epilepsy ([facility] )     Psychiatric disorder      Past Surgical History:   Procedure Laterality Date    OTHER SURGICAL HISTORY      Patient states she has surgery on "ear tubes", and bone spur in R hand, and 2nd toe on left foot    TOE AMPUTATION Right 3/16/2021    Procedure: AMPUTATION TOE;  Surgeon: Dewey Henning DPM;  Location: [facility];  Service: Podiatry    TONSILLECTOMY             Consults have been placed to:   None    Vitals:    07/11/21 2130 07/11/21 2221 07/11/21 2222 07/12/21 0701   BP: 108/64 (!) 86/50 (!) 88/50 105/70   BP Location:       Pulse: 72 87 89 78   Resp: 18 18  17   Temp: 97 6 °F (36 4 °C) 97 6 °F (36 4 °C) 97 6 °F (36 4 °C) 98 3 °F (36 8 °C)   TempSrc:       SpO2: 93% 92% 92% 94%   Weight:       Height:           Most recent labs:    No results for input(s): WBC, HGB, HCT, PLT, K, NA, CALCIUM, BUN, CREATININE, LIPASE, AMYLASE, INR, TROPONINI, CKTOTAL, AST, ALT, ALKPHOS, BILITOT in the last 72 hours  Scheduled Meds:  Continuous Infusions:No current facility-administered medications for this encounter  PRN Meds:      Surgical procedures (if appropriate):

## 2021-08-05 ENCOUNTER — APPOINTMENT (EMERGENCY)
Dept: RADIOLOGY | Facility: HOSPITAL | Age: 31
End: 2021-08-05
Payer: MEDICARE

## 2021-08-05 ENCOUNTER — HOSPITAL ENCOUNTER (EMERGENCY)
Facility: HOSPITAL | Age: 31
Discharge: HOME | End: 2021-08-05
Attending: EMERGENCY MEDICINE | Admitting: EMERGENCY MEDICINE
Payer: MEDICARE

## 2021-08-05 VITALS
HEART RATE: 70 BPM | WEIGHT: 253 LBS | BODY MASS INDEX: 43.19 KG/M2 | RESPIRATION RATE: 18 BRPM | TEMPERATURE: 97.1 F | HEIGHT: 64 IN | SYSTOLIC BLOOD PRESSURE: 121 MMHG | OXYGEN SATURATION: 98 % | DIASTOLIC BLOOD PRESSURE: 78 MMHG

## 2021-08-05 DIAGNOSIS — M62.838 NECK MUSCLE SPASM: ICD-10-CM

## 2021-08-05 DIAGNOSIS — R51.9 HEADACHE: ICD-10-CM

## 2021-08-05 DIAGNOSIS — Z51.89 VISIT FOR WOUND CHECK: Primary | ICD-10-CM

## 2021-08-05 LAB
BILIRUB UR QL STRIP: ABNORMAL
CLARITY UR: ABNORMAL
COLOR UR: ABNORMAL
GLUCOSE SERPL-MCNC: 129 MG/DL (ref 65–140)
GLUCOSE UR STRIP-MCNC: ABNORMAL MG/DL
HGB UR QL STRIP.AUTO: NEGATIVE
KETONES UR STRIP-MCNC: NEGATIVE MG/DL
LEUKOCYTE ESTERASE UR QL STRIP: NEGATIVE
NITRITE UR QL STRIP: NEGATIVE
PH UR STRIP.AUTO: 5.5 [PH]
PROT UR STRIP-MCNC: NEGATIVE MG/DL
SP GR UR STRIP.AUTO: >=1.03 (ref 1–1.03)
UROBILINOGEN UR QL STRIP.AUTO: 0.2 E.U./DL

## 2021-08-05 PROCEDURE — 99284 EMERGENCY DEPT VISIT MOD MDM: CPT

## 2021-08-05 PROCEDURE — 82948 REAGENT STRIP/BLOOD GLUCOSE: CPT

## 2021-08-05 PROCEDURE — 81003 URINALYSIS AUTO W/O SCOPE: CPT | Performed by: EMERGENCY MEDICINE

## 2021-08-05 PROCEDURE — 73630 X-RAY EXAM OF FOOT: CPT

## 2021-08-05 PROCEDURE — 99284 EMERGENCY DEPT VISIT MOD MDM: CPT | Performed by: EMERGENCY MEDICINE

## 2021-08-05 RX ORDER — METHOCARBAMOL 500 MG/1
500 TABLET, FILM COATED ORAL 2 TIMES DAILY
Qty: 20 TABLET | Refills: 0 | Status: SHIPPED | OUTPATIENT
Start: 2021-08-05

## 2021-08-05 RX ORDER — CEPHALEXIN 250 MG/1
500 CAPSULE ORAL ONCE
Status: COMPLETED | OUTPATIENT
Start: 2021-08-05 | End: 2021-08-05

## 2021-08-05 RX ORDER — METHOCARBAMOL 500 MG/1
500 TABLET, FILM COATED ORAL ONCE
Status: COMPLETED | OUTPATIENT
Start: 2021-08-05 | End: 2021-08-05

## 2021-08-05 RX ORDER — CEPHALEXIN 500 MG/1
500 CAPSULE ORAL EVERY 6 HOURS SCHEDULED
Qty: 40 CAPSULE | Refills: 0 | Status: SHIPPED | OUTPATIENT
Start: 2021-08-05 | End: 2021-08-15

## 2021-08-05 RX ADMIN — METHOCARBAMOL 500 MG: 500 TABLET ORAL at 20:21

## 2021-08-05 RX ADMIN — CEPHALEXIN 500 MG: 250 CAPSULE ORAL at 20:21

## 2021-08-05 NOTE — ED PROVIDER NOTES
History  Chief Complaint   Patient presents with    Headache     per pt, c/o back pain, neck pain and migraine     Wound Check     wound on bottom of big toe      27 y o  F presents brought in by ambulance for chronic problems  Patient is question multiple times why she presents the emergency department for chronic problems by ambulance in patient is unable to answer this question  She presents with a wound to the bottom of her big toe, states that this wound has been present since December  Does not seem worse than it has been in the past   However she wanted evaluated  Mild surrounding erythema, appears consistent with diabetic foot ulcer  Will put patient on antibiotics and advised her to see Podiatry  There is no crepitus, no concern for necrotizing fasciitis or gangrene  In addition patient has a headache, patient is a history of migraines and states that this feels consistent with prior migraines  She did not attempt for migraine medication prior to calling the ambulance  She denies acute onset of the headache, not maximal in onset  She has no signs of meningismus, normal range of motion of her neck  No fevers  No signs of infection  Likely her normal migraines  Prior to Admission Medications   Prescriptions Last Dose Informant Patient Reported? Taking?    Dulaglutide (Trulicity) 1 5 [place]/6 0IU SOPN  Self Yes No   Sig: Inject 1 5 mg under the skin once a week   Mirabegron ER 50 MG TB24   Yes No   Sig: Take 50 mg by mouth   atorvastatin (LIPITOR) 20 mg tablet   Yes No   Sig: Take 20 mg by mouth   gabapentin (NEURONTIN) 300 mg capsule  Self Yes No   Sig: Take 600 mg by mouth 3 (three) times a day    glipiZIDE (GLUCOTROL XL) 5 mg 24 hr tablet   Yes No   Sig: Take 5 mg by mouth daily   lamoTRIgine (LaMICtal) 100 mg tablet   Yes No   Sig: Take 100 mg by mouth   metFORMIN (GLUCOPHAGE) 1000 MG tablet   Yes No   Sig: Take 1,000 mg by mouth 2 (two) times a day with meals   saccharomyces boulardii (FLORASTOR) 250 mg capsule   No No   Sig: Take 1 capsule (250 mg total) by mouth 2 (two) times a day   sertraline (ZOLOFT) 100 mg tablet  Self Yes No   Sig: Take 100 mg by mouth daily   topiramate (TOPAMAX) 100 mg tablet   Yes No   Sig: Take 100 mg by mouth Takes 2 tabs at bed time      Facility-Administered Medications: None       Past Medical History:   Diagnosis Date    Anxiety     Bipolar disorder ([place] [address] )     Depression     Diabetes mellitus ([place] [address] )     Epilepsy ([place] [address] )     Psychiatric disorder        Past Surgical History:   Procedure Laterality Date    OTHER SURGICAL HISTORY      Patient states she has surgery on "ear tubes", and bone spur in R hand, and 2nd toe on left foot    TOE AMPUTATION Right 3/16/2021    Procedure: AMPUTATION TOE;  Surgeon: Randa Davis DPM;  Location: [facility];  Service: Podiatry    TONSILLECTOMY         Family History   Family history unknown: Yes     I have reviewed and agree with the history as documented  E-Cigarette/Vaping    E-Cigarette Use Never User      E-Cigarette/Vaping Substances    Nicotine No      Social History     Tobacco Use    Smoking status: Former Smoker     Packs/day: 0 50     Types: Cigarettes     Quit date: 2021     Years since quittin 1    Smokeless tobacco: Never Used    Tobacco comment: States quit a few days ago   Vaping Use    Vaping Use: Never used   Substance Use Topics    Alcohol use: Never    Drug use: Never       Review of Systems   Constitutional: Negative for chills, fatigue and fever  HENT: Negative for congestion and rhinorrhea  Respiratory: Negative for chest tightness and shortness of breath  Cardiovascular: Negative for chest pain and leg swelling  Gastrointestinal: Negative for abdominal pain, nausea and vomiting  Musculoskeletal: Positive for neck pain ( neck muscle spasm)  Negative for back pain and neck stiffness  Skin: Positive for wound (Left foot big toe diabetic ulcer)     Neurological: Positive for headaches  Negative for dizziness, weakness, light-headedness and numbness  All other systems reviewed and are negative  Physical Exam  Physical Exam  Vitals reviewed  Constitutional:       General: She is not in acute distress  Appearance: She is well-developed  She is not ill-appearing, toxic-appearing or diaphoretic  HENT:      Head: Normocephalic and atraumatic  Right Ear: Tympanic membrane normal       Left Ear: Tympanic membrane normal    Eyes:      Conjunctiva/sclera: Conjunctivae normal    Neck:      Comments: Bilateral paraspinal neck muscle spasm  No meningismus  Normal range of motion of the neck  No stiffness  Cardiovascular:      Rate and Rhythm: Normal rate and regular rhythm  Pulmonary:      Effort: Pulmonary effort is normal  No respiratory distress  Abdominal:      Tenderness: There is no abdominal tenderness  Musculoskeletal:         General: Normal range of motion  Cervical back: Normal range of motion  No rigidity  Lymphadenopathy:      Cervical: No cervical adenopathy  Skin:     General: Skin is warm and dry  Coloration: Skin is not pale  Comments: Wound to left great toe, diabetic foot ulcer  Mild surrounding erythema  No crepitus  No wound tracking  Discharge  Neurovascularly intact  Neurological:      General: No focal deficit present  Mental Status: She is alert and oriented to person, place, and time  Cranial Nerves: No cranial nerve deficit  Sensory: No sensory deficit  Motor: No weakness     Psychiatric:         Behavior: Behavior normal          Vital Signs  ED Triage Vitals [08/05/21 1727]   Temperature Pulse Respirations Blood Pressure SpO2   (!) 97 1 °F (36 2 °C) 75 17 114/56 98 %      Temp Source Heart Rate Source Patient Position - Orthostatic VS BP Location FiO2 (%)   Tympanic Monitor Sitting Left arm --      Pain Score       5           Vitals:    08/05/21 1727 08/05/21 2215   BP: 114/56 121/78 Pulse: 75 70   Patient Position - Orthostatic VS: Sitting Sitting         Visual Acuity  Visual Acuity      Most Recent Value   L Pupil Size (mm)  3   R Pupil Size (mm)  3          ED Medications  Medications   cephalexin (KEFLEX) capsule 500 mg (500 mg Oral Given 8/5/21 2021)   methocarbamol (ROBAXIN) tablet 500 mg (500 mg Oral Given 8/5/21 2021)       Diagnostic Studies  Results Reviewed     Procedure Component Value Units Date/Time    UA w Reflex to Microscopic w Reflex to Culture [[id number]]  (Abnormal) Collected: 08/05/21 2115    Lab Status: Final result Specimen: Urine, Clean Catch Updated: 08/05/21 2126     Color, UA Rowan     Clarity, UA Cloudy     Specific Gravity, UA >=1 030     pH, UA 5 5     Leukocytes, UA Negative     Nitrite, UA Negative     Protein, UA Negative mg/dl      Glucose,  (1/10%) mg/dl      Ketones, UA Negative mg/dl      Urobilinogen, UA 0 2 E U /dl      Bilirubin, UA Small     Blood, UA Negative    Fingerstick Glucose (POCT) [[id number]]  (Normal) Collected: 08/05/21 2021    Lab Status: Final result Updated: 08/05/21 2022     POC Glucose 129 mg/dl                  XR foot 3+ vw left   ED Interpretation by Ada Galindo DO (08/05 2019)   No soft tissue gas  Final Result by Alycia Whiting MD (13/26 0487)   No acute osseous abnormality  No evidence of deep space infection or osteomyelitis      Workstation performed: [id number]                    Procedures  Procedures         ED Course  ED Course as of Aug 24 1505   Thu Aug 05, 2021   2032 POC Glucose: 129   2116 Patient feeling improved after robaxin  Will send home on keflex for wound infection and advise follow up with wound care      2132 Glucose, UA(!): 100 (1/10%)                             SBIRT 20yo+      Most Recent Value   SBIRT (23 yo +)   In order to provide better care to our patients, we are screening all of our patients for alcohol and drug use   Would it be okay to ask you these screening questions? Yes Filed at: 08/05/2021 1936   Initial Alcohol Screen: US AUDIT-C    1  How often do you have a drink containing alcohol?  0 Filed at: 08/05/2021 1936   2  How many drinks containing alcohol do you have on a typical day you are drinking? 0 Filed at: 08/05/2021 1936   3b  FEMALE Any Age, or MALE 65+: How often do you have 4 or more drinks on one occassion? 0 Filed at: 08/05/2021 1936   Audit-C Score  0 Filed at: 08/05/2021 1936   HERACLIO: How many times in the past year have you    Used an illegal drug or used a prescription medication for non-medical reasons? Never Filed at: 08/05/2021 1936                    [facility]  Number of Diagnoses or Management Options  Headache  Neck muscle spasm  Visit for wound check  Diagnosis management comments: 25-year-old female presents with non emergent conditions  She has a wound to her left great toe, she is put on Keflex and advised follow-up with Podiatry  Complaining of her baseline migraines but did not try medication at home prior to arrival   She is given medication, feels improved after medications  Likely suffering from neck muscle spasm and is given Robaxin for neck muscle spasm control  Patient feels improved after this and will be sent home on the same  Discharged in stable condition         Amount and/or Complexity of Data Reviewed  Clinical lab tests: ordered and reviewed  Tests in the radiology section of CPT®: ordered and reviewed        Disposition  Final diagnoses:   Visit for wound check   Neck muscle spasm   Headache     Time reflects when diagnosis was documented in both MDM as applicable and the Disposition within this note     Time User Action Codes Description Comment    8/5/2021  9:32 PM Minal Shirley Add [Z51 89] Visit for wound check     8/5/2021  9:35 PM Valene Fern Add [F39 085] Neck muscle spasm     8/5/2021  9:35 PM Concepcion Salinas Add [R51 9] Headache       ED Disposition     ED Disposition Condition Date/Time Comment    Discharge Stable Thu Aug 5, 2021  9:32 PM Tiffanie Alexander discharge to home/self care              Follow-up Information     Follow up With Specialties Details Why Contact Info Additional Information    Justo Enriquez PA-C Physician Assistant Schedule an appointment as soon as possible for a visit  For continued care of chronic medical problems, and follow up to ensure improvement, and for further testing and treatment as needed [address] [facility] [id number]  [address] [facility] Schedule an appointment as soon as possible for a visit  for wound care of your diabetic foot ulcer [address] [facility] [address]  [address], [facility] [address], [facility], [address]      [facility] Physical Therapy  For follow-up for chronic back and neck pain [phone]          Discharge Medication List as of 8/5/2021 10:01 PM      START taking these medications    Details   cephalexin (KEFLEX) 500 mg capsule Take 1 capsule (500 mg total) by mouth every 6 (six) hours for 10 days, Starting u 8/5/2021, Until Sun 8/15/2021, Normal      methocarbamol (ROBAXIN) 500 mg tablet Take 1 tablet (500 mg total) by mouth 2 (two) times a day As needed for neck and back muscle spasm, Starting Thu 8/5/2021, Normal         CONTINUE these medications which have NOT CHANGED    Details   atorvastatin (LIPITOR) 20 mg tablet Take 20 mg by mouth, Historical Med      Dulaglutide (Trulicity) 1 5 EO/4 5FC SOPN Inject 1 5 mg under the skin once a week, Historical Med      gabapentin (NEURONTIN) 300 mg capsule Take 600 mg by mouth 3 (three) times a day , Historical Med      glipiZIDE (GLUCOTROL XL) 5 mg 24 hr tablet Take 5 mg by mouth daily, Historical Med      lamoTRIgine (LaMICtal) 100 mg tablet Take 100 mg by mouth, Historical Med metFORMIN (GLUCOPHAGE) 1000 MG tablet Take 1,000 mg by mouth 2 (two) times a day with meals, Historical Med      Mirabegron ER 50 MG TB24 Take 50 mg by mouth, Historical Med      saccharomyces boulardii (FLORASTOR) 250 mg capsule Take 1 capsule (250 mg total) by mouth 2 (two) times a day, Starting Mon 7/12/2021, Normal      sertraline (ZOLOFT) 100 mg tablet Take 100 mg by mouth daily, Historical Med      topiramate (TOPAMAX) 100 mg tablet Take 100 mg by mouth Takes 2 tabs at bed time, Historical Med           No discharge procedures on file      PDMP Review     None          ED Provider  Electronically Signed by           Jackson Carcamo DO  08/24/21 3441

## 2021-08-06 ENCOUNTER — PATIENT OUTREACH (OUTPATIENT)
Dept: CASE MANAGEMENT | Facility: OTHER | Age: 31
End: 2021-08-06

## 2021-08-06 NOTE — DISCHARGE INSTRUCTIONS
Please follow-up with outpatient providers  Follow up with the [address] [place] [address], follow-up with your primary care provider  Please take your antibiotics as provided in case your wound is starting to become infected    Please continue to take your home medication for migraines and back/neck pain

## 2021-09-10 ENCOUNTER — PATIENT OUTREACH (OUTPATIENT)
Dept: CASE MANAGEMENT | Facility: OTHER | Age: 31
End: 2021-09-10

## 2021-09-10 NOTE — PROGRESS NOTES
Third attempt to reach the patient  Provided a brief introduction to care management  Patient states she no longer lives in [facility]  She moved to [facility], but does not provide an address  Assured her this OP CM could still work with her  Patient states she would like to think about it  She has this OP CM's number & will call back

## 2021-09-17 ENCOUNTER — PATIENT OUTREACH (OUTPATIENT)
Dept: CASE MANAGEMENT | Facility: OTHER | Age: 31
End: 2021-09-17

## 2021-09-17 NOTE — PROGRESS NOTES
No response from the patient   OP CM removed from the care team  self-care/community/leisure/home management

## 2022-10-12 PROBLEM — N30.00 ACUTE CYSTITIS WITHOUT HEMATURIA: Status: RESOLVED | Noted: 2021-07-11 | Resolved: 2022-10-12

## 2022-10-12 PROBLEM — A41.9 SEPSIS (HCC): Status: RESOLVED | Noted: 2021-02-18 | Resolved: 2022-10-12

## 2022-10-13 NOTE — ASSESSMENT & PLAN NOTE
Continue home statin [Fever] : no fever [Nasal Discharge] : nasal discharge [Sore Throat] : sore throat [Postnasal Drip] : postnasal drip [Chest Pain] : no chest pain [Shortness Of Breath] : no shortness of breath

## 2023-08-30 NOTE — ASSESSMENT & PLAN NOTE
Continue home Topamax and Geodon Post-Care Instructions: I reviewed with the patient in detail post-care instructions. Patient is to keep the biopsy site dry overnight, and then apply bacitracin twice daily until healed. Patient may apply hydrogen peroxide soaks to remove any crusting.

## 2024-03-03 NOTE — ED NOTES
CW completed transportation paperwork  CW received call from Cassandra Morales  As per Sumit Rubi, pt will be transported via CTS at 1400 to [facility]  CW provided updates to [facility]  CW provided pt w/wireless phone to speak w/Al at [facility] to complete COVID-19 screening questions  Pt requested to speak w/friends via telephone  CW provided pt w/wireless phone and numbers to call      TDS, CW Emergency Medicine Note  HPI   HISTORY OF PRESENT ILLNESS      HPI: 63 y.o.female p/w Chest Pain and Shortness of Breath has a past medical history of De Quervain's tenosynovitis, Essential (primary) hypertension, Follicular carcinoma of thyroid (CMS/HCC), Gastro-esophageal reflux, Hand ulceration (CMS/HCC), Hyperlipidemia, unspecified, Interstitial lung disease (CMS/HCC), Leg ulcer (CMS/HCC), Lung nodule, Lupus (CMS/HCC), Osteomyelitis of hand (CMS/HCC), Osteoporosis, Pulmonary hypertension (CMS/HCC), Raynaud's disease, Reflux esophagitis, Scleroderma (CMS/HCC), and Screening mammogram for breast cancer (05/04/2021).     Patient with chest pain that began last night, mid-sternal toward left side of chest. Reports associated nausea, diffuse body pain, dizziness. Has had new ulcers on feet that have limited her ability to walk.  Reports that she has had worsening chest pain and shortness of breath lately, does not require oxygen at home at baseline.  Follows with Dr. Arvizu of pulmonary at [facility], was told that there can start new infusions for her pulmonary sarcoidosis but have not been set up yet.    No LMP recorded (lmp unknown). Patient is postmenopausal..    has a past surgical history that includes Hernia repair; Cardiac catheterization; and Colonoscopy.   is allergic to aspirin, ibuprofen, iodine, iodine and iodide containing products, penicillins, sulfa (sulfonamide antibiotics), clindamycin, hydrochlorothiazide, oxycodone, sulfamethoxazole-trimethoprim, and latex.   family history includes Breast cancer in her niece; Heart disease in her biological brother.   has a current medication list which includes the following prescription(s): amlodipine, amlodipine, biotin, cholecalciferol (vitamin d3), cyanocobalamin (vitamin b-12), docusate sodium, fluticasone-umeclidinium-vilanterol, levothyroxine, macitentan, meclizine, mupirocin, prednisone, sildenafil (pulm.hypertension), and tocilizumab.               Patient  History   PAST HISTORY     Reviewed from Nursing Triage:       Past Medical History:   Diagnosis Date   • De Quervain's tenosynovitis    • Essential (primary) hypertension    • Follicular carcinoma of thyroid (CMS/HCC)    • Gastro-esophageal reflux    • Hand ulceration (CMS/HCC)    • Hyperlipidemia, unspecified    • Interstitial lung disease (CMS/HCC)    • Leg ulcer (CMS/HCC)    • Lung nodule    • Lupus (CMS/HCC)    • Osteomyelitis of hand (CMS/HCC)    • Osteoporosis    • Pulmonary hypertension (CMS/HCC)    • Raynaud's disease     Severe   • Reflux esophagitis    • Scleroderma (CMS/HCC)    • Screening mammogram for breast cancer 2021    BI-RADS category: 1 - Negative (care everywhere @ [facility])       Past Surgical History:   Procedure Laterality Date   • CARDIAC CATHETERIZATION     • COLONOSCOPY     • HERNIA REPAIR         Family History   Problem Relation Age of Onset   • Heart disease Biological Brother         stent   • Breast cancer Niece    • Cervical cancer Neg Hx    • Uterine cancer Neg Hx    • Colon cancer Neg Hx    • Ovarian cancer Neg Hx        Social History     Tobacco Use   • Smoking status: Former     Types: Cigarettes     Quit date: 9/15/2005     Years since quittin.4   • Smokeless tobacco: Never   • Tobacco comments:     Would smoke 1/2 of 1/2 PPD, quit in    Vaping Use   • Vaping Use: Never used   Substance Use Topics   • Alcohol use: Never   • Drug use: Never         Review of Systems   REVIEW OF SYSTEMS     Review of Systems      VITALS     ED Vitals    Date/Time Temp Pulse Resp BP SpO2 [facility]   24 1614 -- -- -- 163/74 -- KAD   24 1506 36.6 °C (97.9 °F) 132 24 138/67 90 % MCT                       Physical Exam   PHYSICAL EXAM     Physical Exam  Vitals and nursing note reviewed.   Constitutional:       General: She is not in acute distress.     Appearance: She is ill-appearing. She is not toxic-appearing.   HENT:      Head: Normocephalic and atraumatic.   Eyes:       Extraocular Movements: Extraocular movements intact.      Pupils: Pupils are equal, round, and reactive to light.   Cardiovascular:      Rate and Rhythm: Tachycardia present.      Heart sounds: Normal heart sounds.   Pulmonary:      Effort: Tachypnea present.   Musculoskeletal:      Right lower leg: Tenderness present.   Skin:     Comments: Left foot, abscess to medial malleolus and hallux.  Right leg smaller abscess to lateral malleolus and gangrenous changes to second toe.           PROCEDURES     Procedures     DATA     Results     Procedure Component Value Units Date/Time    Ruby Draw Panel [[id number]] Collected: 03/03/24 1626    Specimen: Blood, Venous Updated: 03/03/24 1638    Narrative:      The following orders were created for panel order Ruby Draw Panel.  Procedure                               Abnormality         Status                     ---------                               -----------         ------                     RAINBOW LT BLUE[[id number]]                                  In process                   Please view results for these tests on the individual orders.    Tactilize LT BLUE [[id number]] Collected: 03/03/24 1626    Specimen: Blood, Venous Updated: 03/03/24 1638    Comprehensive metabolic panel [[id number]] Collected: 03/03/24 1625    Specimen: Blood, Venous Updated: 03/03/24 1637    HS Troponin I (with 2 hour reflex) [[id number]] Collected: 03/03/24 1625    Specimen: Blood, Venous Updated: 03/03/24 1637    Lactate, w/ reflex repeat if > 2.0 [[id number]] Collected: 03/03/24 1625    Specimen: Blood, Venous Updated: 03/03/24 1634    SARS-COV-2 (COVID-19)/ FLU A/B, AND RSV, PCR Nasopharynx [[id number]] Collected: 03/03/24 1515    Specimen: Nasopharyngeal Swab from Nasopharynx Updated: 03/03/24 1614          Imaging Results          X-RAY CHEST 2 VIEWS (In process)  Result time 03/03/24 16:14:03                ECG 12 lead    (Results Pending)       Scoring tools                                   ED Course & MDM   MDM / ED COURSE / CLINICAL IMPRESSION / DISPO     Medical Decision Making  63-year-old female presents with chest pain, shortness of breath, lower extremity pain from multiple ulcers on her feet.  On arrival, she is tachycardic, saturating in the high 80s on room air with no prior oxygen requirement.  Does have history of pulmonary embolism, not currently anticoagulated.  Placed on 2 L to saturate in the low 90s.  Blood work obtained concerning for elevated D-dimer.  Plan to obtain CT angiogram with IV dye allergy premedication.  Signed out pending result as well as plain film of feet to ensure that ulcerations do not show any evidence of acute infection.    Amount and/or Complexity of Data Reviewed  Labs: ordered. Decision-making details documented in ED Course.  Radiology: ordered.  ECG/medicine tests: ordered.      Risk  Prescription drug management.          ED Course as of 03/03/24 2216   Sun Mar 03, 2024   1642 Lactate: 1.1 [ES]   1702 Lactate: 1.1 [ES]   1728 D-Dimer, Quant(!): 0.68 [ES]   2030 I reviewed the CT but do not see a PE.  It does look like there is consolidation in the lower lobes but it was present on prior CT's as well. [HS]   2132 I spoke to cardiology about the moderate to large pericardial effusion on the CT and it looks like there is possibly electrical alternans on her EKG.  They will review her chart and call me back. [HS]   2216 Cardiology called back.  They spoke with Dr. Hoff.  They will admit the patient to the ICU. [HS]      ED Course User Index  [ES] Ramirez German MD  [HS] Naun Prasad (Ed) MD Ramirez     Clinical Impression      None               Ramirez German MD  03/04/24 9965

## 2024-07-02 ENCOUNTER — HOSPITAL ENCOUNTER (OUTPATIENT)
Dept: HOSPITAL 99 - RCS | Age: 34
End: 2024-07-02
Payer: COMMERCIAL

## 2024-07-02 DIAGNOSIS — R55: Primary | ICD-10-CM

## 2024-08-03 ENCOUNTER — OFFICE VISIT (OUTPATIENT)
Dept: URGENT CARE | Facility: CLINIC | Age: 34
End: 2024-08-03
Payer: MEDICARE

## 2024-08-03 VITALS
HEIGHT: 64 IN | TEMPERATURE: 98.2 F | SYSTOLIC BLOOD PRESSURE: 126 MMHG | OXYGEN SATURATION: 96 % | HEART RATE: 110 BPM | RESPIRATION RATE: 18 BRPM | DIASTOLIC BLOOD PRESSURE: 86 MMHG | WEIGHT: 248 LBS | BODY MASS INDEX: 42.34 KG/M2

## 2024-08-03 DIAGNOSIS — H60.501 ACUTE NON-INFECTIVE OTITIS EXTERNA OF RIGHT EAR, UNSPECIFIED TYPE: Primary | ICD-10-CM

## 2024-08-03 PROCEDURE — G0463 HOSPITAL OUTPT CLINIC VISIT: HCPCS | Performed by: PREVENTIVE MEDICINE

## 2024-08-03 PROCEDURE — 99203 OFFICE O/P NEW LOW 30 MIN: CPT | Performed by: PREVENTIVE MEDICINE

## 2024-08-03 RX ORDER — OLANZAPINE 5 MG/1
5 TABLET ORAL DAILY PRN
COMMUNITY
Start: 2024-05-20

## 2024-08-03 RX ORDER — OXCARBAZEPINE 600 MG/1
1200 TABLET, FILM COATED ORAL
COMMUNITY

## 2024-08-03 RX ORDER — INSULIN LISPRO 100 [IU]/ML
INJECTION, SOLUTION INTRAVENOUS; SUBCUTANEOUS
COMMUNITY
Start: 2024-05-02

## 2024-08-03 RX ORDER — INSULIN GLARGINE 100 [IU]/ML
75 INJECTION, SOLUTION SUBCUTANEOUS
COMMUNITY

## 2024-08-03 RX ORDER — INSULIN GLARGINE 100 [IU]/ML
INJECTION, SOLUTION SUBCUTANEOUS
COMMUNITY
Start: 2024-05-02

## 2024-08-03 RX ORDER — INSULIN LISPRO 100 [IU]/ML
INJECTION, SOLUTION INTRAVENOUS; SUBCUTANEOUS
COMMUNITY
Start: 2024-04-30

## 2024-08-03 NOTE — PROGRESS NOTES
[facility]        NAME: Sonia Andrews is a 33 y.o. female  : 1990    MRN: [id number]  DATE: August 3, 2024  TIME: 12:43 PM    Assessment and Plan   Acute non-infective otitis externa of right ear, unspecified type [H60.501]  1. Acute non-infective otitis externa of right ear, unspecified type  neomycin-polymyxin-hydrocortisone (CORTISPORIN) otic solution            Patient Instructions       Follow up with PCP in 3-5 days.  Proceed to  ER if symptoms worsen.    If tests have been performed at [facility], our office will contact you with results if changes need to be made to the care plan discussed with you at the visit.  [facility]    Chief Complaint     Chief Complaint   Patient presents with    Earache     B/L ear pain started this morning.          History of Present Illness       Bilateral ear pain right  greater than left.  No fever no head cold no head congestion    Earache         Review of Systems   Review of Systems   HENT:  Positive for ear pain.          Current Medications       Current Outpatient Medications:     gabapentin (NEURONTIN) 300 mg capsule, Take 600 mg by mouth 3 (three) times a day , Disp: , Rfl:     HumaLOG KwikPen 100 units/mL injection pen, inject 40 units subcutaneously three times a day with MEALS, Disp: , Rfl:     lamoTRIgine (LaMICtal) 100 mg tablet, Take 100 mg by mouth, Disp: , Rfl:     Lantus SoloStar 100 units/mL SOPN, INJECT 80 UNITS SUBCUTANEOUS DAILY, Disp: , Rfl:     Mirabegron ER 50 MG TB24, Take 50 mg by mouth, Disp: , Rfl:     neomycin-polymyxin-hydrocortisone (CORTISPORIN) otic solution, Administer 4 drops into both ears every 6 (six) hours, Disp: 10 mL, Rfl: 0    OLANZapine (ZyPREXA) 5 mg tablet, Take 5 mg by mouth daily as needed, Disp: , Rfl:     OXcarbazepine (TRILEPTAL) 600 mg tablet, Take 1,200 mg by mouth, Disp: , Rfl:     atorvastatin (LIPITOR) 20 mg tablet, Take 20 mg by mouth (Patient not taking:  Reported on 8/3/2024), Disp: , Rfl:     Dulaglutide (Trulicity) 1.5 MG/0.5ML SOPN, Inject 1.5 mg under the skin once a week (Patient not taking: Reported on 8/3/2024), Disp: , Rfl:     glipiZIDE (GLUCOTROL XL) 5 mg 24 hr tablet, Take 5 mg by mouth daily (Patient not taking: Reported on 8/3/2024), Disp: , Rfl:     HumaLOG 100 UNIT/ML injection, inject 200 units subcutaneously once daily VIA PUMP (Patient not taking: Reported on 8/3/2024), Disp: , Rfl:     insulin glargine (LANTUS) 100 units/mL subcutaneous injection, Inject 75 Units under the skin (Patient not taking: Reported on 8/3/2024), Disp: , Rfl:     metFORMIN (GLUCOPHAGE) 1000 MG tablet, Take 1,000 mg by mouth 2 (two) times a day with meals (Patient not taking: Reported on 8/3/2024), Disp: , Rfl:     methocarbamol (ROBAXIN) 500 mg tablet, Take 1 tablet (500 mg total) by mouth 2 (two) times a day As needed for neck and back muscle spasm (Patient not taking: Reported on 8/3/2024), Disp: 20 tablet, Rfl: 0    saccharomyces boulardii (FLORASTOR) 250 mg capsule, Take 1 capsule (250 mg total) by mouth 2 (two) times a day (Patient not taking: Reported on 8/3/2024), Disp: 10 capsule, Rfl: 0    sertraline (ZOLOFT) 100 mg tablet, Take 100 mg by mouth daily (Patient not taking: Reported on 8/3/2024), Disp: , Rfl:     topiramate (TOPAMAX) 100 mg tablet, Take 100 mg by mouth Takes 2 tabs at bed time (Patient not taking: Reported on 8/3/2024), Disp: , Rfl:     Current Allergies     Allergies as of 08/03/2024 - Reviewed 08/03/2024   Allergen Reaction Noted    Empagliflozin Rash 04/20/2023    Amoxicillin Itching 01/01/2021    Empagliflozin-metformin hcl er Other (See Comments) 11/15/2022    Macrobid [nitrofurantoin] Itching 01/01/2021    Vancomycin Rash 01/01/2021            The following portions of the patient's history were reviewed and updated as appropriate: allergies, current medications, past family history, past medical history, past social history, past surgical  "history and problem list.     Past Medical History:   Diagnosis Date    Anxiety     Bipolar disorder (HCC)     Depression     Diabetes mellitus (HCC)     Epilepsy (HCC)     Psychiatric disorder        Past Surgical History:   Procedure Laterality Date    OTHER SURGICAL HISTORY      Patient states she has surgery on \"ear tubes\", and bone spur in R hand, and 2nd toe on left foot    TOE AMPUTATION Right 3/16/2021    Procedure: AMPUTATION TOE;  Surgeon: Lisette Holm DPM;  Location: [facility];  Service: Podiatry    TONSILLECTOMY         Family History   Family history unknown: Yes         Medications have been verified.        Objective   /86   Pulse (!) 110   Temp 98.2 °F (36.8 °C)   Resp 18   Ht 5' 4\" (1.626 m)   Wt 112 kg (248 lb)   SpO2 96%   BMI 42.57 kg/m²   No LMP recorded.       Physical Exam     Physical Exam  HENT:      Right Ear: Tympanic membrane normal.      Left Ear: Tympanic membrane normal.      Ears:      Comments: Right external auditory canal mildly reddened                  "

## 2024-09-14 ENCOUNTER — OFFICE VISIT (OUTPATIENT)
Dept: URGENT CARE | Facility: CLINIC | Age: 34
End: 2024-09-14
Payer: MEDICARE

## 2024-09-14 VITALS
OXYGEN SATURATION: 94 % | RESPIRATION RATE: 18 BRPM | HEART RATE: 98 BPM | SYSTOLIC BLOOD PRESSURE: 129 MMHG | BODY MASS INDEX: 43.02 KG/M2 | WEIGHT: 252 LBS | HEIGHT: 64 IN | TEMPERATURE: 98 F | DIASTOLIC BLOOD PRESSURE: 71 MMHG

## 2024-09-14 DIAGNOSIS — S61.312A LACERATION OF RIGHT MIDDLE FINGER WITHOUT FOREIGN BODY WITH DAMAGE TO NAIL, INITIAL ENCOUNTER: Primary | ICD-10-CM

## 2024-09-14 PROCEDURE — 99213 OFFICE O/P EST LOW 20 MIN: CPT | Performed by: PHYSICIAN ASSISTANT

## 2024-09-14 PROCEDURE — G0463 HOSPITAL OUTPT CLINIC VISIT: HCPCS | Performed by: PHYSICIAN ASSISTANT

## 2024-09-14 PROCEDURE — 12001 RPR S/N/AX/GEN/TRNK 2.5CM/<: CPT | Performed by: PHYSICIAN ASSISTANT

## 2024-09-14 NOTE — PROGRESS NOTES
[facility]        NAME: Sonia Andrews is a 33 y.o. female  : 1990    MRN: [id number]  DATE: 2024  TIME: 4:03 PM    Assessment and Plan   Laceration of right middle finger without foreign body with damage to nail, initial encounter [S61.312A]  1. Laceration of right middle finger without foreign body with damage to nail, initial encounter              Patient Instructions   Steri-Strips will fall off on their own.  Gently clean area with gentle soap and water.  Apply pressure bleeding controls.  Look for signs of infection as discussed.    Follow up with PCP in 3-5 days.  Proceed to  ER if symptoms worsen.    If tests have been performed at [facility], our office will contact you with results if changes need to be made to the care plan discussed with you at the visit.  You can review your full results on [facility] MyChart.    Chief Complaint     Chief Complaint   Patient presents with    Laceration     Cut right hand middle finger on  at about 3:30 pm          History of Present Illness       Patient a 33-year-old female significant past medical history of diabetes presents the office after stating laceration to right middle finger 30 minutes ago from a .  Last tetanus 2 months ago.        Review of Systems   Review of Systems   Skin:  Positive for wound.         Current Medications       Current Outpatient Medications:     gabapentin (NEURONTIN) 300 mg capsule, Take 600 mg by mouth 3 (three) times a day , Disp: , Rfl:     HumaLOG KwikPen 100 units/mL injection pen, inject 40 units subcutaneously three times a day with MEALS, Disp: , Rfl:     lamoTRIgine (LaMICtal) 100 mg tablet, Take 100 mg by mouth, Disp: , Rfl:     Lantus SoloStar 100 units/mL SOPN, INJECT 80 UNITS SUBCUTANEOUS DAILY, Disp: , Rfl:     Mirabegron ER 50 MG TB24, Take 50 mg by mouth, Disp: , Rfl:     neomycin-polymyxin-hydrocortisone (CORTISPORIN) otic solution, Administer 4 drops into both  ears every 6 (six) hours, Disp: 10 mL, Rfl: 0    OLANZapine (ZyPREXA) 5 mg tablet, Take 5 mg by mouth daily as needed, Disp: , Rfl:     OXcarbazepine (TRILEPTAL) 600 mg tablet, Take 1,200 mg by mouth, Disp: , Rfl:     atorvastatin (LIPITOR) 20 mg tablet, Take 20 mg by mouth (Patient not taking: Reported on 8/3/2024), Disp: , Rfl:     Dulaglutide (Trulicity) 1.5 MG/0.5ML SOPN, Inject 1.5 mg under the skin once a week (Patient not taking: Reported on 8/3/2024), Disp: , Rfl:     glipiZIDE (GLUCOTROL XL) 5 mg 24 hr tablet, Take 5 mg by mouth daily (Patient not taking: Reported on 8/3/2024), Disp: , Rfl:     HumaLOG 100 UNIT/ML injection, inject 200 units subcutaneously once daily VIA PUMP (Patient not taking: Reported on 8/3/2024), Disp: , Rfl:     insulin glargine (LANTUS) 100 units/mL subcutaneous injection, Inject 75 Units under the skin (Patient not taking: Reported on 8/3/2024), Disp: , Rfl:     metFORMIN (GLUCOPHAGE) 1000 MG tablet, Take 1,000 mg by mouth 2 (two) times a day with meals (Patient not taking: Reported on 8/3/2024), Disp: , Rfl:     methocarbamol (ROBAXIN) 500 mg tablet, Take 1 tablet (500 mg total) by mouth 2 (two) times a day As needed for neck and back muscle spasm (Patient not taking: Reported on 8/3/2024), Disp: 20 tablet, Rfl: 0    saccharomyces boulardii (FLORASTOR) 250 mg capsule, Take 1 capsule (250 mg total) by mouth 2 (two) times a day (Patient not taking: Reported on 8/3/2024), Disp: 10 capsule, Rfl: 0    sertraline (ZOLOFT) 100 mg tablet, Take 100 mg by mouth daily (Patient not taking: Reported on 8/3/2024), Disp: , Rfl:     topiramate (TOPAMAX) 100 mg tablet, Take 100 mg by mouth Takes 2 tabs at bed time (Patient not taking: Reported on 8/3/2024), Disp: , Rfl:     Current Allergies     Allergies as of 09/14/2024 - Reviewed 09/14/2024   Allergen Reaction Noted    Empagliflozin Rash 04/20/2023    Amoxicillin Itching 01/01/2021    Empagliflozin-metformin hcl er Other (See Comments)  "11/15/2022    Macrobid [nitrofurantoin] Itching 01/01/2021    Vancomycin Rash 01/01/2021            The following portions of the patient's history were reviewed and updated as appropriate: allergies, current medications, past family history, past medical history, past social history, past surgical history and problem list.     Past Medical History:   Diagnosis Date    Anxiety     Bipolar disorder (HCC)     Depression     Diabetes mellitus (HCC)     Epilepsy (HCC)     Psychiatric disorder        Past Surgical History:   Procedure Laterality Date    OTHER SURGICAL HISTORY      Patient states she has surgery on \"ear tubes\", and bone spur in R hand, and 2nd toe on left foot    TOE AMPUTATION Right 3/16/2021    Procedure: AMPUTATION TOE;  Surgeon: Lisette Holm DPM;  Location: [facility];  Service: Podiatry    TONSILLECTOMY         Family History   Family history unknown: Yes         Medications have been verified.        Objective   /71   Pulse 98   Temp 98 °F (36.7 °C)   Resp 18   Ht 5' 4\" (1.626 m)   Wt 114 kg (252 lb)   SpO2 94%   BMI 43.26 kg/m²   No LMP recorded.       Physical Exam     Physical Exam  Vitals and nursing note reviewed.   Constitutional:       Appearance: She is well-developed.   HENT:      Head: Normocephalic and atraumatic.      Right Ear: External ear normal.      Left Ear: External ear normal.      Nose: Nose normal.   Eyes:      General: Lids are normal.      Conjunctiva/sclera: Conjunctivae normal.   Skin:     General: Skin is warm and dry.      Capillary Refill: Capillary refill takes less than 2 seconds.      Findings: Wound (Very superficial 1 cm linear laceration to the ventral aspect of right middle finger.  No active bleeding.) present.   Neurological:      Mental Status: She is alert.       Wound cleansed with normal saline.  Minimal bleeding present.  Steri-Strips and Band-Aid applied.          "

## (undated) DEVICE — SUT VICRYL 3-0 PS-2 27 IN J427H

## (undated) DEVICE — LIGHT HANDLE COVER SLEEVE DISP BLUE STELLAR

## (undated) DEVICE — INTENDED FOR TISSUE SEPARATION, AND OTHER PROCEDURES THAT REQUIRE A SHARP SURGICAL BLADE TO PUNCTURE OR CUT.: Brand: BARD-PARKER ® CARBON RIB-BACK BLADES

## (undated) DEVICE — DRAPE SHEET THREE QUARTER

## (undated) DEVICE — GLOVE INDICATOR PI UNDERGLOVE SZ 7.5 BLUE

## (undated) DEVICE — PAD GROUNDING ADULT

## (undated) DEVICE — MEDI-VAC YANKAUER SUCTION HANDLE W/BULBOUS AND CONTROL VENT: Brand: CARDINAL HEALTH

## (undated) DEVICE — SUT ETHILON 3-0 PS-1 18 IN 1663G

## (undated) DEVICE — WET SKIN PREP TRAY: Brand: MEDLINE INDUSTRIES, INC.

## (undated) DEVICE — TUBING SUCTION 5MM X 12 FT

## (undated) DEVICE — PREP PAD BNS: Brand: CONVERTORS

## (undated) DEVICE — STOCKINETTE REGULAR

## (undated) DEVICE — BETHLEHEM UNIVERSAL  MIONR EXT: Brand: CARDINAL HEALTH